# Patient Record
Sex: FEMALE | Race: WHITE | NOT HISPANIC OR LATINO | Employment: OTHER | URBAN - METROPOLITAN AREA
[De-identification: names, ages, dates, MRNs, and addresses within clinical notes are randomized per-mention and may not be internally consistent; named-entity substitution may affect disease eponyms.]

---

## 2017-06-19 ENCOUNTER — HOSPITAL ENCOUNTER (OUTPATIENT)
Dept: FAMILY MEDICINE CLINIC | Facility: CLINIC | Age: 72
Setting detail: SPECIMEN
Discharge: HOME | End: 2017-06-19
Attending: HOSPITALIST | Admitting: HOSPITALIST

## 2017-06-19 LAB
INR PPP: 2.8 (ref 2–3)
PROTHROMBIN TIME: 31.1 SEC (ref 19.4–28.5)

## 2017-08-18 ENCOUNTER — HOSPITAL ENCOUNTER (OUTPATIENT)
Dept: FAMILY MEDICINE CLINIC | Facility: CLINIC | Age: 72
Setting detail: SPECIMEN
Discharge: HOME | End: 2017-08-18
Attending: HOSPITALIST | Admitting: HOSPITALIST

## 2017-08-18 LAB
INR PPP: 4.2 (ref 2–3)
PROTHROMBIN TIME: 44.6 SEC (ref 19.4–28.5)

## 2017-09-19 ENCOUNTER — HOSPITAL ENCOUNTER (OUTPATIENT)
Dept: FAMILY MEDICINE CLINIC | Facility: CLINIC | Age: 72
Setting detail: SPECIMEN
Discharge: HOME | End: 2017-09-19
Attending: HOSPITALIST | Admitting: HOSPITALIST

## 2017-09-19 LAB
ALBUMIN SERPL-MCNC: 3.9 G/DL (ref 3.5–4.8)
ALBUMIN/GLOB SERPL: 1.1 {RATIO} (ref 1–1.7)
ALP SERPL-CCNC: 69 IU/L (ref 32–91)
ALT SERPL-CCNC: 15 IU/L (ref 14–54)
ANION GAP SERPL CALC-SCNC: 12 MMOL/L (ref 10–20)
AST SERPL-CCNC: 25 IU/L (ref 15–41)
BASOPHILS # BLD AUTO: 0.1 10*3/UL (ref 0–0.2)
BASOPHILS NFR BLD AUTO: 1 % (ref 0–2)
BILIRUB SERPL-MCNC: 0.6 MG/DL (ref 0.3–1.2)
BUN SERPL-MCNC: 16 MG/DL (ref 8–20)
BUN/CREAT SERPL: 14.5 (ref 5.4–26.2)
CALCIUM SERPL-MCNC: 9.5 MG/DL (ref 8.9–10.3)
CHLORIDE SERPL-SCNC: 107 MMOL/L (ref 101–111)
CHOLEST SERPL-MCNC: 220 MG/DL
CHOLEST/HDLC SERPL: 3.7 {RATIO}
CONV CO2: 23 MMOL/L (ref 22–32)
CONV LDL CHOLESTEROL DIRECT: 147 MG/DL (ref 0–100)
CONV TOTAL PROTEIN: 7.6 G/DL (ref 6.1–7.9)
CREAT UR-MCNC: 1.1 MG/DL (ref 0.4–1)
DIFFERENTIAL METHOD BLD: (no result)
EOSINOPHIL # BLD AUTO: 0.2 10*3/UL (ref 0–0.3)
EOSINOPHIL # BLD AUTO: 3 % (ref 0–3)
ERYTHROCYTE [DISTWIDTH] IN BLOOD BY AUTOMATED COUNT: 13.5 % (ref 11.5–14.5)
FOLATE SERPL-MCNC: 12 NG/ML (ref 5.9–24.8)
GLOBULIN UR ELPH-MCNC: 3.7 G/DL (ref 2.5–3.8)
GLUCOSE SERPL-MCNC: 94 MG/DL (ref 65–99)
HCT VFR BLD AUTO: 45.5 % (ref 35–49)
HDLC SERPL-MCNC: 60 MG/DL
HGB BLD-MCNC: 15.3 G/DL (ref 12–15)
INR PPP: 2.1 (ref 2–3)
LDLC/HDLC SERPL: 2.5 {RATIO}
LIPID INTERPRETATION: ABNORMAL
LYMPHOCYTES # BLD AUTO: 1.8 10*3/UL (ref 0.8–4.8)
LYMPHOCYTES NFR BLD AUTO: 29 % (ref 18–42)
MCH RBC QN AUTO: 31.4 PG (ref 26–32)
MCHC RBC AUTO-ENTMCNC: 33.6 G/DL (ref 32–36)
MCV RBC AUTO: 93.4 FL (ref 80–94)
MONOCYTES # BLD AUTO: 0.7 10*3/UL (ref 0.1–1.3)
MONOCYTES NFR BLD AUTO: 10 % (ref 2–11)
NEUTROPHILS # BLD AUTO: 3.7 10*3/UL (ref 2.3–8.6)
NEUTROPHILS NFR BLD AUTO: 57 % (ref 50–75)
NRBC BLD AUTO-RTO: 0 /100{WBCS}
NRBC/RBC NFR BLD MANUAL: 0 10*3/UL
PLATELET # BLD AUTO: 175 10*3/UL (ref 150–450)
PMV BLD AUTO: 11 FL (ref 7.4–10.4)
POTASSIUM SERPL-SCNC: 4 MMOL/L (ref 3.6–5.1)
PROTHROMBIN TIME: 21.9 SEC (ref 19.4–28.5)
RBC # BLD AUTO: 4.87 10*6/UL (ref 4–5.4)
SODIUM SERPL-SCNC: 138 MMOL/L (ref 136–144)
TRIGL SERPL-MCNC: 92 MG/DL
TSH SERPL-ACNC: 1.66 UIU/ML (ref 0.34–5.6)
VIT B12 SERPL-MCNC: 172 PG/ML (ref 180–914)
VLDLC SERPL CALC-MCNC: 13 MG/DL
WBC # BLD AUTO: 6.4 10*3/UL (ref 4.5–11.5)

## 2017-10-19 ENCOUNTER — HOSPITAL ENCOUNTER (OUTPATIENT)
Dept: FAMILY MEDICINE CLINIC | Facility: CLINIC | Age: 72
Setting detail: SPECIMEN
Discharge: HOME | End: 2017-10-19
Attending: HOSPITALIST | Admitting: HOSPITALIST

## 2017-10-19 LAB
INR PPP: 2.7 (ref 2–3)
PROTHROMBIN TIME: 27 SEC (ref 19.4–28.5)

## 2017-11-02 ENCOUNTER — CONVERSION ENCOUNTER (OUTPATIENT)
Dept: FAMILY MEDICINE CLINIC | Facility: CLINIC | Age: 72
End: 2017-11-02

## 2017-11-20 ENCOUNTER — HOSPITAL ENCOUNTER (OUTPATIENT)
Dept: FAMILY MEDICINE CLINIC | Facility: CLINIC | Age: 72
Setting detail: SPECIMEN
Discharge: HOME | End: 2017-11-20
Attending: HOSPITALIST | Admitting: HOSPITALIST

## 2017-11-20 LAB
INR PPP: 3.5 (ref 2–3)
PROTHROMBIN TIME: 35.7 SEC (ref 19.4–28.5)

## 2018-01-17 ENCOUNTER — HOSPITAL ENCOUNTER (OUTPATIENT)
Dept: FAMILY MEDICINE CLINIC | Facility: CLINIC | Age: 73
Setting detail: SPECIMEN
Discharge: HOME | End: 2018-01-17
Attending: HOSPITALIST | Admitting: HOSPITALIST

## 2018-01-17 LAB
INR PPP: 4.3 (ref 2–3)
PROTHROMBIN TIME: 44.2 SEC (ref 19.4–28.5)

## 2018-02-15 ENCOUNTER — HOSPITAL ENCOUNTER (OUTPATIENT)
Dept: FAMILY MEDICINE CLINIC | Facility: CLINIC | Age: 73
Setting detail: SPECIMEN
Discharge: HOME | End: 2018-02-15
Attending: HOSPITALIST | Admitting: HOSPITALIST

## 2018-02-15 LAB
INR PPP: 2.3 (ref 2–3)
PROTHROMBIN TIME: 23.7 SEC (ref 19.4–28.5)

## 2018-03-16 ENCOUNTER — HOSPITAL ENCOUNTER (OUTPATIENT)
Dept: FAMILY MEDICINE CLINIC | Facility: CLINIC | Age: 73
Setting detail: SPECIMEN
Discharge: HOME | End: 2018-03-16
Attending: HOSPITALIST | Admitting: HOSPITALIST

## 2018-03-16 LAB
INR PPP: 2.2 (ref 2–3)
PROTHROMBIN TIME: 22.6 SEC (ref 19.4–28.5)

## 2018-04-16 ENCOUNTER — HOSPITAL ENCOUNTER (OUTPATIENT)
Dept: FAMILY MEDICINE CLINIC | Facility: CLINIC | Age: 73
Setting detail: SPECIMEN
Discharge: HOME | End: 2018-04-16
Attending: HOSPITALIST | Admitting: HOSPITALIST

## 2018-04-16 LAB
INR PPP: 2.9 (ref 2–3)
PROTHROMBIN TIME: 29.2 SEC (ref 19.4–28.5)

## 2018-05-17 ENCOUNTER — HOSPITAL ENCOUNTER (OUTPATIENT)
Dept: FAMILY MEDICINE CLINIC | Facility: CLINIC | Age: 73
Setting detail: SPECIMEN
Discharge: HOME | End: 2018-05-17
Attending: HOSPITALIST | Admitting: HOSPITALIST

## 2018-05-17 LAB
INR PPP: 1.5 (ref 2–3)
PROTHROMBIN TIME: 15.5 SEC (ref 19.4–28.5)

## 2018-06-15 ENCOUNTER — HOSPITAL ENCOUNTER (OUTPATIENT)
Dept: FAMILY MEDICINE CLINIC | Facility: CLINIC | Age: 73
Setting detail: SPECIMEN
Discharge: HOME | End: 2018-06-15
Attending: HOSPITALIST | Admitting: HOSPITALIST

## 2018-06-15 LAB
INR PPP: 3.1 (ref 2–3)
PROTHROMBIN TIME: 31.2 SEC (ref 19.4–28.5)

## 2018-07-16 ENCOUNTER — HOSPITAL ENCOUNTER (OUTPATIENT)
Dept: FAMILY MEDICINE CLINIC | Facility: CLINIC | Age: 73
Setting detail: SPECIMEN
Discharge: HOME | End: 2018-07-16
Attending: HOSPITALIST | Admitting: HOSPITALIST

## 2018-07-16 LAB
INR PPP: 2.7 (ref 2–3)
PROTHROMBIN TIME: 27.8 SEC (ref 19.4–28.5)

## 2018-08-20 ENCOUNTER — HOSPITAL ENCOUNTER (OUTPATIENT)
Dept: FAMILY MEDICINE CLINIC | Facility: CLINIC | Age: 73
Setting detail: SPECIMEN
Discharge: HOME | End: 2018-08-20
Attending: HOSPITALIST | Admitting: HOSPITALIST

## 2018-08-20 LAB
INR PPP: 2.5 (ref 2–3)
PROTHROMBIN TIME: 25.6 SEC (ref 19.4–28.5)

## 2018-09-25 ENCOUNTER — HOSPITAL ENCOUNTER (OUTPATIENT)
Dept: FAMILY MEDICINE CLINIC | Facility: CLINIC | Age: 73
Setting detail: SPECIMEN
Discharge: HOME | End: 2018-09-25
Attending: HOSPITALIST | Admitting: HOSPITALIST

## 2018-09-25 LAB
ALBUMIN SERPL-MCNC: 3.8 G/DL (ref 3.5–4.8)
ALBUMIN/GLOB SERPL: 1.1 {RATIO} (ref 1–1.7)
ALP SERPL-CCNC: 73 IU/L (ref 32–91)
ALT SERPL-CCNC: 18 IU/L (ref 14–54)
ANION GAP SERPL CALC-SCNC: 13.2 MMOL/L (ref 10–20)
AST SERPL-CCNC: 31 IU/L (ref 15–41)
BASOPHILS # BLD AUTO: 0.1 10*3/UL (ref 0–0.2)
BASOPHILS NFR BLD AUTO: 1 % (ref 0–2)
BILIRUB SERPL-MCNC: 0.4 MG/DL (ref 0.3–1.2)
BUN SERPL-MCNC: 18 MG/DL (ref 8–20)
BUN/CREAT SERPL: 15 (ref 5.4–26.2)
CALCIUM SERPL-MCNC: 9.7 MG/DL (ref 8.9–10.3)
CHLORIDE SERPL-SCNC: 107 MMOL/L (ref 101–111)
CONV CO2: 24 MMOL/L (ref 22–32)
CONV TOTAL PROTEIN: 7.3 G/DL (ref 6.1–7.9)
CREAT UR-MCNC: 1.2 MG/DL (ref 0.4–1)
DIFFERENTIAL METHOD BLD: (no result)
EOSINOPHIL # BLD AUTO: 0.2 10*3/UL (ref 0–0.3)
EOSINOPHIL # BLD AUTO: 3 % (ref 0–3)
ERYTHROCYTE [DISTWIDTH] IN BLOOD BY AUTOMATED COUNT: 13.3 % (ref 11.5–14.5)
GLOBULIN UR ELPH-MCNC: 3.5 G/DL (ref 2.5–3.8)
GLUCOSE SERPL-MCNC: 102 MG/DL (ref 65–99)
HCT VFR BLD AUTO: 43.4 % (ref 35–49)
HGB BLD-MCNC: 15 G/DL (ref 12–15)
INR PPP: 2.7 (ref 2–3)
LYMPHOCYTES # BLD AUTO: 2.1 10*3/UL (ref 0.8–4.8)
LYMPHOCYTES NFR BLD AUTO: 34 % (ref 18–42)
MCH RBC QN AUTO: 32.3 PG (ref 26–32)
MCHC RBC AUTO-ENTMCNC: 34.6 G/DL (ref 32–36)
MCV RBC AUTO: 93.4 FL (ref 80–94)
MONOCYTES # BLD AUTO: 0.6 10*3/UL (ref 0.1–1.3)
MONOCYTES NFR BLD AUTO: 10 % (ref 2–11)
NEUTROPHILS # BLD AUTO: 3.2 10*3/UL (ref 2.3–8.6)
NEUTROPHILS NFR BLD AUTO: 52 % (ref 50–75)
NRBC BLD AUTO-RTO: 0 /100{WBCS}
NRBC/RBC NFR BLD MANUAL: 0 10*3/UL
PLATELET # BLD AUTO: 203 10*3/UL (ref 150–450)
PMV BLD AUTO: 10.4 FL (ref 7.4–10.4)
POTASSIUM SERPL-SCNC: 4.2 MMOL/L (ref 3.6–5.1)
PROTHROMBIN TIME: 28 SEC (ref 19.4–28.5)
RBC # BLD AUTO: 4.65 10*6/UL (ref 4–5.4)
SODIUM SERPL-SCNC: 140 MMOL/L (ref 136–144)
WBC # BLD AUTO: 6.2 10*3/UL (ref 4.5–11.5)

## 2018-10-25 ENCOUNTER — HOSPITAL ENCOUNTER (OUTPATIENT)
Dept: FAMILY MEDICINE CLINIC | Facility: CLINIC | Age: 73
Setting detail: SPECIMEN
Discharge: HOME | End: 2018-10-25
Attending: HOSPITALIST | Admitting: HOSPITALIST

## 2018-10-25 LAB
INR PPP: 2.2 (ref 2–3)
PROTHROMBIN TIME: 21.6 SEC (ref 19.4–28.5)

## 2018-11-26 ENCOUNTER — HOSPITAL ENCOUNTER (OUTPATIENT)
Dept: FAMILY MEDICINE CLINIC | Facility: CLINIC | Age: 73
Setting detail: SPECIMEN
Discharge: HOME | End: 2018-11-26
Attending: HOSPITALIST | Admitting: HOSPITALIST

## 2018-11-26 LAB
INR PPP: 2.4 (ref 2–3)
PROTHROMBIN TIME: 23.1 SEC (ref 19.4–28.5)

## 2018-12-27 ENCOUNTER — HOSPITAL ENCOUNTER (OUTPATIENT)
Dept: FAMILY MEDICINE CLINIC | Facility: CLINIC | Age: 73
Setting detail: SPECIMEN
Discharge: HOME | End: 2018-12-27
Attending: HOSPITALIST | Admitting: HOSPITALIST

## 2018-12-27 LAB
INR PPP: 2.7 (ref 2–3)
PROTHROMBIN TIME: 26 SEC (ref 19.4–28.5)

## 2019-01-28 ENCOUNTER — HOSPITAL ENCOUNTER (OUTPATIENT)
Dept: FAMILY MEDICINE CLINIC | Facility: CLINIC | Age: 74
Setting detail: SPECIMEN
Discharge: HOME | End: 2019-01-28
Attending: HOSPITALIST | Admitting: HOSPITALIST

## 2019-01-28 LAB
INR PPP: 3.4 (ref 2–3)
PROTHROMBIN TIME: 32.8 SEC (ref 19.4–28.5)

## 2019-02-27 ENCOUNTER — HOSPITAL ENCOUNTER (OUTPATIENT)
Dept: FAMILY MEDICINE CLINIC | Facility: CLINIC | Age: 74
Setting detail: SPECIMEN
Discharge: HOME | End: 2019-02-27
Attending: HOSPITALIST | Admitting: HOSPITALIST

## 2019-02-27 LAB
INR PPP: 3.1 (ref 2–3)
PROTHROMBIN TIME: 30.2 SEC (ref 19.4–28.5)

## 2019-04-03 ENCOUNTER — HOSPITAL ENCOUNTER (OUTPATIENT)
Dept: FAMILY MEDICINE CLINIC | Facility: CLINIC | Age: 74
Setting detail: SPECIMEN
Discharge: HOME | End: 2019-04-03
Attending: HOSPITALIST | Admitting: HOSPITALIST

## 2019-04-03 LAB
INR PPP: 2.8 (ref 2–3)
PROTHROMBIN TIME: 26.8 SEC (ref 19.4–28.5)

## 2019-05-03 ENCOUNTER — HOSPITAL ENCOUNTER (OUTPATIENT)
Dept: FAMILY MEDICINE CLINIC | Facility: CLINIC | Age: 74
Setting detail: SPECIMEN
Discharge: HOME | End: 2019-05-03
Attending: HOSPITALIST | Admitting: HOSPITALIST

## 2019-06-04 VITALS — BODY MASS INDEX: 47.68 KG/M2 | HEIGHT: 67 IN

## 2019-06-10 ENCOUNTER — HOSPITAL ENCOUNTER (OUTPATIENT)
Dept: FAMILY MEDICINE CLINIC | Facility: CLINIC | Age: 74
Setting detail: SPECIMEN
Discharge: HOME | End: 2019-06-10
Attending: HOSPITALIST | Admitting: HOSPITALIST

## 2019-06-10 LAB
INR PPP: 4 (ref 2–3)
PROTHROMBIN TIME: 39.1 SEC (ref 19.4–28.5)

## 2019-06-27 DIAGNOSIS — Z79.01 ENCOUNTER FOR CURRENT LONG-TERM USE OF ANTICOAGULANTS: Primary | ICD-10-CM

## 2019-06-28 ENCOUNTER — LAB (OUTPATIENT)
Dept: LAB | Facility: HOSPITAL | Age: 74
End: 2019-06-28

## 2019-06-28 DIAGNOSIS — Z79.01 ENCOUNTER FOR CURRENT LONG-TERM USE OF ANTICOAGULANTS: ICD-10-CM

## 2019-06-28 LAB
INR PPP: 0.92 (ref 0.9–1.1)
PROTHROMBIN TIME: 9.7 SECONDS (ref 9.6–11.7)

## 2019-06-28 PROCEDURE — 85610 PROTHROMBIN TIME: CPT

## 2019-06-28 RX ORDER — WARFARIN SODIUM 6 MG/1
TABLET ORAL
Qty: 90 TABLET | Refills: 0 | Status: SHIPPED | OUTPATIENT
Start: 2019-06-28 | End: 2019-09-10 | Stop reason: SDUPTHER

## 2019-07-11 DIAGNOSIS — Z51.81 ENCOUNTER FOR MONITORING COUMADIN THERAPY: Primary | ICD-10-CM

## 2019-07-11 DIAGNOSIS — Z79.01 ENCOUNTER FOR MONITORING COUMADIN THERAPY: Primary | ICD-10-CM

## 2019-07-15 ENCOUNTER — LAB (OUTPATIENT)
Dept: FAMILY MEDICINE CLINIC | Facility: CLINIC | Age: 74
End: 2019-07-15

## 2019-07-15 DIAGNOSIS — Z79.01 ENCOUNTER FOR MONITORING COUMADIN THERAPY: ICD-10-CM

## 2019-07-15 DIAGNOSIS — Z51.81 ENCOUNTER FOR MONITORING COUMADIN THERAPY: ICD-10-CM

## 2019-07-15 LAB
INR PPP: 1.72 (ref 2–3)
PROTHROMBIN TIME: 16.7 SECONDS (ref 19.4–28.5)

## 2019-07-15 PROCEDURE — 85610 PROTHROMBIN TIME: CPT | Performed by: HOSPITALIST

## 2019-07-18 ENCOUNTER — TELEPHONE (OUTPATIENT)
Dept: FAMILY MEDICINE CLINIC | Facility: CLINIC | Age: 74
End: 2019-07-18

## 2019-08-12 ENCOUNTER — LAB (OUTPATIENT)
Dept: FAMILY MEDICINE CLINIC | Facility: CLINIC | Age: 74
End: 2019-08-12

## 2019-08-12 ENCOUNTER — TELEPHONE (OUTPATIENT)
Dept: FAMILY MEDICINE CLINIC | Facility: CLINIC | Age: 74
End: 2019-08-12

## 2019-08-12 DIAGNOSIS — Z79.01 ENCOUNTER FOR MONITORING COUMADIN THERAPY: ICD-10-CM

## 2019-08-12 DIAGNOSIS — Z51.81 ENCOUNTER FOR MONITORING COUMADIN THERAPY: ICD-10-CM

## 2019-08-12 LAB
INR PPP: 2.58 (ref 2–3)
PROTHROMBIN TIME: 24.9 SECONDS (ref 19.4–28.5)

## 2019-08-12 PROCEDURE — 85610 PROTHROMBIN TIME: CPT | Performed by: HOSPITALIST

## 2019-09-03 ENCOUNTER — CLINICAL SUPPORT NO REQUIREMENTS (OUTPATIENT)
Dept: CARDIOLOGY | Facility: CLINIC | Age: 74
End: 2019-09-03

## 2019-09-03 DIAGNOSIS — Z95.0 PRESENCE OF CARDIAC PACEMAKER: ICD-10-CM

## 2019-09-03 DIAGNOSIS — I49.5 SICK SINUS SYNDROME (HCC): Primary | ICD-10-CM

## 2019-09-03 PROCEDURE — 93296 REM INTERROG EVL PM/IDS: CPT | Performed by: NURSE PRACTITIONER

## 2019-09-03 PROCEDURE — 93294 REM INTERROG EVL PM/LDLS PM: CPT | Performed by: NURSE PRACTITIONER

## 2019-09-08 RX ORDER — VERAPAMIL HYDROCHLORIDE 240 MG/1
TABLET, FILM COATED, EXTENDED RELEASE ORAL
Qty: 90 TABLET | Refills: 0 | Status: SHIPPED | OUTPATIENT
Start: 2019-09-08 | End: 2019-12-08 | Stop reason: SDUPTHER

## 2019-09-09 PROBLEM — I49.5 SICK SINUS SYNDROME: Status: ACTIVE | Noted: 2019-09-09

## 2019-09-09 PROBLEM — Z95.0 PRESENCE OF CARDIAC PACEMAKER: Status: ACTIVE | Noted: 2019-09-09

## 2019-09-09 NOTE — PROGRESS NOTES
REMOTE DEVICE INTERROGATION    Remote device interrogation is reviewed.     Device Company: [employer]    Battery Stable.  Time to CORKY for years    Presenting EGM: Atrial fibrillation    Arrhythmia Logbook Reviewed: PAF noted patient is on warfarin    Device function appears Stable    Continue remote device interrogations every 3 months.

## 2019-09-10 RX ORDER — WARFARIN SODIUM 7.5 MG/1
TABLET ORAL
Qty: 30 TABLET | Refills: 0 | Status: SHIPPED | OUTPATIENT
Start: 2019-09-10 | End: 2021-04-09 | Stop reason: ALTCHOICE

## 2019-09-10 RX ORDER — WARFARIN SODIUM 6 MG/1
TABLET ORAL
Qty: 90 TABLET | Refills: 0 | Status: SHIPPED | OUTPATIENT
Start: 2019-09-10 | End: 2019-11-30 | Stop reason: SDUPTHER

## 2019-09-12 ENCOUNTER — TELEPHONE (OUTPATIENT)
Dept: FAMILY MEDICINE CLINIC | Facility: CLINIC | Age: 74
End: 2019-09-12

## 2019-09-12 ENCOUNTER — LAB (OUTPATIENT)
Dept: FAMILY MEDICINE CLINIC | Facility: CLINIC | Age: 74
End: 2019-09-12

## 2019-09-12 ENCOUNTER — OFFICE VISIT (OUTPATIENT)
Dept: FAMILY MEDICINE CLINIC | Facility: CLINIC | Age: 74
End: 2019-09-12

## 2019-09-12 VITALS
TEMPERATURE: 97.8 F | SYSTOLIC BLOOD PRESSURE: 120 MMHG | RESPIRATION RATE: 8 BRPM | HEART RATE: 88 BPM | DIASTOLIC BLOOD PRESSURE: 80 MMHG | BODY MASS INDEX: 43.54 KG/M2 | WEIGHT: 278 LBS

## 2019-09-12 DIAGNOSIS — E78.2 MIXED HYPERLIPIDEMIA: ICD-10-CM

## 2019-09-12 DIAGNOSIS — I10 ESSENTIAL HYPERTENSION: ICD-10-CM

## 2019-09-12 DIAGNOSIS — E55.9 VITAMIN D DEFICIENCY: ICD-10-CM

## 2019-09-12 DIAGNOSIS — E11.8 TYPE 2 DIABETES MELLITUS WITH COMPLICATION, WITHOUT LONG-TERM CURRENT USE OF INSULIN (HCC): ICD-10-CM

## 2019-09-12 DIAGNOSIS — Z12.31 SCREENING MAMMOGRAM, ENCOUNTER FOR: ICD-10-CM

## 2019-09-12 DIAGNOSIS — I48.0 PAROXYSMAL ATRIAL FIBRILLATION (HCC): ICD-10-CM

## 2019-09-12 DIAGNOSIS — Z79.01 WARFARIN THERAPY STARTED: ICD-10-CM

## 2019-09-12 DIAGNOSIS — Z95.0 PRESENCE OF CARDIAC PACEMAKER: ICD-10-CM

## 2019-09-12 DIAGNOSIS — E53.8 VITAMIN B12 DEFICIENCY: ICD-10-CM

## 2019-09-12 DIAGNOSIS — Z00.00 MEDICARE ANNUAL WELLNESS VISIT, SUBSEQUENT: ICD-10-CM

## 2019-09-12 DIAGNOSIS — Z00.00 MEDICARE ANNUAL WELLNESS VISIT, SUBSEQUENT: Primary | ICD-10-CM

## 2019-09-12 PROBLEM — E78.5 HYPERLIPIDEMIA: Status: ACTIVE | Noted: 2019-09-12

## 2019-09-12 LAB
25(OH)D3 SERPL-MCNC: 39.5 NG/ML (ref 30–100)
ALBUMIN SERPL-MCNC: 3.7 G/DL (ref 3.5–4.8)
ALBUMIN/GLOB SERPL: 1.1 G/DL (ref 1–1.7)
ALP SERPL-CCNC: 74 U/L (ref 32–91)
ALT SERPL W P-5'-P-CCNC: 11 U/L (ref 14–54)
ANION GAP SERPL CALCULATED.3IONS-SCNC: 15.2 MMOL/L (ref 5–15)
ARTICHOKE IGE QN: 159 MG/DL (ref 0–100)
AST SERPL-CCNC: 23 U/L (ref 15–41)
BASOPHILS # BLD AUTO: 0.1 10*3/MM3 (ref 0–0.2)
BASOPHILS NFR BLD AUTO: 1.2 % (ref 0–1.5)
BILIRUB SERPL-MCNC: 0.7 MG/DL (ref 0.3–1.2)
BUN BLD-MCNC: 24 MG/DL (ref 8–20)
BUN/CREAT SERPL: 16 (ref 5.4–26.2)
CALCIUM SPEC-SCNC: 9.3 MG/DL (ref 8.9–10.3)
CHLORIDE SERPL-SCNC: 107 MMOL/L (ref 101–111)
CHOLEST SERPL-MCNC: 211 MG/DL
CO2 SERPL-SCNC: 23 MMOL/L (ref 22–32)
CREAT BLD-MCNC: 1.5 MG/DL (ref 0.4–1)
DEPRECATED RDW RBC AUTO: 44.6 FL (ref 37–54)
EOSINOPHIL # BLD AUTO: 0.2 10*3/MM3 (ref 0–0.4)
EOSINOPHIL NFR BLD AUTO: 3.4 % (ref 0.3–6.2)
ERYTHROCYTE [DISTWIDTH] IN BLOOD BY AUTOMATED COUNT: 13.2 % (ref 12.3–15.4)
FOLATE SERPL-MCNC: >24.1 NG/ML (ref 5.9–24.8)
GFR SERPL CREATININE-BSD FRML MDRD: 34 ML/MIN/1.73
GLOBULIN UR ELPH-MCNC: 3.3 GM/DL (ref 2.5–3.8)
GLUCOSE BLD-MCNC: 102 MG/DL (ref 65–99)
HBA1C MFR BLD: 5.1 % (ref 3.5–5.6)
HCT VFR BLD AUTO: 43.3 % (ref 34–46.6)
HDLC SERPL QL: 4.14
HDLC SERPL-MCNC: 51 MG/DL
HGB BLD-MCNC: 14.5 G/DL (ref 12–15.9)
INR PPP: 2.63 (ref 0.9–1.1)
LDLC/HDLC SERPL: 2.85 {RATIO}
LYMPHOCYTES # BLD AUTO: 1.6 10*3/MM3 (ref 0.7–3.1)
LYMPHOCYTES NFR BLD AUTO: 32.9 % (ref 19.6–45.3)
MCH RBC QN AUTO: 32.4 PG (ref 26.6–33)
MCHC RBC AUTO-ENTMCNC: 33.5 G/DL (ref 31.5–35.7)
MCV RBC AUTO: 96.9 FL (ref 79–97)
MONOCYTES # BLD AUTO: 0.5 10*3/MM3 (ref 0.1–0.9)
MONOCYTES NFR BLD AUTO: 9.5 % (ref 5–12)
NEUTROPHILS # BLD AUTO: 2.6 10*3/MM3 (ref 1.7–7)
NEUTROPHILS NFR BLD AUTO: 53 % (ref 42.7–76)
NRBC BLD AUTO-RTO: 0.1 /100 WBC (ref 0–0.2)
PLATELET # BLD AUTO: 192 10*3/MM3 (ref 140–450)
PMV BLD AUTO: 10.8 FL (ref 6–12)
POTASSIUM BLD-SCNC: 4.2 MMOL/L (ref 3.6–5.1)
PROT SERPL-MCNC: 7 G/DL (ref 6.1–7.9)
PROTHROMBIN TIME: 25.3 SECONDS (ref 9.6–11.7)
RBC # BLD AUTO: 4.47 10*6/MM3 (ref 3.77–5.28)
SODIUM BLD-SCNC: 141 MMOL/L (ref 136–144)
T4 FREE SERPL-MCNC: 1.04 NG/DL (ref 0.58–1.64)
TRIGL SERPL-MCNC: 72 MG/DL
TSH SERPL DL<=0.05 MIU/L-ACNC: 1.25 UIU/ML (ref 0.34–5.6)
VIT B12 BLD-MCNC: 427 PG/ML (ref 180–914)
VLDLC SERPL-MCNC: 14.4 MG/DL
WBC NRBC COR # BLD: 4.9 10*3/MM3 (ref 3.4–10.8)

## 2019-09-12 PROCEDURE — 83036 HEMOGLOBIN GLYCOSYLATED A1C: CPT | Performed by: HOSPITALIST

## 2019-09-12 PROCEDURE — 80053 COMPREHEN METABOLIC PANEL: CPT | Performed by: HOSPITALIST

## 2019-09-12 PROCEDURE — 80061 LIPID PANEL: CPT | Performed by: HOSPITALIST

## 2019-09-12 PROCEDURE — 85025 COMPLETE CBC W/AUTO DIFF WBC: CPT | Performed by: HOSPITALIST

## 2019-09-12 PROCEDURE — 82607 VITAMIN B-12: CPT | Performed by: HOSPITALIST

## 2019-09-12 PROCEDURE — 36415 COLL VENOUS BLD VENIPUNCTURE: CPT | Performed by: HOSPITALIST

## 2019-09-12 PROCEDURE — G0439 PPPS, SUBSEQ VISIT: HCPCS | Performed by: HOSPITALIST

## 2019-09-12 PROCEDURE — 84439 ASSAY OF FREE THYROXINE: CPT | Performed by: HOSPITALIST

## 2019-09-12 PROCEDURE — 84443 ASSAY THYROID STIM HORMONE: CPT | Performed by: HOSPITALIST

## 2019-09-12 PROCEDURE — 82306 VITAMIN D 25 HYDROXY: CPT | Performed by: HOSPITALIST

## 2019-09-12 PROCEDURE — 85610 PROTHROMBIN TIME: CPT | Performed by: HOSPITALIST

## 2019-09-12 PROCEDURE — 82746 ASSAY OF FOLIC ACID SERUM: CPT | Performed by: HOSPITALIST

## 2019-09-12 RX ORDER — LISINOPRIL AND HYDROCHLOROTHIAZIDE 20; 12.5 MG/1; MG/1
1 TABLET ORAL DAILY
Refills: 3 | COMMUNITY
Start: 2019-07-30 | End: 2020-03-09

## 2019-09-12 RX ORDER — METOPROLOL SUCCINATE 25 MG/1
25 TABLET, EXTENDED RELEASE ORAL 2 TIMES DAILY
Refills: 2 | COMMUNITY
Start: 2019-07-25 | End: 2019-10-10 | Stop reason: SDUPTHER

## 2019-09-12 NOTE — PROGRESS NOTES
Subsequent Medicare Wellness Visit   The ABC's of the Annual Wellness Visit    Chief Complaint   Patient presents with   • Medicare Wellness-subsequent       HPI:  Arin Singh, -1945, is a 74 y.o. female who presents for a Subsequent Medicare Wellness Visit.    Here for wellness and physical.  Overall doing well    Patient states other than the above problems, they are doing ok overall and has no other significant complaints, They report their colonoscopy is UTD, They report their mammogram is UTD and We discussed the recommended health maintenence items they should be keeping up on    Health Habits and Functional and Cognitive Screening and Depression Screening:  Functional & Cognitive Status 2019   Do you have difficulty preparing food and eating? No   Do you have difficulty bathing yourself, getting dressed or grooming yourself? No   Do you have difficulty using the toilet? No   Do you have difficulty moving around from place to place? No   Do you have trouble with steps or getting out of a bed or a chair? No   Current Diet Limited Junk Food   Dental Exam Up to date   Eye Exam Up to date   Exercise (times per week) 3 times per week   Current Exercise Activities Include Cardiovasular Workout on Exercise Equipment   Do you need help using the phone?  No   Are you deaf or do you have serious difficulty hearing?  No   Do you need help with transportation? No   Do you need help shopping? No   Do you need help preparing meals?  No   Do you need help with housework?  No   Do you need help with laundry? No   Do you need help taking your medications? No   Do you need help managing money? No   Do you ever drive or ride in a car without wearing a seat belt? No       Compared to one year ago, the patient feels her physical health and mental health  is the same     Depression Screen:  Patient denies the depression symptoms of depressed mood, feelings of hopelessness, insomnia, hypersomnia, difficulty  concentrating, suicidal thoughts and significant weight change      Past Medical/Family/Social History:  The following portions of the patient's history were reviewed and updated as appropriate and these included the meds, PMHx, family and social histories    No Known Allergies      Current Outpatient Medications:   •  lisinopril-hydrochlorothiazide (PRINZIDE,ZESTORETIC) 20-12.5 MG per tablet, Take 1 tablet by mouth Daily., Disp: , Rfl: 3  •  metoprolol succinate XL (TOPROL-XL) 25 MG 24 hr tablet, Take 25 mg by mouth 2 (Two) Times a Day., Disp: , Rfl: 2  •  verapamil SR (CALAN-SR) 240 MG CR tablet, TAKE 1 TABLET BY MOUTH DAILY, Disp: 90 tablet, Rfl: 0  •  warfarin (COUMADIN) 6 MG tablet, TAKE 1 TABLET BY MOUTH DAILY AS DIRECTED, Disp: 90 tablet, Rfl: 0  •  warfarin (COUMADIN) 7.5 MG tablet, TAKE 1 TABLET BY MOUTH DAILY OR AS DIRECTED, Disp: 30 tablet, Rfl: 0    History reviewed. No pertinent family history.    Social History     Tobacco Use   • Smoking status: Never Smoker   • Smokeless tobacco: Never Used   Substance Use Topics   • Alcohol use: No     Frequency: Never       History reviewed. No pertinent surgical history.    Patient Active Problem List   Diagnosis   • Sick sinus syndrome (CMS/HCC)   • Presence of cardiac pacemaker   • Absolute anemia   • Coagulation factor deficiency syndrome (CMS/HCC)   • Diabetes mellitus (CMS/HCC)   • Hyperlipidemia   • Hypertension   • Osteoarthritis of knee   • Other pulmonary embolism without acute cor pulmonale (CMS/HCC)   • Paroxysmal atrial fibrillation (CMS/HCC)   • Vitamin B12 deficiency   • Vitamin D deficiency   • Warfarin therapy started       Review of Systems   Constitutional: Negative for chills and fever.   Respiratory: Negative for chest tightness and shortness of breath.    Cardiovascular: Negative for chest pain.   Gastrointestinal: Negative for abdominal pain.   Musculoskeletal: Negative for arthralgias and myalgias.   Neurological: Negative for headache.        Objective     Vitals:    09/12/19 0832   BP: 120/80   BP Location: Left arm   Patient Position: Sitting   Cuff Size: Large Adult   Pulse: 88   Resp: 8   Temp: 97.8 °F (36.6 °C)   TempSrc: Oral   Weight: 126 kg (278 lb)       Wt Readings from Last 4 Encounters:   09/12/19 126 kg (278 lb)   04/09/19 127 kg (280 lb 16 oz)   01/31/19 (!) 137 kg (301 lb 16 oz)   09/25/18 134 kg (295 lb 16 oz)       Body mass index is 43.54 kg/m².    Patient's Body mass index is 43.54 kg/m². BMI is above normal parameters. Recommendations include: nutrition counseling.      No exam data present    The patient has no evidence of cognitve impairment.     Physical Exam   Constitutional: She is oriented to person, place, and time. She appears well-developed and well-nourished.   HENT:   Head: Normocephalic and atraumatic.   Right Ear: External ear normal.   Left Ear: External ear normal.   Nose: Nose normal.   Mouth/Throat: Oropharynx is clear and moist.   Eyes: EOM are normal. Pupils are equal, round, and reactive to light.   Neck: Neck supple. No thyromegaly present.   Cardiovascular: Normal rate, regular rhythm and normal heart sounds.   No murmur heard.  Pulmonary/Chest: Effort normal and breath sounds normal. No respiratory distress. She has no wheezes. She has no rales.   Abdominal: Soft. Bowel sounds are normal. There is no tenderness. There is no rebound and no guarding.   Obese   Musculoskeletal: Normal range of motion.   degen changes   Neurological: She is alert and oriented to person, place, and time.   Skin: Skin is warm and dry. No rash noted. No erythema.   Psychiatric: She has a normal mood and affect. Her behavior is normal.   Nursing note and vitals reviewed.      Recent Lab Results:     Lab Results   Component Value Date    CHOL 220 (H) 09/19/2017    TRIG 92 09/19/2017    HDL 60 09/19/2017    LDLHDL 2.5 09/19/2017       Assessment/Plan   Age-appropriate Screening Schedule:  Refer to the list below for future screening  recommendations based on patient's age, sex and/or medical conditions.      Health Maintenance   Topic Date Due   • URINE MICROALBUMIN  1945   • TDAP/TD VACCINES (1 - Tdap) 07/28/1962   • ZOSTER VACCINE (1 of 2) 07/28/1993   • PNEUMOCOCCAL VACCINES (65+ LOW/MEDIUM RISK) (2 of 2 - PPSV23) 10/12/2016   • HEMOGLOBIN A1C  06/27/2019   • INFLUENZA VACCINE  08/01/2019   • LIPID PANEL  09/12/2019       Medicare Risks and Personalized Health Plan:   We went over their medications, current problems  and all the recommended health maintenence items for their age group      CMS-Preventive Services Quick Reference  Medicare Preventive Services were  Addressed that were applicable:      Advance Care Planning:  End of life care and wishes were discussed    Diagnoses and all orders for this visit:    1. Medicare annual wellness visit, subsequent (Primary)  -     CBC & Differential  -     Comprehensive Metabolic Panel  -     Hemoglobin A1c  -     Lipid Panel  -     T4, Free  -     TSH  -     Vitamin B12 & Folate  -     Vitamin D 25 Hydroxy  -     Protime-INR; Future    2. Presence of cardiac pacemaker  -     CBC & Differential  -     Comprehensive Metabolic Panel  -     Hemoglobin A1c  -     Lipid Panel  -     T4, Free  -     TSH  -     Vitamin B12 & Folate  -     Vitamin D 25 Hydroxy  -     Protime-INR; Future    3. Mixed hyperlipidemia  -     CBC & Differential  -     Comprehensive Metabolic Panel  -     Hemoglobin A1c  -     Lipid Panel  -     T4, Free  -     TSH  -     Vitamin B12 & Folate  -     Vitamin D 25 Hydroxy  -     Protime-INR; Future    4. Essential hypertension  -     CBC & Differential  -     Comprehensive Metabolic Panel  -     Hemoglobin A1c  -     Lipid Panel  -     T4, Free  -     TSH  -     Vitamin B12 & Folate  -     Vitamin D 25 Hydroxy  -     Protime-INR; Future    5. Paroxysmal atrial fibrillation (CMS/HCC)  -     CBC & Differential  -     Comprehensive Metabolic Panel  -     Hemoglobin A1c  -      Lipid Panel  -     T4, Free  -     TSH  -     Vitamin B12 & Folate  -     Vitamin D 25 Hydroxy  -     Protime-INR; Future    6. Vitamin B12 deficiency  -     CBC & Differential  -     Comprehensive Metabolic Panel  -     Hemoglobin A1c  -     Lipid Panel  -     T4, Free  -     TSH  -     Vitamin B12 & Folate  -     Vitamin D 25 Hydroxy  -     Protime-INR; Future    7. Vitamin D deficiency  -     CBC & Differential  -     Comprehensive Metabolic Panel  -     Hemoglobin A1c  -     Lipid Panel  -     T4, Free  -     TSH  -     Vitamin B12 & Folate  -     Vitamin D 25 Hydroxy  -     Protime-INR; Future    8. Type 2 diabetes mellitus with complication, without long-term current use of insulin (CMS/[facility])  -     CBC & Differential  -     Comprehensive Metabolic Panel  -     Hemoglobin A1c  -     Lipid Panel  -     T4, Free  -     TSH  -     Vitamin B12 & Folate  -     Vitamin D 25 Hydroxy  -     Protime-INR; Future    9. Warfarin therapy started  -     CBC & Differential  -     Comprehensive Metabolic Panel  -     Hemoglobin A1c  -     Lipid Panel  -     T4, Free  -     TSH  -     Vitamin B12 & Folate  -     Vitamin D 25 Hydroxy  -     Protime-INR; Future    10. Screening mammogram, encounter for  -     Mammo Screening Digital Tomosynthesis Bilateral With CAD; Future        This patient overall looks good related to their annual checkup.  Problem areas were addressed and they were encouraged to continue good lifestyle habits and behaviors. During this visit for their annual exam, we reviewed their personal history, social history and family history.  We went over their medications and all the recommended health maintenence items for their age group. They were given the opportunity to ask questions and discuss other concerns.      An After Visit Summary and PPPS with all of these plans were given to the patient.      Follow Up:  Return in about 3 months (around 12/12/2019), or if symptoms worsen or fail to improve.

## 2019-10-07 ENCOUNTER — CLINICAL SUPPORT NO REQUIREMENTS (OUTPATIENT)
Dept: CARDIOLOGY | Facility: CLINIC | Age: 74
End: 2019-10-07

## 2019-10-07 ENCOUNTER — OFFICE VISIT (OUTPATIENT)
Dept: CARDIOLOGY | Facility: CLINIC | Age: 74
End: 2019-10-07

## 2019-10-07 VITALS
HEIGHT: 67 IN | SYSTOLIC BLOOD PRESSURE: 126 MMHG | HEART RATE: 76 BPM | OXYGEN SATURATION: 97 % | WEIGHT: 276.6 LBS | DIASTOLIC BLOOD PRESSURE: 72 MMHG | BODY MASS INDEX: 43.41 KG/M2

## 2019-10-07 DIAGNOSIS — Z95.0 PRESENCE OF CARDIAC PACEMAKER: ICD-10-CM

## 2019-10-07 DIAGNOSIS — E78.2 HYPERLIPIDEMIA, MIXED: ICD-10-CM

## 2019-10-07 DIAGNOSIS — I10 ESSENTIAL HYPERTENSION: ICD-10-CM

## 2019-10-07 DIAGNOSIS — I48.0 PAROXYSMAL ATRIAL FIBRILLATION (HCC): Primary | ICD-10-CM

## 2019-10-07 PROBLEM — I49.5 SICK SINUS SYNDROME: Status: RESOLVED | Noted: 2019-09-09 | Resolved: 2019-10-07

## 2019-10-07 PROCEDURE — 93000 ELECTROCARDIOGRAM COMPLETE: CPT | Performed by: INTERNAL MEDICINE

## 2019-10-07 PROCEDURE — 99213 OFFICE O/P EST LOW 20 MIN: CPT | Performed by: INTERNAL MEDICINE

## 2019-10-07 PROCEDURE — 93279 PRGRMG DEV EVAL PM/LDLS PM: CPT | Performed by: INTERNAL MEDICINE

## 2019-10-07 NOTE — PROGRESS NOTES
Cardiology Office Visit Note      Referring physician:  Jadyn    Reason For Followup: 6 month with device check    HPI:  Arin Singh is a 74 y.o. female FU  sick sinus syndrome requiring permanent AV sequential pacemaker implant in June 2016.   Also  known to have hypertensive heart disease, dyslipidemia, DJD requiring   right TKR, and premorbid exogenous obesity.  In addition, she has MTHFR, which may be related to her previous pulmonary embolism andsubsequent pulmonary hypertension.  Arin is not known to have other significant valvular heart disease or cardiomyopathy.     Presently living in assisted living facility, but remains socially active and enjoys going on trips with family and friends.    Device interrogation today: Satisfactory device functioning with approximately 16% SVT burden.  She did raise concern for possible sleep apnea and its relatedness to cardiac dysrhythmia.    A/C therapy managed per PCP.      She returns today with no specific complaints.  She denies chest pain, dyspnea, PND, orthopnea, palpitations, near syncope,   lower extremity edema or feelings of her heart racing.  She has been compliant with her medications.         Past Medical History:   Diagnosis Date   • Coagulation factor deficiency syndrome (CMS/HCC) 6/4/2013   • Diabetes mellitus (CMS/HCC) 7/2/2013   • Hyperlipidemia 9/12/2019   • Hypertension 9/12/2019   • Osteoarthritis of knee 10/7/2015   • Other pulmonary embolism without acute cor pulmonale (CMS/HCC) 3/8/2013   • Paroxysmal atrial fibrillation (CMS/HCC) 10/4/2017   • Presence of cardiac pacemaker 9/9/2019   • Sick sinus syndrome (CMS/HCC) 9/9/2019   • Vitamin B12 deficiency 11/2/2017   • Vitamin D deficiency 9/25/2018       Past Surgical History:   Procedure Laterality Date   • INSERT / REPLACE / REMOVE PACEMAKER  2016    BS AV Sequential         Current Outpatient Medications:   •  lisinopril-hydrochlorothiazide (PRINZIDE,ZESTORETIC) 20-12.5 MG per tablet,  "Take 1 tablet by mouth Daily., Disp: , Rfl: 3  •  metoprolol succinate XL (TOPROL-XL) 25 MG 24 hr tablet, Take 25 mg by mouth 2 (Two) Times a Day., Disp: , Rfl: 2  •  verapamil SR (CALAN-SR) 240 MG CR tablet, TAKE 1 TABLET BY MOUTH DAILY, Disp: 90 tablet, Rfl: 0  •  warfarin (COUMADIN) 6 MG tablet, TAKE 1 TABLET BY MOUTH DAILY AS DIRECTED, Disp: 90 tablet, Rfl: 0  •  warfarin (COUMADIN) 7.5 MG tablet, TAKE 1 TABLET BY MOUTH DAILY OR AS DIRECTED, Disp: 30 tablet, Rfl: 0    Social History     Socioeconomic History   • Marital status:      Spouse name: Not on file   • Number of children: Not on file   • Years of education: Not on file   • Highest education level: Not on file   Tobacco Use   • Smoking status: Never Smoker   • Smokeless tobacco: Never Used   Substance and Sexual Activity   • Alcohol use: No     Frequency: Never   • Drug use: No       No family history on file.      Review of Systems   General: denies fever, chills, anorexia, weight loss  Eyes: denies blurring, diplopia  Ear/Nose/Throat: denies ear pain, nosebleeds, hoarseness  Cardiovascular: See HPI  Respiratory: denies excessive sputum, hemoptysis, wheezing.  She does admit to having been told that she has very loud snoring patterns and possible apnea during sleep.  Gastrointestinal: denies nausea, vomiting, change in bowel habits, abdominal pain  Genitourinary: denies dysuria and hematuria  Musculoskeletal: Mild degenerative joint disease complaints with arthralgia limiting functional capacity involving weightbearing joints  Skin: denies rashes, itching, suspicious lesions  Neurologic: denies focal neuro deficits  Psychiatric: denies depression, anxiety  Endocrine: denies cold intolerance, heat intolerance  Hematologic/Lymphatic: denies abnormal bruising, bleeding  Allergic/Immunologic: denies urticaria or persistent infections      Objective     Visit Vitals  /72   Pulse 76   Ht 170.2 cm (67\")   Wt 125 kg (276 lb 9.6 oz)   SpO2 97% " Comment: room air   BMI 43.32 kg/m²           Physical Exam  General:     Obese, well developed,, in no acute distress.    Head:     normocephalic and atraumatic.    Eyes:    PERRL/EOM intact, conjunctiva and sclera clear with out nystagmus.    Neck:    no jvd or bruits  Chest Wall:    no deformities   Lungs:    clear bilaterally to auscultation with adequate global airflow   Heart:    non-displaced PMI; regular rate and rhythm, normal S1, S2 without murmurs, rubs, or gallops  Abdomen:  Soft, nontender without HSM  Msk:    no deformity; adequate R OM despite DJD changes mostly involving hips and knees  Pulses:    pulses normal in all 4 extremities.    Extremities:    no clubbing, cyanosis, edema   Neurologic:    no focal sensory or motor deficits  Skin:    intact without lesions or rashes.    Psych:    alert and cooperative; normal mood and affect; normal attention span and concentration.            ECG 12 Lead  Date/Time: 10/7/2019 1:26 PM  Performed by: MARIEL Guillen MD  Authorized by: MARIEL Guillen MD   Comparison: compared with previous ECG from 4/9/2019  Similar to previous ECG  Comments: Predominantly atrial paced rhythm with 100% V sensing; one isolated PVC.  No significant change from previous tracing              Assessment:   1. Paroxysmal atrial fibrillation (CMS/HCC)  - Currently well controlled on metoprolol succinate and long-acting verapamil with AV sequential pacemaker  --Remains fully anticoagulated on warfarin; followed by PCP    2. Presence of cardiac pacemaker  -Satisfactory with AV sequential pacemaker implanted June 2016; interrogated today demonstrating satisfactory device functioning with 16% AT/AF burden and satisfactory battery status    3. Hyperlipidemia, mixed  -Not on statin therapy; managed per PCP    4. Essential hypertension  -Remains well regulated on combination of verapamil and metoprolol        Plan:  -Continue current medications as listed in review detail today.  -  Maintained on full anticoagulant therapy; followed by PCP  - Return to pacemaker clinic 6 months for device interrogation to reassess AF burden; return to see me for follow-up in 1 year or sooner if needed    MARIEL Guillen MD  10/7/2019 6:47 PM

## 2019-10-10 RX ORDER — METOPROLOL SUCCINATE 25 MG/1
TABLET, EXTENDED RELEASE ORAL
Qty: 180 TABLET | Refills: 0 | Status: SHIPPED | OUTPATIENT
Start: 2019-10-10 | End: 2019-12-23

## 2019-10-17 DIAGNOSIS — Z79.01 ENCOUNTER FOR MONITORING COUMADIN THERAPY: Primary | ICD-10-CM

## 2019-10-17 DIAGNOSIS — Z51.81 ENCOUNTER FOR MONITORING COUMADIN THERAPY: Primary | ICD-10-CM

## 2019-10-18 ENCOUNTER — CLINICAL SUPPORT (OUTPATIENT)
Dept: FAMILY MEDICINE CLINIC | Facility: CLINIC | Age: 74
End: 2019-10-18

## 2019-10-18 ENCOUNTER — LAB (OUTPATIENT)
Dept: FAMILY MEDICINE CLINIC | Facility: CLINIC | Age: 74
End: 2019-10-18

## 2019-10-18 DIAGNOSIS — Z23 IMMUNIZATION DUE: Primary | ICD-10-CM

## 2019-10-18 DIAGNOSIS — Z79.01 ENCOUNTER FOR MONITORING COUMADIN THERAPY: ICD-10-CM

## 2019-10-18 DIAGNOSIS — Z51.81 ENCOUNTER FOR MONITORING COUMADIN THERAPY: ICD-10-CM

## 2019-10-18 LAB
INR PPP: 3.3 (ref 2–3)
PROTHROMBIN TIME: 31 SECONDS (ref 19.4–28.5)

## 2019-10-18 PROCEDURE — 36415 COLL VENOUS BLD VENIPUNCTURE: CPT

## 2019-10-18 PROCEDURE — 90674 CCIIV4 VAC NO PRSV 0.5 ML IM: CPT | Performed by: HOSPITALIST

## 2019-10-18 PROCEDURE — G0008 ADMIN INFLUENZA VIRUS VAC: HCPCS | Performed by: HOSPITALIST

## 2019-10-18 PROCEDURE — 85610 PROTHROMBIN TIME: CPT | Performed by: HOSPITALIST

## 2019-10-21 ENCOUNTER — TELEPHONE (OUTPATIENT)
Dept: FAMILY MEDICINE CLINIC | Facility: CLINIC | Age: 74
End: 2019-10-21

## 2019-10-22 DIAGNOSIS — I48.0 PAROXYSMAL ATRIAL FIBRILLATION (HCC): Primary | ICD-10-CM

## 2019-11-21 DIAGNOSIS — Z51.81 ENCOUNTER FOR MONITORING COUMADIN THERAPY: Primary | ICD-10-CM

## 2019-11-21 DIAGNOSIS — Z79.01 ENCOUNTER FOR MONITORING COUMADIN THERAPY: Primary | ICD-10-CM

## 2019-11-22 ENCOUNTER — TELEPHONE (OUTPATIENT)
Dept: FAMILY MEDICINE CLINIC | Facility: CLINIC | Age: 74
End: 2019-11-22

## 2019-11-22 ENCOUNTER — LAB (OUTPATIENT)
Dept: FAMILY MEDICINE CLINIC | Facility: CLINIC | Age: 74
End: 2019-11-22

## 2019-11-22 DIAGNOSIS — Z51.81 ENCOUNTER FOR MONITORING COUMADIN THERAPY: ICD-10-CM

## 2019-11-22 DIAGNOSIS — Z79.01 ENCOUNTER FOR MONITORING COUMADIN THERAPY: ICD-10-CM

## 2019-11-22 LAB
INR PPP: 3.41 (ref 2–3)
PROTHROMBIN TIME: 32 SECONDS (ref 19.4–28.5)

## 2019-11-22 PROCEDURE — 36415 COLL VENOUS BLD VENIPUNCTURE: CPT

## 2019-11-22 PROCEDURE — 85610 PROTHROMBIN TIME: CPT | Performed by: HOSPITALIST

## 2019-11-22 NOTE — TELEPHONE ENCOUNTER
----- Message from Delta Salamanca MD sent at 11/22/2019 12:31 PM EST -----  INR result looks fine.  Continue same dose

## 2019-11-27 DIAGNOSIS — Z12.31 SCREENING MAMMOGRAM, ENCOUNTER FOR: ICD-10-CM

## 2019-12-01 RX ORDER — WARFARIN SODIUM 6 MG/1
TABLET ORAL
Qty: 90 TABLET | Refills: 0 | Status: SHIPPED | OUTPATIENT
Start: 2019-12-01 | End: 2020-02-18

## 2019-12-04 ENCOUNTER — CLINICAL SUPPORT NO REQUIREMENTS (OUTPATIENT)
Dept: CARDIOLOGY | Facility: CLINIC | Age: 74
End: 2019-12-04

## 2019-12-04 DIAGNOSIS — I48.0 PAROXYSMAL ATRIAL FIBRILLATION (HCC): Primary | ICD-10-CM

## 2019-12-04 DIAGNOSIS — Z95.0 PRESENCE OF CARDIAC PACEMAKER: ICD-10-CM

## 2019-12-08 RX ORDER — VERAPAMIL HYDROCHLORIDE 240 MG/1
TABLET, FILM COATED, EXTENDED RELEASE ORAL
Qty: 90 TABLET | Refills: 0 | Status: SHIPPED | OUTPATIENT
Start: 2019-12-08 | End: 2019-12-20

## 2019-12-13 PROCEDURE — 93294 REM INTERROG EVL PM/LDLS PM: CPT | Performed by: NURSE PRACTITIONER

## 2019-12-13 PROCEDURE — 93296 REM INTERROG EVL PM/IDS: CPT | Performed by: NURSE PRACTITIONER

## 2019-12-13 NOTE — PROGRESS NOTES
REMOTE DEVICE INTERROGATION    Remote device interrogation is reviewed.     Device Company: [employer]    Battery Stable.  Time to CORKY 1.5 years    Presenting EGM: A paced V sensed    Arrhythmia Logbook Reviewed: PAF noted remains on anticoagulation    Device function appears Stable    Continue remote device interrogations every 3 months.

## 2019-12-19 ENCOUNTER — LAB (OUTPATIENT)
Dept: FAMILY MEDICINE CLINIC | Facility: CLINIC | Age: 74
End: 2019-12-19

## 2019-12-19 ENCOUNTER — OFFICE VISIT (OUTPATIENT)
Dept: FAMILY MEDICINE CLINIC | Facility: CLINIC | Age: 74
End: 2019-12-19

## 2019-12-19 VITALS
SYSTOLIC BLOOD PRESSURE: 130 MMHG | DIASTOLIC BLOOD PRESSURE: 82 MMHG | TEMPERATURE: 98.2 F | OXYGEN SATURATION: 98 % | HEART RATE: 56 BPM | RESPIRATION RATE: 8 BRPM | WEIGHT: 285 LBS | BODY MASS INDEX: 44.64 KG/M2

## 2019-12-19 DIAGNOSIS — N28.9 RENAL INSUFFICIENCY: ICD-10-CM

## 2019-12-19 DIAGNOSIS — Z79.01 WARFARIN ANTICOAGULATION: ICD-10-CM

## 2019-12-19 DIAGNOSIS — Z79.01 WARFARIN ANTICOAGULATION: Primary | ICD-10-CM

## 2019-12-19 LAB
ALBUMIN SERPL-MCNC: 4 G/DL (ref 3.5–5.2)
ALBUMIN/GLOB SERPL: 1.1 G/DL
ALP SERPL-CCNC: 78 U/L (ref 39–117)
ALT SERPL W P-5'-P-CCNC: 11 U/L (ref 1–33)
ANION GAP SERPL CALCULATED.3IONS-SCNC: 13 MMOL/L (ref 5–15)
AST SERPL-CCNC: 21 U/L (ref 1–32)
BILIRUB SERPL-MCNC: 0.5 MG/DL (ref 0.2–1.2)
BUN BLD-MCNC: 21 MG/DL (ref 8–23)
BUN/CREAT SERPL: 20.6 (ref 7–25)
CALCIUM SPEC-SCNC: 9.6 MG/DL (ref 8.6–10.5)
CHLORIDE SERPL-SCNC: 105 MMOL/L (ref 98–107)
CO2 SERPL-SCNC: 24 MMOL/L (ref 22–29)
CREAT BLD-MCNC: 1.02 MG/DL (ref 0.57–1)
GFR SERPL CREATININE-BSD FRML MDRD: 53 ML/MIN/1.73
GLOBULIN UR ELPH-MCNC: 3.8 GM/DL
GLUCOSE BLD-MCNC: 96 MG/DL (ref 65–99)
INR PPP: 1.44 (ref 2–3)
POTASSIUM BLD-SCNC: 4.1 MMOL/L (ref 3.5–5.2)
PROT SERPL-MCNC: 7.8 G/DL (ref 6–8.5)
PROTHROMBIN TIME: 14.3 SECONDS (ref 19.4–28.5)
SODIUM BLD-SCNC: 142 MMOL/L (ref 136–145)

## 2019-12-19 PROCEDURE — 99213 OFFICE O/P EST LOW 20 MIN: CPT | Performed by: HOSPITALIST

## 2019-12-19 PROCEDURE — 85610 PROTHROMBIN TIME: CPT | Performed by: HOSPITALIST

## 2019-12-19 PROCEDURE — 36415 COLL VENOUS BLD VENIPUNCTURE: CPT | Performed by: HOSPITALIST

## 2019-12-19 PROCEDURE — 80053 COMPREHEN METABOLIC PANEL: CPT | Performed by: HOSPITALIST

## 2019-12-19 NOTE — PROGRESS NOTES
Rooming Tab(CC,VS,Pt Hx,Fall Screen)    Patient Care Team:  Albin Guillen MD as PCP - General (Internal Medicine)  MARIEL Guillen MD as PCP - Claims Attributed    Chief Complaint   Patient presents with   • Follow-up     meds       Subjective     History was provided by the patient.  Here for f/u and check on meds.      Patient states other than the above problems, they are doing ok overall and has no other significant complaints    I have reviewed and updated her medications, medical/family/social history and problem list during today's office visit.       Problem List Tab  Medications Tab  Synopsis Tab  Chart Review Tab  Care Everywhere Tab  Immunizations Tab  Patient History Tab    Social History     Tobacco Use   • Smoking status: Never Smoker   • Smokeless tobacco: Never Used   Substance Use Topics   • Alcohol use: No     Frequency: Never       Review of Systems   Constitutional: Negative for chills and fever.   Respiratory: Negative for chest tightness and shortness of breath.    Cardiovascular: Negative for chest pain.   Gastrointestinal: Negative for abdominal pain.   Musculoskeletal: Negative for arthralgias and myalgias.   Neurological: Negative for headache.       Objective     Rooming Tab(CC,VS,Pt Hx,Fall Screen)  /82 (BP Location: Left arm, Patient Position: Sitting, Cuff Size: Large Adult)   Pulse 56   Temp 98.2 °F (36.8 °C) (Oral)   Resp 8   Wt 129 kg (285 lb)   SpO2 98%   BMI 44.64 kg/m²     Wt Readings from Last 4 Encounters:   12/19/19 129 kg (285 lb)   10/07/19 125 kg (276 lb 9.6 oz)   09/12/19 126 kg (278 lb)   04/09/19 127 kg (281 lb)       Body mass index is 44.64 kg/m².    Physical Exam   Constitutional: She appears well-developed and well-nourished.   HENT:   Head: Normocephalic.   Cardiovascular: Normal rate and regular rhythm.   No murmur heard.  Pulmonary/Chest: Breath sounds normal. She has no wheezes. She has no rales.   Abdominal: Soft. She exhibits no distension.  There is no tenderness.   Skin: Skin is warm and dry.        Statin Choice Calculator  Data Reviewed:               Lab Results   Component Value Date    INR 3.41 (H) 11/22/2019        Assessment/Plan   Order Review Tab  Health Maintenance Tab  Patient Plan/Order Tab  Diagnoses and all orders for this visit:    1. Warfarin anticoagulation (Primary)  -     Protime-INR; Future  -     Comprehensive Metabolic Panel    2. Renal insufficiency  -     Protime-INR; Future  -     Comprehensive Metabolic Panel    If kidney function any worse, we will stop the lisinopril    We went over all of their concerns they brought up during this office visit and they were informed of the diagnosis and treatment plan.  They were directed to f/u for any further problems or concerns.  They were given the opportunity to ask questions related to the visit.  I reinforced the need to keep up on their routine health maintenance items and healthy lifestyle.        Orders Placed This Encounter   Procedures   • Protime-INR     Standing Status:   Future     Standing Expiration Date:   12/19/2020     Order Specific Question:   Is the Patient on Coumadin (Warfarin) therapy?     Answer:   Yes   • Comprehensive Metabolic Panel     Wrapup Tab  Return if symptoms worsen or fail to improve.

## 2019-12-20 RX ORDER — VERAPAMIL HYDROCHLORIDE 240 MG/1
TABLET, FILM COATED, EXTENDED RELEASE ORAL
Qty: 90 TABLET | Refills: 0 | Status: SHIPPED | OUTPATIENT
Start: 2019-12-20 | End: 2020-03-09 | Stop reason: SDUPTHER

## 2019-12-23 RX ORDER — METOPROLOL SUCCINATE 25 MG/1
TABLET, EXTENDED RELEASE ORAL
Qty: 180 TABLET | Refills: 0 | Status: SHIPPED | OUTPATIENT
Start: 2019-12-23 | End: 2020-03-09

## 2020-01-24 DIAGNOSIS — Z79.01 WARFARIN ANTICOAGULATION: Primary | ICD-10-CM

## 2020-01-24 LAB
INR PPP: 3.57 (ref 2–3)
PROTHROMBIN TIME: 33.5 SECONDS (ref 19.4–28.5)

## 2020-01-24 PROCEDURE — 85610 PROTHROMBIN TIME: CPT | Performed by: FAMILY MEDICINE

## 2020-01-24 PROCEDURE — 36415 COLL VENOUS BLD VENIPUNCTURE: CPT

## 2020-02-18 RX ORDER — WARFARIN SODIUM 6 MG/1
TABLET ORAL
Qty: 90 TABLET | Refills: 0 | Status: SHIPPED | OUTPATIENT
Start: 2020-02-18 | End: 2020-03-17

## 2020-03-06 DIAGNOSIS — Z79.01 ANTICOAGULANT LONG-TERM USE: Primary | ICD-10-CM

## 2020-03-09 ENCOUNTER — LAB (OUTPATIENT)
Dept: FAMILY MEDICINE CLINIC | Facility: CLINIC | Age: 75
End: 2020-03-09

## 2020-03-09 DIAGNOSIS — Z79.01 ANTICOAGULANT LONG-TERM USE: ICD-10-CM

## 2020-03-09 LAB
INR PPP: 2.89 (ref 2–3)
PROTHROMBIN TIME: 27.2 SECONDS (ref 19.4–28.5)

## 2020-03-09 PROCEDURE — 85610 PROTHROMBIN TIME: CPT | Performed by: FAMILY MEDICINE

## 2020-03-09 PROCEDURE — 36415 COLL VENOUS BLD VENIPUNCTURE: CPT

## 2020-03-09 RX ORDER — LISINOPRIL AND HYDROCHLOROTHIAZIDE 20; 12.5 MG/1; MG/1
1 TABLET ORAL DAILY
Qty: 90 TABLET | Refills: 3 | Status: SHIPPED | OUTPATIENT
Start: 2020-03-09 | End: 2021-04-14 | Stop reason: SDUPTHER

## 2020-03-09 RX ORDER — METOPROLOL SUCCINATE 25 MG/1
TABLET, EXTENDED RELEASE ORAL
Qty: 180 TABLET | Refills: 0 | Status: SHIPPED | OUTPATIENT
Start: 2020-03-09 | End: 2020-06-03

## 2020-03-09 RX ORDER — VERAPAMIL HYDROCHLORIDE 240 MG/1
240 TABLET, FILM COATED, EXTENDED RELEASE ORAL DAILY
Qty: 90 TABLET | Refills: 0 | Status: SHIPPED | OUTPATIENT
Start: 2020-03-09 | End: 2020-06-05

## 2020-03-17 ENCOUNTER — CLINICAL SUPPORT NO REQUIREMENTS (OUTPATIENT)
Dept: CARDIOLOGY | Facility: CLINIC | Age: 75
End: 2020-03-17

## 2020-03-17 ENCOUNTER — OFFICE VISIT (OUTPATIENT)
Dept: FAMILY MEDICINE CLINIC | Facility: CLINIC | Age: 75
End: 2020-03-17

## 2020-03-17 VITALS
HEIGHT: 67 IN | HEART RATE: 74 BPM | TEMPERATURE: 97.6 F | DIASTOLIC BLOOD PRESSURE: 82 MMHG | OXYGEN SATURATION: 96 % | RESPIRATION RATE: 8 BRPM | SYSTOLIC BLOOD PRESSURE: 130 MMHG | WEIGHT: 293 LBS | BODY MASS INDEX: 45.99 KG/M2

## 2020-03-17 DIAGNOSIS — Z95.0 PRESENCE OF CARDIAC PACEMAKER: ICD-10-CM

## 2020-03-17 DIAGNOSIS — I49.5 SICK SINUS SYNDROME (HCC): Primary | ICD-10-CM

## 2020-03-17 DIAGNOSIS — H61.23 BILATERAL IMPACTED CERUMEN: Primary | ICD-10-CM

## 2020-03-17 PROCEDURE — 93296 REM INTERROG EVL PM/IDS: CPT | Performed by: NURSE PRACTITIONER

## 2020-03-17 PROCEDURE — 99213 OFFICE O/P EST LOW 20 MIN: CPT | Performed by: NURSE PRACTITIONER

## 2020-03-17 PROCEDURE — 69210 REMOVE IMPACTED EAR WAX UNI: CPT | Performed by: NURSE PRACTITIONER

## 2020-03-17 PROCEDURE — 93294 REM INTERROG EVL PM/LDLS PM: CPT | Performed by: NURSE PRACTITIONER

## 2020-03-17 RX ORDER — WARFARIN SODIUM 6 MG/1
TABLET ORAL
Qty: 90 TABLET | Refills: 0 | Status: SHIPPED | OUTPATIENT
Start: 2020-03-17 | End: 2020-10-12

## 2020-03-17 NOTE — PROGRESS NOTES
"Subjective   Arin Singh is a 74 y.o. female.     Chief Complaint   Patient presents with   • Ear Problem       /82 (BP Location: Left arm, Patient Position: Sitting, Cuff Size: Large Adult)   Pulse 74   Temp 97.6 °F (36.4 °C) (Oral)   Resp 8   Ht 170.2 cm (67\")   Wt 133 kg (293 lb)   SpO2 96%   BMI 45.89 kg/m²     BP Readings from Last 3 Encounters:   03/17/20 130/82   12/19/19 130/82   10/07/19 126/72       Wt Readings from Last 3 Encounters:   03/17/20 133 kg (293 lb)   12/19/19 129 kg (285 lb)   10/07/19 125 kg (276 lb 9.6 oz)       Pt comes in today with c/o ear fullness and hearing issues out of right ear. No pain. Has been going on for about 2 weeks. Returned from [place] about 3 weeks ago. Thought maybe it was 2/2 all the swimming. She has tried H2O2, and flushing ear w/o relief.        The following portions of the patient's history were reviewed and updated as appropriate: allergies, current medications, past family history, past medical history, past social history, past surgical history and problem list.    Review of Systems   HENT: Positive for hearing loss. Negative for ear discharge and ear pain.        Objective   Physical Exam   Constitutional: She is oriented to person, place, and time. She appears well-developed and well-nourished.   HENT:   Right Ear: Decreased hearing is noted.   Left Ear: No decreased hearing is noted.   Aimee cerumen impaction.    Eyes: Pupils are equal, round, and reactive to light.   Cardiovascular: Normal rate and regular rhythm.   Pulmonary/Chest: Effort normal and breath sounds normal.   Neurological: She is alert and oriented to person, place, and time.     Ear Cerumen Removal  Date/Time: 3/17/2020 9:06 AM  Performed by: Mayda Mathews APRN  Authorized by: Mayda Mathews APRN     Anesthesia:  Local Anesthetic: none  Location details: right ear  Comments: Right ear was bleeding and lavage was unsuccessful. Pt was instructed to go home use debrox and return " next week and we can try again. She was also given the option to see ENT if no improvement.   Procedure type: instrumentation and irrigation ([place]rette)      Sedation:  Patient sedated: no              Diagnoses and all orders for this visit:    1. Bilateral impacted cerumen (Primary)  Comments:  see notes above     Other orders  -     Ear Cerumen Removal    During this office visit, we discussed the pertinent aspects of the visit and treatment recommendations. Pt verbalizes understanding. Follow up was discussed. Patient was given the opportunity to ask questions and discuss other concerns.       Return if symptoms worsen or fail to improve.

## 2020-03-19 NOTE — PROGRESS NOTES
REMOTE DEVICE INTERROGATION    Received and reviewed on:   March 17, 2020    Remote device interrogation is reviewed.     Device Company: [employer]    Battery Stable.  Time to CORKY 4.5 years    Presenting EGM: A paced V sense    Arrhythmia Logbook Reviewed: Episodes of paroxysmal atrial flutter last episode February 25 was prolonged    Device function appears Stable    Continue remote device interrogations every 3 months.     Will review with Dr. Guillen

## 2020-03-20 ENCOUNTER — TELEPHONE (OUTPATIENT)
Dept: CARDIOLOGY | Facility: CLINIC | Age: 75
End: 2020-03-20

## 2020-04-02 ENCOUNTER — OFFICE VISIT (OUTPATIENT)
Dept: CARDIOLOGY | Facility: CLINIC | Age: 75
End: 2020-04-02

## 2020-04-02 VITALS — HEIGHT: 67 IN | WEIGHT: 293 LBS | BODY MASS INDEX: 45.99 KG/M2

## 2020-04-02 DIAGNOSIS — I49.5 SICK SINUS SYNDROME (HCC): Primary | ICD-10-CM

## 2020-04-02 DIAGNOSIS — I48.0 PAROXYSMAL ATRIAL FIBRILLATION (HCC): ICD-10-CM

## 2020-04-02 DIAGNOSIS — Z95.0 PRESENCE OF CARDIAC PACEMAKER: ICD-10-CM

## 2020-04-02 PROCEDURE — 99214 OFFICE O/P EST MOD 30 MIN: CPT | Performed by: NURSE PRACTITIONER

## 2020-04-02 NOTE — PROGRESS NOTES
CARDIOLOGY   TELE-VISIT        You have chosen to receive care through a telephone visit today. Do you consent to use a telephone visit for your medical care today? Yes        Primary Care Provider:   Albin Guillen MD        Reason for follow up:    Paroxysmal atrial fibrillation  Sick sinus syndrome  Dual-chamber pacemaker  Long-term anticoagulation with warfarin      Subjective       CC:    Denies chest pain or dyspnea    History of Present Illness    Arin Singh is a 74 y.o. female she has a history of sick sinus syndrome requiring previous stool chamber pacemaker implanted back in June 2016.  Additional past medical history is significant for paroxysmal atrial fibrillation, hypertension, dyslipidemia, degenerative joint disease, previous right TKR, previous pulmonary embolism, MTHFR, pulmonary hypertension.    The patient lives in assisted living facility.  She remains socially active but has been self isolating at home for safety.    She denies any recent fever chills cough congestion or ill contacts.    She denies current chest pain, dyspnea, PND, orthopnea, palpitations, near syncope, lower extremity edema or feelings of her heart racing.    Her last INR check was on March 9 and was within range for therapeutic anticoagulation.    Last remote device interrogation from March 17 was reviewed.  The device function is stable.  The patient had had approximately 48 hours of atrial fibrillation back at the end of February.  She denies associated symptoms with that event.      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) 9/9/2019   • Vitamin B12 deficiency 11/2/2017   • Vitamin D deficiency 9/25/2018       Past Surgical History:   Procedure Laterality Date   • INSERT / REPLACE / REMOVE PACEMAKER  2016    BS AV Sequential         Current Outpatient Medications:   •  lisinopril-hydrochlorothiazide (PRINZIDE,ZESTORETIC) 20-12.5 MG per tablet, TAKE 1 TABLET BY MOUTH DAILY, Disp: 90 tablet, Rfl: 3  •  metoprolol succinate XL (TOPROL-XL) 25 MG 24 hr tablet, TAKE 1 TABLET BY MOUTH TWICE DAILY, Disp: 180 tablet, Rfl: 0  •  verapamil SR (CALAN-SR) 240 MG CR tablet, Take 1 tablet by mouth Daily., Disp: 90 tablet, Rfl: 0  •  warfarin (COUMADIN) 6 MG tablet, TAKE 1 TABLET BY MOUTH DAILY AS DIRECTED, Disp: 90 tablet, Rfl: 0  •  warfarin (COUMADIN) 7.5 MG tablet, TAKE 1 TABLET BY MOUTH DAILY OR AS DIRECTED, Disp: 30 tablet, Rfl: 0    Social History     Socioeconomic History   • Marital status:      Spouse name: Not on file   • Number of children: Not on file   • Years of education: Not on file   • Highest education level: Not on file   Tobacco Use   • Smoking status: Never Smoker   • Smokeless tobacco: Never Used   Substance and Sexual Activity   • Alcohol use: No     Frequency: Never   • Drug use: No   • Sexual activity: Defer       History reviewed. No pertinent family history.    The following portions of the patient's history were reviewed and updated as appropriate:  allergies, current medications, family history, social history, surgical history and problem list.     Review of Systems   Constitution: Negative for decreased appetite and diaphoresis.   HENT: Negative for congestion, hearing loss and nosebleeds.    Cardiovascular: Negative for chest pain, claudication, dyspnea on exertion, irregular heartbeat, leg swelling, near-syncope, orthopnea, palpitations, paroxysmal nocturnal dyspnea and syncope.   Respiratory: Negative for cough, shortness of breath and sleep disturbances due to breathing.    Endocrine: Negative for  "polyuria.   Hematologic/Lymphatic: Does not bruise/bleed easily.   Skin: Negative for itching and rash.   Musculoskeletal: Negative for back pain, muscle weakness and myalgias.   Gastrointestinal: Negative for abdominal pain, change in bowel habit and nausea.   Genitourinary: Negative for dysuria, flank pain, frequency and hesitancy.   Neurological: Negative for dizziness, tremors and weakness.   Psychiatric/Behavioral: Negative for altered mental status. The patient does not have insomnia.        Ht 170.2 cm (67.01\")   Wt 133 kg (293 lb)   BMI 45.88 kg/m²     Objective     Patient is alert and oriented.  Respirations seem easy and unlabored  No conversational dyspnea noted  She denies swelling  Answering questions appropriately.        Diagnoses and all orders for this visit:    1. Sick sinus syndrome (CMS/HCC) (Primary)  Comments:  Stable since pacemaker implant    2. Paroxysmal atrial fibrillation (CMS/HCC)  Comments:  Reviewed last remote interrogation.  Last episode of atrial fibrillation was in February.  No recent recurrence    3. Presence of cardiac pacemaker  Comments:  Device function appears stable after reviewing remote device interrogation      Plan:  Continue current medications as prescribed.    She is due to have her INR checked on April 9.  Her dose is been stable.  At this time we will continue her current dose and advised her to reassess toward the end of April and we will consider getting an INR check at that time.  If she develops any bleeding or bruising I have asked her to contact the office sooner.    We will continue remote device interrogations of her pacemaker.  She has been advised to continue current medical therapy.    She will keep her scheduled follow-up in our office in 6 months    This visit has been rescheduled as a phone visit to comply with patient safety concerns in accordance with CDC recommendations. Total time of discussion was 12 minutes.  "

## 2020-05-22 ENCOUNTER — TELEPHONE (OUTPATIENT)
Dept: FAMILY MEDICINE CLINIC | Facility: CLINIC | Age: 75
End: 2020-05-22

## 2020-05-22 NOTE — TELEPHONE ENCOUNTER
PATIENT CALLED FOR PROTIME SHOT. SHE WOULD LIKE IT AS EARLY AS POSSIBLE. TRIED TO TRANSFER.   PLEASE CALL [phone]

## 2020-05-26 DIAGNOSIS — Z79.01 WARFARIN THERAPY STARTED: Primary | ICD-10-CM

## 2020-05-27 ENCOUNTER — LAB (OUTPATIENT)
Dept: FAMILY MEDICINE CLINIC | Facility: CLINIC | Age: 75
End: 2020-05-27

## 2020-05-27 DIAGNOSIS — Z79.01 WARFARIN THERAPY STARTED: ICD-10-CM

## 2020-05-27 LAB
INR PPP: 2.15 (ref 2–3)
PROTHROMBIN TIME: 20.5 SECONDS (ref 19.4–28.5)

## 2020-05-27 PROCEDURE — 36415 COLL VENOUS BLD VENIPUNCTURE: CPT

## 2020-05-27 PROCEDURE — 85610 PROTHROMBIN TIME: CPT | Performed by: FAMILY MEDICINE

## 2020-06-03 RX ORDER — METOPROLOL SUCCINATE 25 MG/1
TABLET, EXTENDED RELEASE ORAL
Qty: 180 TABLET | Refills: 0 | Status: SHIPPED | OUTPATIENT
Start: 2020-06-03 | End: 2020-10-09

## 2020-06-05 RX ORDER — VERAPAMIL HYDROCHLORIDE 240 MG/1
240 TABLET, FILM COATED, EXTENDED RELEASE ORAL DAILY
Qty: 90 TABLET | Refills: 0 | Status: SHIPPED | OUTPATIENT
Start: 2020-06-05 | End: 2020-09-02

## 2020-06-23 ENCOUNTER — CLINICAL SUPPORT NO REQUIREMENTS (OUTPATIENT)
Dept: CARDIOLOGY | Facility: CLINIC | Age: 75
End: 2020-06-23

## 2020-06-23 DIAGNOSIS — I49.5 SICK SINUS SYNDROME (HCC): Primary | ICD-10-CM

## 2020-06-23 DIAGNOSIS — Z95.0 PRESENCE OF CARDIAC PACEMAKER: ICD-10-CM

## 2020-06-23 PROCEDURE — 93296 REM INTERROG EVL PM/IDS: CPT | Performed by: NURSE PRACTITIONER

## 2020-06-23 PROCEDURE — 93294 REM INTERROG EVL PM/LDLS PM: CPT | Performed by: NURSE PRACTITIONER

## 2020-06-23 NOTE — PROGRESS NOTES
REMOTE DEVICE INTERROGATION    Received and reviewed on:   6/23/2020    Remote device interrogation is reviewed.     Device Company: [employer]    Battery Stable.  Time to CORKY 4 years    Presenting EGM: A paced V paced    Arrhythmia Logbook Reviewed: PAF noted anticoagulated with warfarin ventricular rates overall stable    Device function appears Stable    Continue remote device interrogations every 3 months.

## 2020-06-26 ENCOUNTER — LAB (OUTPATIENT)
Dept: FAMILY MEDICINE CLINIC | Facility: CLINIC | Age: 75
End: 2020-06-26

## 2020-06-26 DIAGNOSIS — Z79.01 ANTICOAGULANT LONG-TERM USE: ICD-10-CM

## 2020-06-26 DIAGNOSIS — Z79.01 WARFARIN ANTICOAGULATION: Primary | ICD-10-CM

## 2020-06-26 LAB
INR PPP: 2.5 (ref 2–3)
PROTHROMBIN TIME: 23.7 SECONDS (ref 19.4–28.5)

## 2020-06-26 PROCEDURE — 85610 PROTHROMBIN TIME: CPT | Performed by: FAMILY MEDICINE

## 2020-06-26 PROCEDURE — 36415 COLL VENOUS BLD VENIPUNCTURE: CPT

## 2020-07-28 ENCOUNTER — LAB (OUTPATIENT)
Dept: FAMILY MEDICINE CLINIC | Facility: CLINIC | Age: 75
End: 2020-07-28

## 2020-07-28 DIAGNOSIS — Z79.01 ANTICOAGULANT LONG-TERM USE: Primary | ICD-10-CM

## 2020-07-28 LAB
INR PPP: 2.74 (ref 2–3)
PROTHROMBIN TIME: 25.8 SECONDS (ref 19.4–28.5)

## 2020-07-28 PROCEDURE — 36415 COLL VENOUS BLD VENIPUNCTURE: CPT

## 2020-07-28 PROCEDURE — 85610 PROTHROMBIN TIME: CPT | Performed by: FAMILY MEDICINE

## 2020-08-25 ENCOUNTER — LAB (OUTPATIENT)
Dept: FAMILY MEDICINE CLINIC | Facility: CLINIC | Age: 75
End: 2020-08-25

## 2020-08-25 DIAGNOSIS — Z79.01 LONG TERM (CURRENT) USE OF ANTICOAGULANTS: Primary | ICD-10-CM

## 2020-08-25 LAB
INR PPP: 1.75 (ref 2–3)
PROTHROMBIN TIME: 18.3 SECONDS (ref 19.4–28.5)

## 2020-08-25 PROCEDURE — 36415 COLL VENOUS BLD VENIPUNCTURE: CPT

## 2020-08-25 PROCEDURE — 85610 PROTHROMBIN TIME: CPT | Performed by: FAMILY MEDICINE

## 2020-08-31 ENCOUNTER — TELEPHONE (OUTPATIENT)
Dept: FAMILY MEDICINE CLINIC | Facility: CLINIC | Age: 75
End: 2020-08-31

## 2020-08-31 DIAGNOSIS — Z79.01 LONG TERM (CURRENT) USE OF ANTICOAGULANTS: Primary | ICD-10-CM

## 2020-08-31 NOTE — PROGRESS NOTES
Spoke with pt. She is taking 6mg of coumadin daily. She also has 7.5mg of coumadin at home. She said she has probably been eating a little more salad and greens since it is summer time.  She also has an medicare wellness scheduled with you on 12/9.

## 2020-08-31 NOTE — TELEPHONE ENCOUNTER
Per sharon she is to take 7.5mg m/f and 6mg the rest of the week. Repeat INR in 2 weeks scheduled 9/10

## 2020-09-02 RX ORDER — VERAPAMIL HYDROCHLORIDE 240 MG/1
240 TABLET, FILM COATED, EXTENDED RELEASE ORAL DAILY
Qty: 90 TABLET | Refills: 0 | Status: SHIPPED | OUTPATIENT
Start: 2020-09-02 | End: 2020-11-30

## 2020-09-10 ENCOUNTER — LAB (OUTPATIENT)
Dept: FAMILY MEDICINE CLINIC | Facility: CLINIC | Age: 75
End: 2020-09-10

## 2020-09-10 DIAGNOSIS — Z79.01 LONG TERM (CURRENT) USE OF ANTICOAGULANTS: ICD-10-CM

## 2020-09-10 LAB
INR PPP: 3.28 (ref 2–3)
PROTHROMBIN TIME: 33.1 SECONDS (ref 19.4–28.5)

## 2020-09-10 PROCEDURE — 85610 PROTHROMBIN TIME: CPT | Performed by: FAMILY MEDICINE

## 2020-09-10 PROCEDURE — 36415 COLL VENOUS BLD VENIPUNCTURE: CPT

## 2020-09-25 ENCOUNTER — LAB (OUTPATIENT)
Dept: FAMILY MEDICINE CLINIC | Facility: CLINIC | Age: 75
End: 2020-09-25

## 2020-09-25 ENCOUNTER — FLU SHOT (OUTPATIENT)
Dept: FAMILY MEDICINE CLINIC | Facility: CLINIC | Age: 75
End: 2020-09-25

## 2020-09-25 DIAGNOSIS — Z79.01 LONG TERM (CURRENT) USE OF ANTICOAGULANTS: Primary | ICD-10-CM

## 2020-09-25 DIAGNOSIS — Z23 NEED FOR INFLUENZA VACCINATION: ICD-10-CM

## 2020-09-25 LAB
INR PPP: 2.64 (ref 2–3)
PROTHROMBIN TIME: 27.7 SECONDS (ref 19.4–28.5)

## 2020-09-25 PROCEDURE — G0008 ADMIN INFLUENZA VIRUS VAC: HCPCS | Performed by: INTERNAL MEDICINE

## 2020-09-25 PROCEDURE — 85610 PROTHROMBIN TIME: CPT | Performed by: INTERNAL MEDICINE

## 2020-09-25 PROCEDURE — 90694 VACC AIIV4 NO PRSRV 0.5ML IM: CPT | Performed by: INTERNAL MEDICINE

## 2020-09-25 PROCEDURE — 36415 COLL VENOUS BLD VENIPUNCTURE: CPT

## 2020-10-09 RX ORDER — METOPROLOL SUCCINATE 25 MG/1
TABLET, EXTENDED RELEASE ORAL
Qty: 180 TABLET | Refills: 0 | Status: SHIPPED | OUTPATIENT
Start: 2020-10-09 | End: 2020-12-01

## 2020-10-12 RX ORDER — WARFARIN SODIUM 6 MG/1
TABLET ORAL
Qty: 90 TABLET | Refills: 0 | Status: SHIPPED | OUTPATIENT
Start: 2020-10-12 | End: 2021-01-08

## 2020-10-26 ENCOUNTER — LAB (OUTPATIENT)
Dept: FAMILY MEDICINE CLINIC | Facility: CLINIC | Age: 75
End: 2020-10-26

## 2020-10-26 DIAGNOSIS — Z79.01 ANTICOAGULANT LONG-TERM USE: Primary | ICD-10-CM

## 2020-10-26 LAB
INR PPP: 2.88 (ref 2–3)
PROTHROMBIN TIME: 30.1 SECONDS (ref 19.4–28.5)

## 2020-10-26 PROCEDURE — 36415 COLL VENOUS BLD VENIPUNCTURE: CPT

## 2020-10-26 PROCEDURE — 85610 PROTHROMBIN TIME: CPT | Performed by: FAMILY MEDICINE

## 2020-11-16 ENCOUNTER — OFFICE VISIT (OUTPATIENT)
Dept: CARDIOLOGY | Facility: CLINIC | Age: 75
End: 2020-11-16

## 2020-11-16 ENCOUNTER — CLINICAL SUPPORT NO REQUIREMENTS (OUTPATIENT)
Dept: CARDIOLOGY | Facility: CLINIC | Age: 75
End: 2020-11-16

## 2020-11-16 VITALS
WEIGHT: 293 LBS | SYSTOLIC BLOOD PRESSURE: 134 MMHG | HEART RATE: 70 BPM | DIASTOLIC BLOOD PRESSURE: 72 MMHG | HEIGHT: 67 IN | BODY MASS INDEX: 45.99 KG/M2 | OXYGEN SATURATION: 98 %

## 2020-11-16 DIAGNOSIS — I49.5 SICK SINUS SYNDROME (HCC): Primary | ICD-10-CM

## 2020-11-16 DIAGNOSIS — I10 ESSENTIAL HYPERTENSION: ICD-10-CM

## 2020-11-16 DIAGNOSIS — I48.0 PAROXYSMAL ATRIAL FIBRILLATION (HCC): ICD-10-CM

## 2020-11-16 DIAGNOSIS — Z95.0 PRESENCE OF CARDIAC PACEMAKER: ICD-10-CM

## 2020-11-16 PROCEDURE — 93280 PM DEVICE PROGR EVAL DUAL: CPT | Performed by: NURSE PRACTITIONER

## 2020-11-16 PROCEDURE — 99213 OFFICE O/P EST LOW 20 MIN: CPT | Performed by: INTERNAL MEDICINE

## 2020-11-16 PROCEDURE — 93000 ELECTROCARDIOGRAM COMPLETE: CPT | Performed by: INTERNAL MEDICINE

## 2020-11-18 NOTE — PROGRESS NOTES
DEVICE INTERROGATION:  IN OFFICE    DEVICE TYPE:   Dual-chamber pacemaker    :   Ze[employer]yr    BATTERY:  Stable    TIME TO ELECTIVE REPLACEMENT INDICATORS:   3.5 years    CHARGE TIME:   Not applicable        LEAD DATA:       DEVICE REPROGRAMMED FOR TESTING      Atrial:   Paced mV, 588 ohms, 0.7 V@0.4 ms    Ventricular:     14.1 mV, 648 ohms, 0.7 V@0.4 ms    LV:      Atrial pacing percentage: 60%    Ventricular pacing percentage: 75%      Arrhythmia Logbook Reviewed: 12% A. fib burden since last interrogation        Summary:    Stable Device Function    No significant arhythmia burden.     Battery status is stable.    Reviewed with: Dr. Guillen      NEXT IN OFFICE DEVICE CHECK DUE: 1 year    REMOTE DEVICE INTERROGATIONS: 3 months

## 2020-11-30 RX ORDER — VERAPAMIL HYDROCHLORIDE 240 MG/1
240 TABLET, FILM COATED, EXTENDED RELEASE ORAL DAILY
Qty: 90 TABLET | Refills: 1 | Status: SHIPPED | OUTPATIENT
Start: 2020-11-30 | End: 2021-04-09 | Stop reason: SDUPTHER

## 2020-12-01 RX ORDER — METOPROLOL SUCCINATE 25 MG/1
TABLET, EXTENDED RELEASE ORAL
Qty: 180 TABLET | Refills: 0 | Status: SHIPPED | OUTPATIENT
Start: 2020-12-01 | End: 2021-04-14 | Stop reason: SDUPTHER

## 2020-12-09 ENCOUNTER — LAB (OUTPATIENT)
Dept: FAMILY MEDICINE CLINIC | Facility: CLINIC | Age: 75
End: 2020-12-09

## 2020-12-09 ENCOUNTER — OFFICE VISIT (OUTPATIENT)
Dept: FAMILY MEDICINE CLINIC | Facility: CLINIC | Age: 75
End: 2020-12-09

## 2020-12-09 VITALS
TEMPERATURE: 96.6 F | BODY MASS INDEX: 45.99 KG/M2 | OXYGEN SATURATION: 98 % | HEART RATE: 71 BPM | WEIGHT: 293 LBS | DIASTOLIC BLOOD PRESSURE: 80 MMHG | HEIGHT: 67 IN | SYSTOLIC BLOOD PRESSURE: 128 MMHG

## 2020-12-09 DIAGNOSIS — Z00.00 MEDICARE ANNUAL WELLNESS VISIT, SUBSEQUENT: Primary | ICD-10-CM

## 2020-12-09 DIAGNOSIS — E55.9 VITAMIN D DEFICIENCY: ICD-10-CM

## 2020-12-09 DIAGNOSIS — I48.0 PAROXYSMAL ATRIAL FIBRILLATION (HCC): ICD-10-CM

## 2020-12-09 DIAGNOSIS — E53.8 VITAMIN B12 DEFICIENCY: ICD-10-CM

## 2020-12-09 DIAGNOSIS — Z23 ENCOUNTER FOR IMMUNIZATION: ICD-10-CM

## 2020-12-09 DIAGNOSIS — I10 ESSENTIAL HYPERTENSION: ICD-10-CM

## 2020-12-09 DIAGNOSIS — M17.0 OSTEOARTHRITIS OF BOTH KNEES, UNSPECIFIED OSTEOARTHRITIS TYPE: ICD-10-CM

## 2020-12-09 DIAGNOSIS — I49.5 SICK SINUS SYNDROME (HCC): ICD-10-CM

## 2020-12-09 LAB
25(OH)D3 SERPL-MCNC: 37.6 NG/ML (ref 30–100)
ALBUMIN SERPL-MCNC: 4.1 G/DL (ref 3.5–5.2)
ALBUMIN/GLOB SERPL: 1 G/DL
ALP SERPL-CCNC: 85 U/L (ref 39–117)
ALT SERPL W P-5'-P-CCNC: 14 U/L (ref 1–33)
ANION GAP SERPL CALCULATED.3IONS-SCNC: 9.1 MMOL/L (ref 5–15)
AST SERPL-CCNC: 27 U/L (ref 1–32)
BASOPHILS # BLD AUTO: 0.07 10*3/MM3 (ref 0–0.2)
BASOPHILS NFR BLD AUTO: 1.2 % (ref 0–1.5)
BILIRUB SERPL-MCNC: 0.6 MG/DL (ref 0–1.2)
BUN SERPL-MCNC: 24 MG/DL (ref 8–23)
BUN/CREAT SERPL: 18.3 (ref 7–25)
CALCIUM SPEC-SCNC: 10.1 MG/DL (ref 8.6–10.5)
CHLORIDE SERPL-SCNC: 101 MMOL/L (ref 98–107)
CHOLEST SERPL-MCNC: 212 MG/DL (ref 0–200)
CO2 SERPL-SCNC: 27.9 MMOL/L (ref 22–29)
CREAT SERPL-MCNC: 1.31 MG/DL (ref 0.57–1)
DEPRECATED RDW RBC AUTO: 42.2 FL (ref 37–54)
EOSINOPHIL # BLD AUTO: 0.24 10*3/MM3 (ref 0–0.4)
EOSINOPHIL NFR BLD AUTO: 4.1 % (ref 0.3–6.2)
ERYTHROCYTE [DISTWIDTH] IN BLOOD BY AUTOMATED COUNT: 12.3 % (ref 12.3–15.4)
GFR SERPL CREATININE-BSD FRML MDRD: 39 ML/MIN/1.73
GLOBULIN UR ELPH-MCNC: 4 GM/DL
GLUCOSE SERPL-MCNC: 105 MG/DL (ref 65–99)
HBA1C MFR BLD: 5.4 % (ref 4.8–5.6)
HCT VFR BLD AUTO: 47.5 % (ref 34–46.6)
HDLC SERPL-MCNC: 61 MG/DL (ref 40–60)
HGB BLD-MCNC: 16 G/DL (ref 12–15.9)
IMM GRANULOCYTES # BLD AUTO: 0.02 10*3/MM3 (ref 0–0.05)
IMM GRANULOCYTES NFR BLD AUTO: 0.3 % (ref 0–0.5)
INR PPP: 2.11 (ref 2–3)
LDLC SERPL CALC-MCNC: 135 MG/DL (ref 0–100)
LDLC/HDLC SERPL: 2.18 {RATIO}
LYMPHOCYTES # BLD AUTO: 2.07 10*3/MM3 (ref 0.7–3.1)
LYMPHOCYTES NFR BLD AUTO: 35 % (ref 19.6–45.3)
MCH RBC QN AUTO: 31.1 PG (ref 26.6–33)
MCHC RBC AUTO-ENTMCNC: 33.7 G/DL (ref 31.5–35.7)
MCV RBC AUTO: 92.2 FL (ref 79–97)
MONOCYTES # BLD AUTO: 0.66 10*3/MM3 (ref 0.1–0.9)
MONOCYTES NFR BLD AUTO: 11.1 % (ref 5–12)
NEUTROPHILS NFR BLD AUTO: 2.86 10*3/MM3 (ref 1.7–7)
NEUTROPHILS NFR BLD AUTO: 48.3 % (ref 42.7–76)
NRBC BLD AUTO-RTO: 0 /100 WBC (ref 0–0.2)
PLATELET # BLD AUTO: 213 10*3/MM3 (ref 140–450)
PMV BLD AUTO: 11.9 FL (ref 6–12)
POTASSIUM SERPL-SCNC: 4.2 MMOL/L (ref 3.5–5.2)
PROT SERPL-MCNC: 8.1 G/DL (ref 6–8.5)
PROTHROMBIN TIME: 22.4 SECONDS (ref 19.4–28.5)
RBC # BLD AUTO: 5.15 10*6/MM3 (ref 3.77–5.28)
SODIUM SERPL-SCNC: 138 MMOL/L (ref 136–145)
T4 FREE SERPL-MCNC: 1.46 NG/DL (ref 0.93–1.7)
TRIGL SERPL-MCNC: 89 MG/DL (ref 0–150)
TSH SERPL DL<=0.05 MIU/L-ACNC: 1.71 UIU/ML (ref 0.27–4.2)
VIT B12 BLD-MCNC: 774 PG/ML (ref 211–946)
VLDLC SERPL-MCNC: 16 MG/DL (ref 5–40)
WBC # BLD AUTO: 5.92 10*3/MM3 (ref 3.4–10.8)

## 2020-12-09 PROCEDURE — 85610 PROTHROMBIN TIME: CPT | Performed by: FAMILY MEDICINE

## 2020-12-09 PROCEDURE — 82306 VITAMIN D 25 HYDROXY: CPT | Performed by: FAMILY MEDICINE

## 2020-12-09 PROCEDURE — 84443 ASSAY THYROID STIM HORMONE: CPT | Performed by: FAMILY MEDICINE

## 2020-12-09 PROCEDURE — 80061 LIPID PANEL: CPT | Performed by: FAMILY MEDICINE

## 2020-12-09 PROCEDURE — G0009 ADMIN PNEUMOCOCCAL VACCINE: HCPCS | Performed by: FAMILY MEDICINE

## 2020-12-09 PROCEDURE — 84439 ASSAY OF FREE THYROXINE: CPT | Performed by: FAMILY MEDICINE

## 2020-12-09 PROCEDURE — 90732 PPSV23 VACC 2 YRS+ SUBQ/IM: CPT | Performed by: FAMILY MEDICINE

## 2020-12-09 PROCEDURE — 36415 COLL VENOUS BLD VENIPUNCTURE: CPT | Performed by: FAMILY MEDICINE

## 2020-12-09 PROCEDURE — 80053 COMPREHEN METABOLIC PANEL: CPT | Performed by: FAMILY MEDICINE

## 2020-12-09 PROCEDURE — 82607 VITAMIN B-12: CPT | Performed by: FAMILY MEDICINE

## 2020-12-09 PROCEDURE — 99213 OFFICE O/P EST LOW 20 MIN: CPT | Performed by: FAMILY MEDICINE

## 2020-12-09 PROCEDURE — 85025 COMPLETE CBC W/AUTO DIFF WBC: CPT | Performed by: FAMILY MEDICINE

## 2020-12-09 PROCEDURE — 83036 HEMOGLOBIN GLYCOSYLATED A1C: CPT | Performed by: FAMILY MEDICINE

## 2020-12-09 PROCEDURE — G0439 PPPS, SUBSEQ VISIT: HCPCS | Performed by: FAMILY MEDICINE

## 2020-12-09 NOTE — PROGRESS NOTES
The ABCs of the Annual Wellness Visit  Subsequent Medicare Wellness Visit    Chief Complaint   Patient presents with   • Medicare Wellness-subsequent       Subjective   History of Present Illness:  Arin Singh is a 75 y.o. female who presents for a Subsequent Medicare Wellness Visit.    HTN: 128/80 today. Well controlled. Maintained on Prinzide 20/12.5, metoprolol 25, verapamil 240 daily. No HA, LH/dizziness, CP or SOB    SSS: s/p pacemaker placement. Follows w/ [facility]- [facility]. No     Paroxysmal afib: well controlled. Asymptomatic w/ afib. Maintained on metoprolol 25 and verapamil 240 daily as well as warfarin 6mg 6 days/week and 7.5mg 1 day/week.     Patient complains of chronic knee pain that is moderately controlled and snoring that intermittently awakens her from sleep.    HEALTH RISK ASSESSMENT    Recent Hospitalizations:  No hospitalization(s) within the last year.    Current Medical Providers:  Patient Care Team:  Albin Guillen MD as PCP - General (Internal Medicine)    Smoking Status:  Social History     Tobacco Use   Smoking Status Never Smoker   Smokeless Tobacco Never Used     Alcohol Consumption:  Social History     Substance and Sexual Activity   Alcohol Use No   • Frequency: Never     Depression Screen:   PHQ-2/PHQ-9 Depression Screening 12/9/2020   Little interest or pleasure in doing things 0   Feeling down, depressed, or hopeless 0   Total Score 0     Fall Risk Screen:  STEADI Fall Risk Assessment was completed, and patient is at LOW risk for falls.Assessment completed on:12/9/2020    Health Habits and Functional and Cognitive Screening:  Functional & Cognitive Status 12/9/2020   Do you have difficulty preparing food and eating? No   Do you have difficulty bathing yourself, getting dressed or grooming yourself? No   Do you have difficulty using the toilet? No   Do you have difficulty moving around from place to place? No   Do you have trouble with steps or getting out of a bed or a  chair? No   Current Diet Well Balanced Diet   Dental Exam Not up to date   Eye Exam Up to date   Exercise (times per week) 3 times per week   Current Exercise Activities Include Walking   Do you need help using the phone?  No   Are you deaf or do you have serious difficulty hearing?  No   Do you need help with transportation? No   Do you need help shopping? No   Do you need help preparing meals?  No   Do you need help with housework?  No   Do you need help with laundry? No   Do you need help taking your medications? No   Do you need help managing money? No   Do you ever drive or ride in a car without wearing a seat belt? No   Have you felt unusual stress, anger or loneliness in the last month? No   Who do you live with? Alone   If you need help, do you have trouble finding someone available to you? No   Have you been bothered in the last four weeks by sexual problems? No   Do you have difficulty concentrating, remembering or making decisions? No     Does the patient have evidence of cognitive impairment? No    Asprin use counseling:Does not need ASA (and currently is not on it)    Age-appropriate Screening Schedule:  Refer to the list below for future screening recommendations based on patient's age, sex and/or medical conditions. Orders for these recommended tests are listed in the plan section. The patient has been provided with a written plan.    Health Maintenance   Topic Date Due   • URINE MICROALBUMIN  1945   • TDAP/TD VACCINES (1 - Tdap) 07/28/1962   • ZOSTER VACCINE (2 of 3) 01/30/2015   • DIABETIC EYE EXAM  06/27/2019   • HEMOGLOBIN A1C  03/12/2020   • LIPID PANEL  09/12/2020   • INFLUENZA VACCINE  Completed          The following portions of the patient's history were reviewed and updated as appropriate: allergies, current medications, past family history, past medical history, past social history, past surgical history and problem list.    Outpatient Medications Prior to Visit   Medication Sig  Dispense Refill   • lisinopril-hydrochlorothiazide (PRINZIDE,ZESTORETIC) 20-12.5 MG per tablet TAKE 1 TABLET BY MOUTH DAILY 90 tablet 3   • metoprolol succinate XL (TOPROL-XL) 25 MG 24 hr tablet TAKE 1 TABLET BY MOUTH TWICE DAILY 180 tablet 0   • verapamil SR (CALAN-SR) 240 MG CR tablet TAKE 1 TABLET BY MOUTH DAILY 90 tablet 1   • warfarin (COUMADIN) 6 MG tablet TAKE 1 TABLET BY MOUTH DAILY AS DIRECTED 90 tablet 0   • warfarin (COUMADIN) 7.5 MG tablet TAKE 1 TABLET BY MOUTH DAILY OR AS DIRECTED 30 tablet 0     No facility-administered medications prior to visit.        Patient Active Problem List   Diagnosis   • Sick sinus syndrome (CMS/HCC)   • Presence of cardiac pacemaker   • Absolute anemia   • Coagulation factor deficiency syndrome (CMS/HCC)   • Hyperlipidemia, mixed   • Essential hypertension   • Osteoarthritis of knee   • Paroxysmal atrial fibrillation (CMS/HCC)   • Vitamin B12 deficiency   • Vitamin D deficiency   • Warfarin therapy started   • Screening for breast cancer       Advanced Care Planning:  ACP discussion was held with the patient during this visit. Patient has an advance directive in EMR which is still valid.     Review of Systems   Constitutional: Negative for chills, fatigue and unexpected weight change.   HENT: Negative for congestion and rhinorrhea.    Eyes: Negative for visual disturbance.   Respiratory: Negative for cough and shortness of breath.    Cardiovascular: Negative for chest pain and palpitations.   Gastrointestinal: Negative for abdominal pain, constipation, diarrhea, nausea and vomiting.   Genitourinary: Negative for difficulty urinating.   Musculoskeletal: Positive for arthralgias. Negative for joint swelling.   Skin: Negative for rash.   Neurological: Negative for dizziness, weakness, light-headedness and headaches.   Psychiatric/Behavioral: Negative for dysphoric mood. The patient is not nervous/anxious.      Compared to one year ago, the patient feels her physical health is  "the same.  Compared to one year ago, the patient feels her mental health is the same.    Reviewed chart for potential of high risk medication in the elderly: yes  Reviewed chart for potential of harmful drug interactions in the elderly:yes    Objective         Vitals:    12/09/20 0829   BP: 128/80   BP Location: Left arm   Patient Position: Sitting   Cuff Size: Adult   Pulse: 71   Temp: 96.6 °F (35.9 °C)   TempSrc: Temporal   SpO2: 98%   Weight: (!) 137 kg (303 lb)   Height: 170.2 cm (67\")       Body mass index is 47.46 kg/m².  Discussed the patient's BMI with her. The BMI is above average; BMI management plan is completed.    Physical Exam  Constitutional:       General: She is not in acute distress.     Appearance: She is well-developed. She is obese.   HENT:      Head: Normocephalic and atraumatic.      Right Ear: External ear normal.      Left Ear: External ear normal.      Nose: Nose normal.      Mouth/Throat:      Mouth: Mucous membranes are moist.      Pharynx: Oropharynx is clear. No oropharyngeal exudate or posterior oropharyngeal erythema.      Comments: Mallampati 3  Eyes:      General: No scleral icterus.        Right eye: No discharge.         Left eye: No discharge.      Conjunctiva/sclera: Conjunctivae normal.      Pupils: Pupils are equal, round, and reactive to light.   Neck:      Musculoskeletal: Normal range of motion and neck supple.      Thyroid: No thyromegaly.   Cardiovascular:      Rate and Rhythm: Normal rate and regular rhythm.      Heart sounds: Normal heart sounds. No murmur.   Pulmonary:      Effort: Pulmonary effort is normal. No respiratory distress.      Breath sounds: Normal breath sounds. No wheezing or rales.   Abdominal:      General: Bowel sounds are normal. There is no distension.      Palpations: Abdomen is soft.      Tenderness: There is no abdominal tenderness.   Musculoskeletal: Normal range of motion.         General: No deformity.   Lymphadenopathy:      Cervical: No " cervical adenopathy.   Skin:     General: Skin is warm and dry.      Capillary Refill: Capillary refill takes less than 2 seconds.      Findings: No rash.   Neurological:      Mental Status: She is alert and oriented to person, place, and time.      Cranial Nerves: No cranial nerve deficit.      Sensory: No sensory deficit.   Psychiatric:         Behavior: Behavior normal.         Thought Content: Thought content normal.       Assessment/Plan   Medicare Risks and Personalized Health Plan  CMS Preventative Services Quick Reference  Advance Directive Discussion  Breast Cancer/Mammogram Screening  Cardiovascular risk  Chronic Pain   Colon Cancer Screening  Dementia/Memory   Depression/Dysphoria  Diabetic Lab Screening   Fall Risk  Immunizations Discussed/Encouraged (specific immunizations; adacel Tdap and Pneumococcal 23 )  Obesity/Overweight   Osteoprorosis Risk  Polypharmacy    The above risks/problems have been discussed with the patient.  Pertinent information has been shared with the patient in the After Visit Summary.  Follow up plans and orders are seen below in the Assessment/Plan Section.    Diagnoses and all orders for this visit:    1. Medicare annual wellness visit, subsequent (Primary)  -     CBC & Differential  -     Comprehensive Metabolic Panel  -     Hemoglobin A1c  -     Lipid Panel  -     T4, Free  -     TSH  -     Vitamin D 25 Hydroxy  -     Vitamin B12  -     Pneumococcal Polysaccharide Vaccine 23-Valent Greater Than or Equal To 1yo Subcutaneous / IM  -     CBC Auto Differential  -  Counseled regarding diet, exercise, weight loss, and preventative health maintenance items/immunizations below    2. Essential hypertension: 128/80 today  -     Comprehensive Metabolic Panel  - Continue home Prinzide 20/12.5 daily, metoprolol 25, verapamil 240 daily    3. Paroxysmal atrial fibrillation (CMS/[facility]): [id number] score of 3  -     Protime-INR  - Continue home metoprolol 25 and verapamil 240  daily  - Continue warfarin 6 mg 6 days/week and 7.5 mg 1 day/week  - Continue cardiology follow-up    4. Sick sinus syndrome (CMS/HCC): Status post pacemaker placement   -Continue cardiology [id number]    5. Vitamin B12 deficiency  -     Vitamin B12    6. Vitamin D deficiency  -     Vitamin D 25 Hydroxy    7. Osteoarthritis of both knees, unspecified osteoarthritis type: Status post right knee replacement.  Not interested in further surgery at this time.  NSAIDs limited secondary to renal function  -     Comprehensive Metabolic Panel  -     Start Diclofenac Sodium (VOLTAREN) 1 % gel gel; Apply 4 g topically to the appropriate area as directed 4 (Four) Times a Day As Needed (joint pain).  Dispense: 100 g; Refill: 5  - Recommend Tylenol as needed    8. Encounter for immunization   -     Pneumococcal Polysaccharide Vaccine 23-Valent Greater Than or Equal To 3yo Subcutaneous / IM      Follow Up:  Return in about 1 year (around 12/9/2021) for Medicare Wellness.     An After Visit Summary and PPPS were given to the patient.     Preventative  Colonoscopy: 2014, good for 10 years  Mammogram: 9/2019, prefers to wait today 2/2 COVID  Pap smear: aged out  DEXA: Recommended, deferred 2/2 COVID  Shingles: 12/2014  Pneumonia: Prevnar 10/2015, Pneumovax ordered today  Tdap: Recommended, declined  Influenza: 9/2020    Patient has been erroneously marked as diabetic. Based on the available clinical information, she does not have diabetes and should therefore be excluded from diabetic health maintenance and quality measures for the remainder of the reporting period.

## 2020-12-11 DIAGNOSIS — D58.2 ABNORMAL HEMOGLOBIN (HGB) (HCC): ICD-10-CM

## 2020-12-11 DIAGNOSIS — E78.5 HYPERLIPIDEMIA, UNSPECIFIED HYPERLIPIDEMIA TYPE: Primary | ICD-10-CM

## 2020-12-11 RX ORDER — ATORVASTATIN CALCIUM 20 MG/1
20 TABLET, FILM COATED ORAL DAILY
Qty: 90 TABLET | Refills: 3 | Status: SHIPPED | OUTPATIENT
Start: 2020-12-11 | End: 2021-10-06

## 2020-12-11 NOTE — PROGRESS NOTES
ZANDER pt does not want to try medication at this time concerned about all the side effects. Wants to try diet and exercise first. She said she has not been doing what she is supposed to.

## 2021-01-06 ENCOUNTER — LAB (OUTPATIENT)
Dept: FAMILY MEDICINE CLINIC | Facility: CLINIC | Age: 76
End: 2021-01-06

## 2021-01-06 DIAGNOSIS — E78.5 HYPERLIPIDEMIA, UNSPECIFIED HYPERLIPIDEMIA TYPE: ICD-10-CM

## 2021-01-06 DIAGNOSIS — D58.2 ABNORMAL HEMOGLOBIN (HGB) (HCC): ICD-10-CM

## 2021-01-06 LAB
BASOPHILS # BLD AUTO: 0.06 10*3/MM3 (ref 0–0.2)
BASOPHILS NFR BLD AUTO: 1 % (ref 0–1.5)
CHOLEST SERPL-MCNC: 196 MG/DL (ref 0–200)
DEPRECATED RDW RBC AUTO: 43.3 FL (ref 37–54)
EOSINOPHIL # BLD AUTO: 0.27 10*3/MM3 (ref 0–0.4)
EOSINOPHIL NFR BLD AUTO: 4.5 % (ref 0.3–6.2)
ERYTHROCYTE [DISTWIDTH] IN BLOOD BY AUTOMATED COUNT: 12.7 % (ref 12.3–15.4)
HCT VFR BLD AUTO: 44.3 % (ref 34–46.6)
HDLC SERPL-MCNC: 53 MG/DL (ref 40–60)
HGB BLD-MCNC: 14.9 G/DL (ref 12–15.9)
IMM GRANULOCYTES # BLD AUTO: 0.01 10*3/MM3 (ref 0–0.05)
IMM GRANULOCYTES NFR BLD AUTO: 0.2 % (ref 0–0.5)
LDLC SERPL CALC-MCNC: 127 MG/DL (ref 0–100)
LDLC/HDLC SERPL: 2.36 {RATIO}
LYMPHOCYTES # BLD AUTO: 2.15 10*3/MM3 (ref 0.7–3.1)
LYMPHOCYTES NFR BLD AUTO: 35.7 % (ref 19.6–45.3)
MCH RBC QN AUTO: 31.8 PG (ref 26.6–33)
MCHC RBC AUTO-ENTMCNC: 33.6 G/DL (ref 31.5–35.7)
MCV RBC AUTO: 94.7 FL (ref 79–97)
MONOCYTES # BLD AUTO: 0.71 10*3/MM3 (ref 0.1–0.9)
MONOCYTES NFR BLD AUTO: 11.8 % (ref 5–12)
NEUTROPHILS NFR BLD AUTO: 2.83 10*3/MM3 (ref 1.7–7)
NEUTROPHILS NFR BLD AUTO: 46.8 % (ref 42.7–76)
NRBC BLD AUTO-RTO: 0 /100 WBC (ref 0–0.2)
PLATELET # BLD AUTO: 197 10*3/MM3 (ref 140–450)
PMV BLD AUTO: 12.2 FL (ref 6–12)
RBC # BLD AUTO: 4.68 10*6/MM3 (ref 3.77–5.28)
TRIGL SERPL-MCNC: 89 MG/DL (ref 0–150)
VLDLC SERPL-MCNC: 16 MG/DL (ref 5–40)
WBC # BLD AUTO: 6.03 10*3/MM3 (ref 3.4–10.8)

## 2021-01-06 PROCEDURE — 80061 LIPID PANEL: CPT | Performed by: FAMILY MEDICINE

## 2021-01-06 PROCEDURE — 85025 COMPLETE CBC W/AUTO DIFF WBC: CPT | Performed by: FAMILY MEDICINE

## 2021-01-06 PROCEDURE — 36415 COLL VENOUS BLD VENIPUNCTURE: CPT

## 2021-01-08 DIAGNOSIS — Z79.01 LONG TERM (CURRENT) USE OF ANTICOAGULANTS: Primary | ICD-10-CM

## 2021-01-08 RX ORDER — WARFARIN SODIUM 6 MG/1
TABLET ORAL
Qty: 90 TABLET | Refills: 2 | Status: SHIPPED | OUTPATIENT
Start: 2021-01-08 | End: 2021-04-09 | Stop reason: ALTCHOICE

## 2021-01-11 ENCOUNTER — LAB (OUTPATIENT)
Dept: FAMILY MEDICINE CLINIC | Facility: CLINIC | Age: 76
End: 2021-01-11

## 2021-01-11 DIAGNOSIS — Z79.01 LONG TERM (CURRENT) USE OF ANTICOAGULANTS: ICD-10-CM

## 2021-01-11 LAB
INR PPP: 2.32 (ref 2–3)
PROTHROMBIN TIME: 24.5 SECONDS (ref 19.4–28.5)

## 2021-01-11 PROCEDURE — 36415 COLL VENOUS BLD VENIPUNCTURE: CPT

## 2021-01-11 PROCEDURE — 85610 PROTHROMBIN TIME: CPT | Performed by: FAMILY MEDICINE

## 2021-02-22 ENCOUNTER — LAB (OUTPATIENT)
Dept: FAMILY MEDICINE CLINIC | Facility: CLINIC | Age: 76
End: 2021-02-22

## 2021-02-22 DIAGNOSIS — Z79.01 LONG TERM (CURRENT) USE OF ANTICOAGULANTS: ICD-10-CM

## 2021-02-22 LAB
INR PPP: 2.64 (ref 2–3)
PROTHROMBIN TIME: 27.7 SECONDS (ref 19.4–28.5)

## 2021-02-22 PROCEDURE — 85610 PROTHROMBIN TIME: CPT | Performed by: FAMILY MEDICINE

## 2021-02-22 PROCEDURE — 36415 COLL VENOUS BLD VENIPUNCTURE: CPT | Performed by: FAMILY MEDICINE

## 2021-03-01 RX ORDER — LISINOPRIL AND HYDROCHLOROTHIAZIDE 20; 12.5 MG/1; MG/1
1 TABLET ORAL DAILY
Qty: 90 TABLET | Refills: 3 | OUTPATIENT
Start: 2021-03-01

## 2021-03-22 ENCOUNTER — LAB (OUTPATIENT)
Dept: FAMILY MEDICINE CLINIC | Facility: CLINIC | Age: 76
End: 2021-03-22

## 2021-03-22 DIAGNOSIS — D58.2 ABNORMAL HEMOGLOBIN (HGB) (HCC): ICD-10-CM

## 2021-03-22 DIAGNOSIS — E78.2 HYPERLIPIDEMIA, MIXED: Primary | ICD-10-CM

## 2021-03-22 DIAGNOSIS — Z79.01 LONG TERM (CURRENT) USE OF ANTICOAGULANTS: ICD-10-CM

## 2021-03-22 LAB
BASOPHILS # BLD AUTO: 0.04 10*3/MM3 (ref 0–0.2)
BASOPHILS NFR BLD AUTO: 0.8 % (ref 0–1.5)
CHOLEST SERPL-MCNC: 168 MG/DL (ref 0–200)
DEPRECATED RDW RBC AUTO: 43.3 FL (ref 37–54)
EOSINOPHIL # BLD AUTO: 0.19 10*3/MM3 (ref 0–0.4)
EOSINOPHIL NFR BLD AUTO: 3.6 % (ref 0.3–6.2)
ERYTHROCYTE [DISTWIDTH] IN BLOOD BY AUTOMATED COUNT: 12.4 % (ref 12.3–15.4)
HCT VFR BLD AUTO: 45.8 % (ref 34–46.6)
HDLC SERPL-MCNC: 45 MG/DL (ref 40–60)
HGB BLD-MCNC: 15.2 G/DL (ref 12–15.9)
IMM GRANULOCYTES # BLD AUTO: 0.01 10*3/MM3 (ref 0–0.05)
IMM GRANULOCYTES NFR BLD AUTO: 0.2 % (ref 0–0.5)
INR PPP: 2.56 (ref 2–3)
LDLC SERPL CALC-MCNC: 102 MG/DL (ref 0–100)
LDLC/HDLC SERPL: 2.21 {RATIO}
LYMPHOCYTES # BLD AUTO: 1.83 10*3/MM3 (ref 0.7–3.1)
LYMPHOCYTES NFR BLD AUTO: 34.9 % (ref 19.6–45.3)
MCH RBC QN AUTO: 31.6 PG (ref 26.6–33)
MCHC RBC AUTO-ENTMCNC: 33.2 G/DL (ref 31.5–35.7)
MCV RBC AUTO: 95.2 FL (ref 79–97)
MONOCYTES # BLD AUTO: 0.58 10*3/MM3 (ref 0.1–0.9)
MONOCYTES NFR BLD AUTO: 11.1 % (ref 5–12)
NEUTROPHILS NFR BLD AUTO: 2.59 10*3/MM3 (ref 1.7–7)
NEUTROPHILS NFR BLD AUTO: 49.4 % (ref 42.7–76)
NRBC BLD AUTO-RTO: 0 /100 WBC (ref 0–0.2)
PLATELET # BLD AUTO: 194 10*3/MM3 (ref 140–450)
PMV BLD AUTO: 11.8 FL (ref 6–12)
PROTHROMBIN TIME: 26.9 SECONDS (ref 19.4–28.5)
RBC # BLD AUTO: 4.81 10*6/MM3 (ref 3.77–5.28)
TRIGL SERPL-MCNC: 117 MG/DL (ref 0–150)
VLDLC SERPL-MCNC: 21 MG/DL (ref 5–40)
WBC # BLD AUTO: 5.24 10*3/MM3 (ref 3.4–10.8)

## 2021-03-22 PROCEDURE — 36415 COLL VENOUS BLD VENIPUNCTURE: CPT

## 2021-03-22 PROCEDURE — 85610 PROTHROMBIN TIME: CPT | Performed by: FAMILY MEDICINE

## 2021-03-22 PROCEDURE — 80061 LIPID PANEL: CPT | Performed by: FAMILY MEDICINE

## 2021-03-22 PROCEDURE — 85025 COMPLETE CBC W/AUTO DIFF WBC: CPT | Performed by: FAMILY MEDICINE

## 2021-04-02 PROCEDURE — 93294 REM INTERROG EVL PM/LDLS PM: CPT | Performed by: NURSE PRACTITIONER

## 2021-04-02 PROCEDURE — 93296 REM INTERROG EVL PM/IDS: CPT | Performed by: NURSE PRACTITIONER

## 2021-04-10 RX ORDER — VERAPAMIL HYDROCHLORIDE 240 MG/1
240 TABLET, FILM COATED, EXTENDED RELEASE ORAL DAILY
Qty: 90 TABLET | Refills: 1 | Status: SHIPPED | OUTPATIENT
Start: 2021-04-10 | End: 2021-04-10 | Stop reason: SDUPTHER

## 2021-04-10 RX ORDER — WARFARIN SODIUM 6 MG/1
6 TABLET ORAL DAILY
Qty: 90 TABLET | Refills: 1 | Status: SHIPPED | OUTPATIENT
Start: 2021-04-10 | End: 2021-04-13 | Stop reason: SDUPTHER

## 2021-04-10 RX ORDER — VERAPAMIL HYDROCHLORIDE 240 MG/1
240 TABLET, FILM COATED, EXTENDED RELEASE ORAL DAILY
Qty: 90 TABLET | Refills: 1 | Status: SHIPPED | OUTPATIENT
Start: 2021-04-10 | End: 2021-04-13 | Stop reason: SDUPTHER

## 2021-04-13 NOTE — TELEPHONE ENCOUNTER
Caller: Arin Singh    Relationship: Self    Best call back number: [phone]    Medication needed:   Requested Prescriptions     Pending Prescriptions Disp Refills   • warfarin (COUMADIN) 6 MG tablet 90 tablet 1     Sig: Take 1 tablet by mouth Daily. as directed   WARFARIN 7.5 MG 1 TABLET ONCE A WEEK (NEW PRESCRIPTION NEEDED)    When do you need the refill by: TODAY    What additional details did the patient provide when requesting the   medication: [facility] IS NEW [facility]    Does the patient have less than a 3 day supply:  [x] Yes  [] No    What is the patient's preferred pharmacy: [facility] - [address] [phone] [facility] [phone]

## 2021-04-14 RX ORDER — WARFARIN SODIUM 7.5 MG/1
7.5 TABLET ORAL DAILY
Qty: 90 TABLET | Refills: 1 | Status: SHIPPED | OUTPATIENT
Start: 2021-04-14 | End: 2022-06-15

## 2021-04-14 RX ORDER — VERAPAMIL HYDROCHLORIDE 240 MG/1
240 TABLET, FILM COATED, EXTENDED RELEASE ORAL DAILY
Qty: 90 TABLET | Refills: 1 | Status: SHIPPED | OUTPATIENT
Start: 2021-04-14 | End: 2021-04-28 | Stop reason: SDUPTHER

## 2021-04-14 RX ORDER — LISINOPRIL AND HYDROCHLOROTHIAZIDE 20; 12.5 MG/1; MG/1
1 TABLET ORAL DAILY
Qty: 90 TABLET | Refills: 1 | Status: SHIPPED | OUTPATIENT
Start: 2021-04-14 | End: 2021-08-11

## 2021-04-14 RX ORDER — VERAPAMIL HYDROCHLORIDE 240 MG/1
240 TABLET, FILM COATED, EXTENDED RELEASE ORAL DAILY
Qty: 90 TABLET | Refills: 1 | Status: SHIPPED | OUTPATIENT
Start: 2021-04-14 | End: 2021-04-14 | Stop reason: SDUPTHER

## 2021-04-14 RX ORDER — METOPROLOL SUCCINATE 25 MG/1
25 TABLET, EXTENDED RELEASE ORAL 2 TIMES DAILY
Qty: 180 TABLET | Refills: 1 | Status: SHIPPED | OUTPATIENT
Start: 2021-04-14 | End: 2021-08-11

## 2021-04-22 ENCOUNTER — LAB (OUTPATIENT)
Dept: FAMILY MEDICINE CLINIC | Facility: CLINIC | Age: 76
End: 2021-04-22

## 2021-04-22 DIAGNOSIS — Z79.01 LONG TERM (CURRENT) USE OF ANTICOAGULANTS: ICD-10-CM

## 2021-04-22 LAB
INR PPP: 2.3 (ref 2–3)
PROTHROMBIN TIME: 24.3 SECONDS (ref 19.4–28.5)

## 2021-04-22 PROCEDURE — 85610 PROTHROMBIN TIME: CPT | Performed by: FAMILY MEDICINE

## 2021-04-22 PROCEDURE — 36415 COLL VENOUS BLD VENIPUNCTURE: CPT

## 2021-04-28 NOTE — TELEPHONE ENCOUNTER
Caller: Arin Singh    Relationship: Self    Best call back number:[phone]    Medication needed:   Requested Prescriptions     Pending Prescriptions Disp Refills   • verapamil SR (CALAN-SR) 240 MG CR tablet 90 tablet 1     Sig: Take 1 tablet by mouth Daily.   • warfarin (COUMADIN) 6 MG tablet 90 tablet 1     Sig: Take 1 tablet by mouth Daily. as directed       When do you need the refill by: TODAY    What additional details did the patient provide when requesting the medication: OUT    Does the patient have less than a 3 day supply:  [x] Yes  [] No    What is the patient's preferred pharmacy: [facility] - [address] [phone] [facility] [phone]

## 2021-04-29 RX ORDER — VERAPAMIL HYDROCHLORIDE 240 MG/1
240 TABLET, FILM COATED, EXTENDED RELEASE ORAL DAILY
Qty: 90 TABLET | Refills: 1 | Status: SHIPPED | OUTPATIENT
Start: 2021-04-29 | End: 2021-05-29

## 2021-04-29 RX ORDER — WARFARIN SODIUM 6 MG/1
6 TABLET ORAL DAILY
Qty: 90 TABLET | Refills: 1 | Status: SHIPPED | OUTPATIENT
Start: 2021-04-29 | End: 2021-08-11

## 2021-04-29 RX ORDER — VERAPAMIL HYDROCHLORIDE 240 MG/1
240 TABLET, FILM COATED, EXTENDED RELEASE ORAL DAILY
Qty: 90 TABLET | Refills: 1 | Status: SHIPPED | OUTPATIENT
Start: 2021-04-29 | End: 2021-04-29 | Stop reason: SDUPTHER

## 2021-05-24 ENCOUNTER — LAB (OUTPATIENT)
Dept: FAMILY MEDICINE CLINIC | Facility: CLINIC | Age: 76
End: 2021-05-24

## 2021-05-24 DIAGNOSIS — Z79.01 LONG TERM (CURRENT) USE OF ANTICOAGULANTS: ICD-10-CM

## 2021-05-24 LAB
INR PPP: 1.35 (ref 2–3)
PROTHROMBIN TIME: 14.6 SECONDS (ref 19.4–28.5)

## 2021-05-24 PROCEDURE — 85610 PROTHROMBIN TIME: CPT | Performed by: FAMILY MEDICINE

## 2021-05-24 PROCEDURE — 36415 COLL VENOUS BLD VENIPUNCTURE: CPT

## 2021-05-29 RX ORDER — VERAPAMIL HYDROCHLORIDE 240 MG/1
240 TABLET, FILM COATED, EXTENDED RELEASE ORAL DAILY
Qty: 90 TABLET | Refills: 1 | Status: SHIPPED | OUTPATIENT
Start: 2021-05-29 | End: 2021-06-04 | Stop reason: SDUPTHER

## 2021-06-04 RX ORDER — VERAPAMIL HYDROCHLORIDE 240 MG/1
240 TABLET, FILM COATED, EXTENDED RELEASE ORAL DAILY
Qty: 90 TABLET | Refills: 1 | Status: SHIPPED | OUTPATIENT
Start: 2021-06-04 | End: 2021-06-04 | Stop reason: SDUPTHER

## 2021-06-04 RX ORDER — VERAPAMIL HYDROCHLORIDE 240 MG/1
240 TABLET, FILM COATED, EXTENDED RELEASE ORAL DAILY
Qty: 90 TABLET | Refills: 1 | Status: SHIPPED | OUTPATIENT
Start: 2021-06-04 | End: 2021-06-16 | Stop reason: SDUPTHER

## 2021-06-17 RX ORDER — VERAPAMIL HYDROCHLORIDE 240 MG/1
240 TABLET, FILM COATED, EXTENDED RELEASE ORAL DAILY
Qty: 90 TABLET | Refills: 1 | Status: SHIPPED | OUTPATIENT
Start: 2021-06-17 | End: 2021-06-17 | Stop reason: SDUPTHER

## 2021-06-17 RX ORDER — VERAPAMIL HYDROCHLORIDE 240 MG/1
240 TABLET, FILM COATED, EXTENDED RELEASE ORAL DAILY
Qty: 90 TABLET | Refills: 3 | Status: SHIPPED | OUTPATIENT
Start: 2021-06-17 | End: 2021-06-18 | Stop reason: SDUPTHER

## 2021-06-18 RX ORDER — VERAPAMIL HYDROCHLORIDE 240 MG/1
240 TABLET, FILM COATED, EXTENDED RELEASE ORAL DAILY
Qty: 90 TABLET | Refills: 3 | Status: SHIPPED | OUTPATIENT
Start: 2021-06-18 | End: 2021-06-22 | Stop reason: SDUPTHER

## 2021-06-18 RX ORDER — VERAPAMIL HYDROCHLORIDE 240 MG/1
240 TABLET, FILM COATED, EXTENDED RELEASE ORAL DAILY
Qty: 90 TABLET | Refills: 3 | Status: SHIPPED | OUTPATIENT
Start: 2021-06-18 | End: 2021-06-18 | Stop reason: SDUPTHER

## 2021-06-18 NOTE — TELEPHONE ENCOUNTER
Caller: Arin Singh    Relationship: Self    Best call back number: [phone]    Medication needed:   Requested Prescriptions     Pending Prescriptions Disp Refills   • verapamil SR (CALAN-SR) 240 MG CR tablet 90 tablet 3     Sig: Take 1 tablet by mouth Daily.       When do you need the refill by: 6/19/21  What additional details did the patient provide when requesting the medication: WILL NEED TO GO TO MAIL ORDER, TRANSMISSION FAILED     Does the patient have less than a 3 day supply:  [] Yes  [x] No    What is the patient's preferred pharmacy: [facility] - [address], [address] - [phone]  - [phone] FX

## 2021-06-21 ENCOUNTER — LAB (OUTPATIENT)
Dept: FAMILY MEDICINE CLINIC | Facility: CLINIC | Age: 76
End: 2021-06-21

## 2021-06-21 DIAGNOSIS — Z79.01 LONG TERM (CURRENT) USE OF ANTICOAGULANTS: ICD-10-CM

## 2021-06-21 LAB
INR PPP: 3.17 (ref 2–3)
PROTHROMBIN TIME: 32.3 SECONDS (ref 19.4–28.5)

## 2021-06-21 PROCEDURE — 85610 PROTHROMBIN TIME: CPT | Performed by: FAMILY MEDICINE

## 2021-06-21 PROCEDURE — 36415 COLL VENOUS BLD VENIPUNCTURE: CPT

## 2021-06-22 ENCOUNTER — TELEPHONE (OUTPATIENT)
Dept: FAMILY MEDICINE CLINIC | Facility: CLINIC | Age: 76
End: 2021-06-22

## 2021-06-22 RX ORDER — VERAPAMIL HYDROCHLORIDE 240 MG/1
240 TABLET, FILM COATED, EXTENDED RELEASE ORAL DAILY
Qty: 90 TABLET | Refills: 3 | Status: SHIPPED | OUTPATIENT
Start: 2021-06-22 | End: 2021-06-24 | Stop reason: SDUPTHER

## 2021-06-22 NOTE — TELEPHONE ENCOUNTER
Pharmacy Name: [facility] - [address], [address] - [phone]  - [phone] FX     What medication are you calling in regards to: verapamil SR (CALAN-SR) 240 MG CR tablet    What question does the pharmacy have: DID NOT RECEIVE SCRIPT THAT WAS SENT ON 6/18    PATIENT ONLY HAS 7 DAYS LEFT AND NEEDS TO BE MAILED.

## 2021-06-24 RX ORDER — VERAPAMIL HYDROCHLORIDE 240 MG/1
240 TABLET, FILM COATED, EXTENDED RELEASE ORAL DAILY
Qty: 90 TABLET | Refills: 3 | Status: SHIPPED | OUTPATIENT
Start: 2021-06-24 | End: 2021-06-29 | Stop reason: SDUPTHER

## 2021-06-24 NOTE — TELEPHONE ENCOUNTER
Caller: Arin Singh    Relationship: Self    Best call back number: [phone]     Medication needed:   Requested Prescriptions     Pending Prescriptions Disp Refills   • verapamil SR (CALAN-SR) 240 MG CR tablet 90 tablet 3     Sig: Take 1 tablet by mouth Daily.       When do you need the refill by: 6/26/21    What additional details did the patient provide when requesting the medication: WILL NEED 30 DAY PRESCRIPTION CALLED IN TILL SHE CAN GET HER MAIL ORDER TO BE ORDERED     Does the patient have less than a 3 day supply:  [] Yes  [x] No    What is the patient's preferred pharmacy: [id number] - [place] - [address] - [phone] [facility] [phone] FX            Yes

## 2021-06-24 NOTE — TELEPHONE ENCOUNTER
Caller: SamanthaArin boland    Relationship: Self    Best call back number: [phone]     Medication needed:   Requested Prescriptions     Pending Prescriptions Disp Refills   • verapamil SR (CALAN-SR) 240 MG CR tablet 90 tablet 3     Sig: Take 1 tablet by mouth Daily.       When do you need the refill by: 6/28/21    What additional details did the patient provide when requesting the medication: SHE WILL NEED THIS CALLED IN AGAIN. [facility] STATED THEY HAVE NOT RECEIVED ANY PRESCRIPTIONS FOR THIS FROM DR COUGHLIN    Does the patient have less than a 3 day supply:  [] Yes  [x] No    What is the patient's preferred pharmacy: [facility] - [address], [facility] [address] - [phone]  - [phone] FX

## 2021-06-29 NOTE — TELEPHONE ENCOUNTER
Caller: Arin Fitzpatrick    Relationship: Self    Best call back number: [phone]    What is the best time to reach you: ANYTIME    Who are you requesting to speak with (clinical staff, provider,  specific staff member): CLINICAL STAFF    Do you know the name of the person who called: ARIN FITZPATRICK    What was the call regarding: PATIENT SAID THAT [facility] HAS STILL NOT RECEIVED HER REFILL OF VERAPAMIL, SHE SAID THAT THE [facility]'S PHARMACY DID NOT RECEIVE ONE EITHER, BUT THEY HAD HER PRESCRIPTION ON FILE, SO THEY WENT AHEAD AND FILLED IT    SHE IS WANTING TO SEE IF SHE NEEDS TO GET A PAPER PRESCRIPTION AND SEND IT INTO [facility]     Do you require a callback: YES

## 2021-06-30 ENCOUNTER — TELEPHONE (OUTPATIENT)
Dept: FAMILY MEDICINE CLINIC | Facility: CLINIC | Age: 76
End: 2021-06-30

## 2021-06-30 RX ORDER — VERAPAMIL HYDROCHLORIDE 240 MG/1
240 TABLET, FILM COATED, EXTENDED RELEASE ORAL DAILY
Qty: 90 TABLET | Refills: 3 | Status: SHIPPED | OUTPATIENT
Start: 2021-06-30 | End: 2021-07-23 | Stop reason: SDUPTHER

## 2021-06-30 NOTE — TELEPHONE ENCOUNTER
Lets do the paper script to send in- I have sent this electronically over 10 times and always fails

## 2021-06-30 NOTE — TELEPHONE ENCOUNTER
----- Message from Annelise Garcia CMA sent at 6/30/2021  1:20 PM EDT -----  Regarding: FW: Prescription Question  Contact: [phone]    ----- Message -----  From: Arin Singh  Sent: 6/30/2021  12:42 PM EDT  To: Albin Guillen MD  Subject: Prescription Question                            Can you verify Rx for Verapamil was successfully faxed to [facility]  Previous transmissions failed according to your office. I have been trying to make this happen for the last month. Need a solution to this problem. Thanks Arin Singh 1945 phone [phone]

## 2021-07-22 PROCEDURE — 93294 REM INTERROG EVL PM/LDLS PM: CPT | Performed by: NURSE PRACTITIONER

## 2021-07-22 PROCEDURE — 93296 REM INTERROG EVL PM/IDS: CPT | Performed by: NURSE PRACTITIONER

## 2021-07-22 NOTE — TELEPHONE ENCOUNTER
"Pharmacy Name: [facility] - [place], [place] - [address], [address] - [phone]  - [phone]        Pharmacy representative phone number: [phone]  REF #[id number]    What medication are you calling in regards to: verapamil SR (CALAN-SR) 240 MG CR tablet    What question does the pharmacy have: REQUESTED A VERBAL FOR AN OKAY TO FILL. HAS IN NOTES THAT IT IS A \"FAILED MEDICATION\"    "

## 2021-07-23 ENCOUNTER — TELEPHONE (OUTPATIENT)
Dept: FAMILY MEDICINE CLINIC | Facility: CLINIC | Age: 76
End: 2021-07-23

## 2021-07-23 RX ORDER — VERAPAMIL HYDROCHLORIDE 240 MG/1
240 TABLET, FILM COATED, EXTENDED RELEASE ORAL DAILY
Qty: 90 TABLET | Refills: 3 | Status: SHIPPED | OUTPATIENT
Start: 2021-07-23 | End: 2022-07-01

## 2021-07-23 NOTE — TELEPHONE ENCOUNTER
Pharmacy Name:  [facility]     Pharmacy representative name: TITUS     Pharmacy representative phone number: [phone] REF# [id number]    What medication are you calling in regards to:verapamil SR (CALAN-SR) 240 MG CR tablet                        What question does the pharmacy have: PHARMACY RECEIVED A FAX FAILED  FOR   REFILL REQUEST, THEY ARE NEEDING PRESCRIPTION  RESENT, WITH INSTRUCTION, QUANTITY.     Who is the provider that prescribed the medication: DR. COUGHLIN     Additional notes:   [facility]   (f) [phone]

## 2021-07-26 ENCOUNTER — LAB (OUTPATIENT)
Dept: FAMILY MEDICINE CLINIC | Facility: CLINIC | Age: 76
End: 2021-07-26

## 2021-07-26 DIAGNOSIS — Z79.01 LONG TERM (CURRENT) USE OF ANTICOAGULANTS: ICD-10-CM

## 2021-07-26 LAB
INR PPP: 2.6 (ref 2–3)
PROTHROMBIN TIME: 26.9 SECONDS (ref 19.4–28.5)

## 2021-07-26 PROCEDURE — 85610 PROTHROMBIN TIME: CPT | Performed by: FAMILY MEDICINE

## 2021-07-26 PROCEDURE — 36415 COLL VENOUS BLD VENIPUNCTURE: CPT

## 2021-08-11 RX ORDER — LISINOPRIL AND HYDROCHLOROTHIAZIDE 20; 12.5 MG/1; MG/1
1 TABLET ORAL DAILY
Qty: 90 TABLET | Refills: 1 | Status: SHIPPED | OUTPATIENT
Start: 2021-08-11 | End: 2021-12-13 | Stop reason: SDUPTHER

## 2021-08-11 RX ORDER — WARFARIN SODIUM 6 MG/1
6 TABLET ORAL DAILY
Qty: 90 TABLET | Refills: 3 | Status: SHIPPED | OUTPATIENT
Start: 2021-08-11 | End: 2022-07-28

## 2021-08-11 RX ORDER — METOPROLOL SUCCINATE 25 MG/1
TABLET, EXTENDED RELEASE ORAL
Qty: 180 TABLET | Refills: 1 | Status: SHIPPED | OUTPATIENT
Start: 2021-08-11 | End: 2021-12-13 | Stop reason: SDUPTHER

## 2021-08-23 ENCOUNTER — LAB (OUTPATIENT)
Dept: FAMILY MEDICINE CLINIC | Facility: CLINIC | Age: 76
End: 2021-08-23

## 2021-08-23 DIAGNOSIS — Z79.01 LONG TERM (CURRENT) USE OF ANTICOAGULANTS: ICD-10-CM

## 2021-08-23 LAB
INR PPP: 2.76 (ref 2–3)
PROTHROMBIN TIME: 28.4 SECONDS (ref 19.4–28.5)

## 2021-08-23 PROCEDURE — 36415 COLL VENOUS BLD VENIPUNCTURE: CPT

## 2021-08-23 PROCEDURE — 85610 PROTHROMBIN TIME: CPT | Performed by: FAMILY MEDICINE

## 2021-09-08 ENCOUNTER — TELEPHONE (OUTPATIENT)
Dept: CARDIOLOGY | Facility: CLINIC | Age: 76
End: 2021-09-08

## 2021-09-08 NOTE — TELEPHONE ENCOUNTER
Remote device transmission reviewed.  A. fib burden has increased with total time in A. fib estimated to be 18%.    We will move up follow-up to office.

## 2021-09-20 ENCOUNTER — OFFICE VISIT (OUTPATIENT)
Dept: FAMILY MEDICINE CLINIC | Facility: CLINIC | Age: 76
End: 2021-09-20

## 2021-09-20 ENCOUNTER — LAB (OUTPATIENT)
Dept: FAMILY MEDICINE CLINIC | Facility: CLINIC | Age: 76
End: 2021-09-20

## 2021-09-20 VITALS
OXYGEN SATURATION: 94 % | SYSTOLIC BLOOD PRESSURE: 137 MMHG | HEART RATE: 71 BPM | BODY MASS INDEX: 45.99 KG/M2 | WEIGHT: 293 LBS | DIASTOLIC BLOOD PRESSURE: 78 MMHG | HEIGHT: 67 IN | RESPIRATION RATE: 16 BRPM

## 2021-09-20 DIAGNOSIS — M54.50 ACUTE RIGHT-SIDED LOW BACK PAIN WITHOUT SCIATICA: Primary | ICD-10-CM

## 2021-09-20 DIAGNOSIS — Z79.01 LONG TERM (CURRENT) USE OF ANTICOAGULANTS: ICD-10-CM

## 2021-09-20 LAB
INR PPP: 2.41 (ref 2–3)
PROTHROMBIN TIME: 25.1 SECONDS (ref 19.4–28.5)

## 2021-09-20 PROCEDURE — 99213 OFFICE O/P EST LOW 20 MIN: CPT | Performed by: NURSE PRACTITIONER

## 2021-09-20 PROCEDURE — 85610 PROTHROMBIN TIME: CPT | Performed by: FAMILY MEDICINE

## 2021-09-20 PROCEDURE — 36415 COLL VENOUS BLD VENIPUNCTURE: CPT

## 2021-09-20 NOTE — PATIENT INSTRUCTIONS
Acute Back Pain, Adult  Acute back pain is sudden and usually short-lived. It is often caused by an injury to the muscles and tissues in the back. The injury may result from:  · A muscle or ligament getting overstretched or torn (strained). Ligaments are tissues that connect bones to each other. Lifting something improperly can cause a back strain.  · Wear and tear (degeneration) of the spinal disks. Spinal disks are circular tissue that provide cushioning between the bones of the spine (vertebrae).  · Twisting motions, such as while playing sports or doing yard work.  · A hit to the back.  · Arthritis.  You may have a physical exam, lab tests, and imaging tests to find the cause of your pain. Acute back pain usually goes away with rest and home care.  Follow these instructions at home:  Managing pain, stiffness, and swelling  · Treatment may include medicines for pain and inflammation that are taken by mouth or applied to the skin, prescription pain medicine, or muscle relaxants. Take over-the-counter and prescription medicines only as told by your health care provider.  · Your health care provider may recommend applying ice during the first 24-48 hours after your pain starts. To do this:  ? Put ice in a plastic bag.  ? Place a towel between your skin and the bag.  ? Leave the ice on for 20 minutes, 2-3 times a day.  · If directed, apply heat to the affected area as often as told by your health care provider. Use the heat source that your health care provider recommends, such as a moist heat pack or a heating pad.  ? Place a towel between your skin and the heat source.  ? Leave the heat on for 20-30 minutes.  ? Remove the heat if your skin turns bright red. This is especially important if you are unable to feel pain, heat, or cold. You have a greater risk of getting burned.  Activity    · Do not stay in bed. Staying in bed for more than 1-2 days can delay your recovery.  · Sit up and stand up straight. Avoid  leaning forward when you sit or hunching over when you stand.  ? If you work at a desk, sit close to it so you do not need to lean over. Keep your chin tucked in. Keep your neck drawn back, and keep your elbows bent at a 90-degree angle (right angle).  ? Sit high and close to the steering wheel when you drive. Add lower back (lumbar) support to your car seat, if needed.  · Take short walks on even surfaces as soon as you are able. Try to increase the length of time you walk each day.  · Do not sit, drive, or  one place for more than 30 minutes at a time. Sitting or standing for long periods of time can put stress on your back.  · Do not drive or use heavy machinery while taking prescription pain medicine.  · Use proper lifting techniques. When you bend and lift, use positions that put less stress on your back:  ? Bend your knees.  ? Keep the load close to your body.  ? Avoid twisting.  · Exercise regularly as told by your health care provider. Exercising helps your back heal faster and helps prevent back injuries by keeping muscles strong and flexible.  · Work with a physical therapist to make a safe exercise program, as recommended by your health care provider. Do any exercises as told by your physical therapist.    Lifestyle  · Maintain a healthy weight. Extra weight puts stress on your back and makes it difficult to have good posture.  · Avoid activities or situations that make you feel anxious or stressed. Stress and anxiety increase muscle tension and can make back pain worse. Learn ways to manage anxiety and stress, such as through exercise.  General instructions  · Sleep on a firm mattress in a comfortable position. Try lying on your side with your knees slightly bent. If you lie on your back, put a pillow under your knees.  · Follow your treatment plan as told by your health care provider. This may include:  ? Cognitive or behavioral therapy.  ? Acupuncture or massage therapy.  ? Meditation or  yoga.  Contact a health care provider if:  · You have pain that is not relieved with rest or medicine.  · You have increasing pain going down into your legs or buttocks.  · Your pain does not improve after 2 weeks.  · You have pain at night.  · You lose weight without trying.  · You have a fever or chills.  Get help right away if:  · You develop new bowel or bladder control problems.  · You have unusual weakness or numbness in your arms or legs.  · You develop nausea or vomiting.  · You develop abdominal pain.  · You feel faint.  Summary  · Acute back pain is sudden and usually short-lived.  · Use proper lifting techniques. When you bend and lift, use positions that put less stress on your back.  · Take over-the-counter and prescription medicines and apply heat or ice as directed by your health care provider.  This information is not intended to replace advice given to you by your health care provider. Make sure you discuss any questions you have with your health care provider.  Document Revised: 10/15/2020 Document Reviewed: 08/01/2018  Elsevier Patient Education © 2021 [employer]

## 2021-09-20 NOTE — PROGRESS NOTES
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    HENT: Negative.    Respiratory: Negative.    Gastrointestinal: Negative.    Genitourinary: Negative.    Musculoskeletal: Positive for back pain.   Neurological: Negative.        Objective   Vital Signs:   /78   Pulse 71   Resp 16   Ht 170.2 cm (67\")   Wt (!) 140 kg (308 lb)   SpO2 94%   BMI 48.24 kg/m²     Physical Exam  Constitutional:       Appearance: Normal appearance.   Cardiovascular:      Rate and Rhythm: Normal rate.      Heart sounds: Normal heart sounds.   Pulmonary:      Effort: Pulmonary effort is normal.      Breath sounds: Normal breath sounds.   Abdominal:      Tenderness: There is no right CVA tenderness or left CVA tenderness.   Musculoskeletal:      Lumbar back: Tenderness present.        Back:    Neurological:      Mental Status: She is alert.        Result Review :                 Assessment and Plan    Diagnoses and all orders for this visit:    1. Acute right-sided low back pain without sciatica (Primary)    can try topical pain relievers, advised to be careful with lifting, use proper body mechanics      Follow Up   Return if symptoms worsen or fail to improve.  Patient was given instructions and counseling regarding her condition or for " health maintenance advice. Please see specific information pulled into the AVS if appropriate.

## 2021-10-06 ENCOUNTER — OFFICE VISIT (OUTPATIENT)
Dept: CARDIOLOGY | Facility: CLINIC | Age: 76
End: 2021-10-06

## 2021-10-06 ENCOUNTER — CLINICAL SUPPORT NO REQUIREMENTS (OUTPATIENT)
Dept: CARDIOLOGY | Facility: CLINIC | Age: 76
End: 2021-10-06

## 2021-10-06 VITALS
DIASTOLIC BLOOD PRESSURE: 86 MMHG | HEART RATE: 71 BPM | HEIGHT: 67 IN | BODY MASS INDEX: 45.99 KG/M2 | RESPIRATION RATE: 18 BRPM | SYSTOLIC BLOOD PRESSURE: 132 MMHG | WEIGHT: 293 LBS

## 2021-10-06 DIAGNOSIS — Z95.0 PRESENCE OF CARDIAC PACEMAKER: ICD-10-CM

## 2021-10-06 DIAGNOSIS — I49.5 SICK SINUS SYNDROME (HCC): Primary | ICD-10-CM

## 2021-10-06 DIAGNOSIS — E78.2 HYPERLIPIDEMIA, MIXED: Primary | ICD-10-CM

## 2021-10-06 DIAGNOSIS — I10 ESSENTIAL HYPERTENSION: ICD-10-CM

## 2021-10-06 DIAGNOSIS — I48.0 PAROXYSMAL ATRIAL FIBRILLATION (HCC): ICD-10-CM

## 2021-10-06 PROCEDURE — 99204 OFFICE O/P NEW MOD 45 MIN: CPT | Performed by: INTERNAL MEDICINE

## 2021-10-06 PROCEDURE — 93280 PM DEVICE PROGR EVAL DUAL: CPT | Performed by: NURSE PRACTITIONER

## 2021-10-06 NOTE — PROGRESS NOTES
Cardiology clinic note  Hernesto Berger MD, PhD  Subjective:     Encounter Date:10/06/2021      Patient ID: Arin Singh is a 76 y.o. female.    Chief Complaint:  Chief Complaint   Patient presents with   • Follow-up   New patient encounter today    HPI:  History of Present Illness  I the pleasure to see this 76-year-old female today as a new patient.  She has a cardiac history of sick sinus syndrome status post pacemaker as well as tachybradycardia syndrome with paroxysmal atrial fibrillation on Coumadin therapy for thromboembolic disease with MTHFR gene mutation and again thromboembolic disease in the past remotely.  She was on anticoagulation when she first was diagnosed with atrial fibrillation.  She has no systolic heart failure with preserved LVEF, no chest pain or anginal symptoms.  She is not on antiarrhythmic therapy and is only on verapamil and metoprolol as rate and rhythm control strategy.  She does not wish to be on anything that requires hospitalization such as dofetilide, no wish for any ablation that would require hospitalization given Covid concerns.  Her device interrogation has 23% time in atrial fibrillation with RVR sometimes greater than 48 hours at a time.  As we discussed today there other antiarrhythmics did not acquire hospitalization and can minimize side effects such as Multaq which would like to try today and she is amenable for this approach.  She has no other complaints today.  She has controlled essential hypertension, stable Coumadin therapy with INR which we did discuss will need to be monitored with starting dronedarone.    Review of systems otherwise negative x14 point review of systems except was mentioned above    Historical data copied forward from previous encounters in EMR is unchanged    Exam  Regular rate rhythm no rubs gallops no heave no lift  Obese soft nontender nondistended bowel sounds are positive  Pacemaker site clean and dry  Clear to auscultation  No clubbing  cyanosis or edema  Pulses 2+ equal bilaterally    Assessment plan per my encounter  Sick sinus syndrome with presence of pacemaker  Paroxysmal A. fib RVR, uncontrolled  Essential hypertension    Interrogation reveals 23% of time A. fib which is uncontrolled with significant burden  Continue metoprolol and verapamil  Start dronedarone 400 twice daily  Recheck INR is good effect Coumadin dosing within the next 5 days  Recheck LFTs now and in 3 months  Multaq does not require thyroid function testing or PFTs  Resee back in 8 weeks for A. fib burden on interrogation  Continue other antihypertensives  Continue Coumadin but adjust dosing per levels for INR 2-3    Back to clinic in 8 weeks    Hernesto Berger MD, PhD      The following portions of the patient's history were reviewed and updated as appropriate: allergies, current medications, past family history, past medical history, past social history, past surgical history and problem list.    Problem List:  Patient Active Problem List   Diagnosis   • Sick sinus syndrome (HCC)   • Presence of cardiac pacemaker   • Absolute anemia   • Coagulation factor deficiency syndrome (HCC)   • Hyperlipidemia, mixed   • Essential hypertension   • Osteoarthritis of knee   • Paroxysmal atrial fibrillation (HCC)   • Vitamin B12 deficiency   • Vitamin D deficiency   • Warfarin therapy started   • Screening for breast cancer       Past Medical History:  Past Medical History:   Diagnosis Date   • Coagulation factor deficiency syndrome (HCC) 6/4/2013   • Hyperlipidemia 9/12/2019   • Hypertension 9/12/2019   • Osteoarthritis of knee 10/7/2015   • Other pulmonary embolism without acute cor pulmonale (HCC) 3/8/2013   • Paroxysmal atrial fibrillation (HCC) 10/4/2017   • Presence of cardiac pacemaker 9/9/2019   • Sick sinus syndrome (HCC) 9/9/2019   • Vitamin B12 deficiency 11/2/2017   • Vitamin D deficiency 9/25/2018       Past Surgical History:  Past Surgical History:   Procedure Laterality  "Date   • INSERT / REPLACE / REMOVE PACEMAKER  2016    BS AV Sequential   • REPLACEMENT TOTAL KNEE Right        Social History:  Social History     Socioeconomic History   • Marital status:      Spouse name: Not on file   • Number of children: Not on file   • Years of education: Not on file   • Highest education level: Not on file   Tobacco Use   • Smoking status: Never Smoker   • Smokeless tobacco: Never Used   Substance and Sexual Activity   • Alcohol use: No   • Drug use: No   • Sexual activity: Defer       Allergies:  No Known Allergies    Immunizations:  Immunization History   Administered Date(s) Administered   • COVID-19 (PFIZER) 01/22/2021, 02/12/2021   • Fluad Quad 65+ 09/25/2020   • Flucelvax Quad Vial =>4yrs 10/18/2019   • Fluzone High Dose =>65 Years ([employer] ONLY) 10/07/2015, 10/18/2016, 09/22/2017, 09/25/2018   • Influenza Whole 11/16/2005, 11/15/2006, 09/23/2009   • Influenza, Unspecified 10/18/2019   • Pneumococcal Conjugate 13-Valent (PCV13) 10/12/2015   • Pneumococcal Polysaccharide (PPSV23) 12/09/2020   • Zostavax 12/05/2014       ROS:  ROS       Objective:         /86 (BP Location: Left arm, Patient Position: Sitting)   Pulse 71   Resp 18   Ht 170.2 cm (67\")   Wt 136 kg (299 lb 9.6 oz)   BMI 46.92 kg/m²     Physical Exam    In-Office Procedure(s):  Procedures    ASCVD RIsk Score::  The 10-year ASCVD risk score (Shyanne JAMILA Mason., et al., 2013) is: 29.2%    Values used to calculate the score:      Age: 76 years      Sex: Female      Is Non- : No      Diabetic: No      Tobacco smoker: No      Systolic Blood Pressure: 132 mmHg      Is BP treated: Yes      HDL Cholesterol: 45 mg/dL      Total Cholesterol: 168 mg/dL    Recent Radiology:  Imaging Results (Most Recent)     None          Lab Review:   Lab on 09/20/2021   Component Date Value   • Protime 09/20/2021 25.1    • INR 09/20/2021 2.41    Lab on 08/23/2021   Component Date Value   • Protime 08/23/2021 28.4    • " INR 08/23/2021 2.76    Lab on 07/26/2021   Component Date Value   • Protime 07/26/2021 26.9    • INR 07/26/2021 2.60    Lab on 06/21/2021   Component Date Value   • Protime 06/21/2021 32.3*   • INR 06/21/2021 3.17*   Lab on 05/24/2021   Component Date Value   • Protime 05/24/2021 14.6*   • INR 05/24/2021 1.35*   Lab on 04/22/2021   Component Date Value   • Protime 04/22/2021 24.3    • INR 04/22/2021 2.30                 Assessment:          Diagnosis Plan   1. Hyperlipidemia, mixed  Lipid Panel   2. Essential hypertension  Comprehensive Metabolic Panel   3. Paroxysmal atrial fibrillation (HCC)  Ambulatory Referral to Sleep Medicine                Hernesto Berger MD  10/06/21  .

## 2021-10-07 ENCOUNTER — TELEPHONE (OUTPATIENT)
Dept: FAMILY MEDICINE CLINIC | Facility: CLINIC | Age: 76
End: 2021-10-07

## 2021-10-07 NOTE — TELEPHONE ENCOUNTER
Caller: Arin Singh    Relationship to patient: Self    Best call back number: [phone]    Chief complaint: SCHEDULE LABS FROM CARDIOLOGIST, DR. OLVERA     Type of visit: LAB    Requested date: AROUND 10/11/21    If rescheduling, when is the original appointment:N/A    Additional notes: PATIENT CALLED AND SAID THAT HER CARDIOLOGIST, DR. OLVERA HAD ORDERED SOME LABS FOR HER, A LIPID PANEL AND OTHERS     THE PATIENT SAID HE UPLOADED THEM TO HER CHART SO SHE COULD GET THEM DRAWN AT THE OFFICE IF POSSIBLE

## 2021-10-11 ENCOUNTER — CLINICAL SUPPORT (OUTPATIENT)
Dept: FAMILY MEDICINE CLINIC | Facility: CLINIC | Age: 76
End: 2021-10-11

## 2021-10-11 ENCOUNTER — LAB (OUTPATIENT)
Dept: FAMILY MEDICINE CLINIC | Facility: CLINIC | Age: 76
End: 2021-10-11

## 2021-10-11 DIAGNOSIS — E78.2 HYPERLIPIDEMIA, MIXED: ICD-10-CM

## 2021-10-11 DIAGNOSIS — Z79.01 LONG TERM (CURRENT) USE OF ANTICOAGULANTS: ICD-10-CM

## 2021-10-11 DIAGNOSIS — I10 ESSENTIAL HYPERTENSION: ICD-10-CM

## 2021-10-11 DIAGNOSIS — Z23 NEED FOR VACCINATION: Primary | ICD-10-CM

## 2021-10-11 LAB
ALBUMIN SERPL-MCNC: 4 G/DL (ref 3.5–5.2)
ALBUMIN/GLOB SERPL: 1.3 G/DL
ALP SERPL-CCNC: 80 U/L (ref 39–117)
ALT SERPL W P-5'-P-CCNC: 9 U/L (ref 1–33)
ANION GAP SERPL CALCULATED.3IONS-SCNC: 7.2 MMOL/L (ref 5–15)
AST SERPL-CCNC: 17 U/L (ref 1–32)
BILIRUB SERPL-MCNC: 0.5 MG/DL (ref 0–1.2)
BUN SERPL-MCNC: 20 MG/DL (ref 8–23)
BUN/CREAT SERPL: 14.5 (ref 7–25)
CALCIUM SPEC-SCNC: 9.3 MG/DL (ref 8.6–10.5)
CHLORIDE SERPL-SCNC: 105 MMOL/L (ref 98–107)
CHOLEST SERPL-MCNC: 179 MG/DL (ref 0–200)
CO2 SERPL-SCNC: 23.8 MMOL/L (ref 22–29)
CREAT SERPL-MCNC: 1.38 MG/DL (ref 0.57–1)
GFR SERPL CREATININE-BSD FRML MDRD: 37 ML/MIN/1.73
GLOBULIN UR ELPH-MCNC: 3.1 GM/DL
GLUCOSE SERPL-MCNC: 94 MG/DL (ref 65–99)
HDLC SERPL-MCNC: 48 MG/DL (ref 40–60)
INR PPP: 2.54 (ref 2–3)
LDLC SERPL CALC-MCNC: 115 MG/DL (ref 0–100)
LDLC/HDLC SERPL: 2.36 {RATIO}
POTASSIUM SERPL-SCNC: 4 MMOL/L (ref 3.5–5.2)
PROT SERPL-MCNC: 7.1 G/DL (ref 6–8.5)
PROTHROMBIN TIME: 26.3 SECONDS (ref 19.4–28.5)
SODIUM SERPL-SCNC: 136 MMOL/L (ref 136–145)
TRIGL SERPL-MCNC: 89 MG/DL (ref 0–150)
VLDLC SERPL-MCNC: 16 MG/DL (ref 5–40)

## 2021-10-11 PROCEDURE — 80061 LIPID PANEL: CPT | Performed by: INTERNAL MEDICINE

## 2021-10-11 PROCEDURE — 36415 COLL VENOUS BLD VENIPUNCTURE: CPT

## 2021-10-11 PROCEDURE — 85610 PROTHROMBIN TIME: CPT | Performed by: FAMILY MEDICINE

## 2021-10-11 PROCEDURE — 80053 COMPREHEN METABOLIC PANEL: CPT | Performed by: INTERNAL MEDICINE

## 2021-10-11 PROCEDURE — G0008 ADMIN INFLUENZA VIRUS VAC: HCPCS | Performed by: FAMILY MEDICINE

## 2021-10-11 PROCEDURE — 90662 IIV NO PRSV INCREASED AG IM: CPT | Performed by: FAMILY MEDICINE

## 2021-10-12 ENCOUNTER — PATIENT MESSAGE (OUTPATIENT)
Dept: CARDIOLOGY | Facility: CLINIC | Age: 76
End: 2021-10-12

## 2021-10-12 NOTE — TELEPHONE ENCOUNTER
From: Arin Singh  To: Hernesto Berger MD  Sent: 10/12/2021 12:37 PM EDT  Subject: Question regarding COMPREHENSIVE METABOLIC PANEL    Could elevated creatinine level be due to new drug, Multaq? Should I continue taking this drug? I have completed samples but not yet picked up prescription.

## 2021-10-18 NOTE — PROGRESS NOTES
DEVICE INTERROGATION:  IN OFFICE    DEVICE TYPE:   Dual-chamber pacemaker    :   [employer]    BATTERY:  Stable    TIME TO ELECTIVE REPLACEMENT INDICATORS:   2.5 years    CHARGE TIME:   Not applicable        LEAD DATA:       DEVICE REPROGRAMMED FOR TESTING      Atrial:   2.9 mV, 594 ohms, 0.9 V@0.4 ms    Ventricular:     12.7 mV, 628 ohms, 0.9 V@0.4 ms    LV:      Atrial pacing percentage: 45%    Ventricular pacing percentage: 76%      Arrhythmia Logbook Reviewed: A. fib burden 23% of the time        Summary:    Stable Device Function    No significant arhythmia burden.     Battery status is stable.    Reviewed with: Dr. Berger      NEXT IN OFFICE DEVICE CHECK DUE: At next visit    REMOTE DEVICE INTERROGATIONS: Ongoing

## 2021-10-21 PROCEDURE — 93294 REM INTERROG EVL PM/LDLS PM: CPT | Performed by: NURSE PRACTITIONER

## 2021-10-21 PROCEDURE — 93296 REM INTERROG EVL PM/IDS: CPT | Performed by: NURSE PRACTITIONER

## 2021-11-15 ENCOUNTER — LAB (OUTPATIENT)
Dept: FAMILY MEDICINE CLINIC | Facility: CLINIC | Age: 76
End: 2021-11-15

## 2021-11-15 DIAGNOSIS — Z79.01 LONG TERM (CURRENT) USE OF ANTICOAGULANTS: ICD-10-CM

## 2021-11-15 LAB
INR PPP: 2.02 (ref 2–3)
PROTHROMBIN TIME: 21.3 SECONDS (ref 19.4–28.5)

## 2021-11-15 PROCEDURE — 36415 COLL VENOUS BLD VENIPUNCTURE: CPT

## 2021-11-15 PROCEDURE — 85610 PROTHROMBIN TIME: CPT | Performed by: FAMILY MEDICINE

## 2021-12-08 ENCOUNTER — OFFICE VISIT (OUTPATIENT)
Dept: CARDIOLOGY | Facility: CLINIC | Age: 76
End: 2021-12-08

## 2021-12-08 ENCOUNTER — CLINICAL SUPPORT NO REQUIREMENTS (OUTPATIENT)
Dept: CARDIOLOGY | Facility: CLINIC | Age: 76
End: 2021-12-08

## 2021-12-08 VITALS
SYSTOLIC BLOOD PRESSURE: 128 MMHG | DIASTOLIC BLOOD PRESSURE: 80 MMHG | HEART RATE: 70 BPM | RESPIRATION RATE: 18 BRPM | BODY MASS INDEX: 45.99 KG/M2 | HEIGHT: 67 IN | WEIGHT: 293 LBS

## 2021-12-08 DIAGNOSIS — Z95.0 PRESENCE OF CARDIAC PACEMAKER: ICD-10-CM

## 2021-12-08 DIAGNOSIS — I49.5 SICK SINUS SYNDROME (HCC): Primary | ICD-10-CM

## 2021-12-08 DIAGNOSIS — I48.0 PAROXYSMAL ATRIAL FIBRILLATION (HCC): ICD-10-CM

## 2021-12-08 DIAGNOSIS — I10 ESSENTIAL HYPERTENSION: ICD-10-CM

## 2021-12-08 PROCEDURE — 93280 PM DEVICE PROGR EVAL DUAL: CPT | Performed by: NURSE PRACTITIONER

## 2021-12-08 PROCEDURE — 99214 OFFICE O/P EST MOD 30 MIN: CPT | Performed by: INTERNAL MEDICINE

## 2021-12-08 NOTE — PROGRESS NOTES
Cardiology Clinic Note  Hernesto Berger MD, PhD    Subjective:     Encounter Date:12/08/2021      Patient ID: Arin Singh is a 76 y.o. female.    Chief Complaint:  Chief Complaint   Patient presents with   • Follow-up       HPI:    I the pleasure to see this 76-year-old female today as a new patient.  She has a cardiac history of sick sinus syndrome status post pacemaker as well as tachybradycardia syndrome with paroxysmal atrial fibrillation on Coumadin therapy for thromboembolic disease with MTHFR gene mutation and again thromboembolic disease in the past remotely.  She was on anticoagulation when she first was diagnosed with atrial fibrillation.  She has no systolic heart failure with preserved LVEF, no chest pain or anginal symptoms.  She is not on antiarrhythmic therapy and is only on verapamil and metoprolol as rate and rhythm control strategy.  She does not wish to be on anything that requires hospitalization such as dofetilide, no wish for any ablation that would require hospitalization given Covid concerns.  Her device interrogation has 23% time in atrial fibrillation with RVR sometimes greater than 48 hours at a time.  As we discussed today there other antiarrhythmics did not acquire hospitalization and can minimize side effects such as Multaq which would like to try today and she is amenable for this approach.  She has no other complaints today.  She has controlled essential hypertension, stable Coumadin therapy with INR which we did discuss will need to be monitored with starting dronedarone.    She presents back for follow-up.  Device interrogation today reveals much improved burden of atrial fibrillation, she still remains on Coumadin with no bleeding.  She is pending LFTs and routine labs.  Otherwise she feels as good as she has in quite some time.     Review of systems otherwise negative x14 point review of systems except was mentioned above     Historical data copied forward from previous  "encounters in EMR is unchanged     Exam  Regular rate rhythm no rubs gallops no heave no lift  Obese soft nontender nondistended bowel sounds are positive  Pacemaker site clean and dry  Clear to auscultation  No clubbing cyanosis or edema  Pulses 2+ equal bilaterally  Unchanged from prior    Assessment plan per my encounter  Sick sinus syndrome with presence of pacemaker  Paroxysmal A. fib RVR, better controlled on Multaq  Essential hypertension     Interrogation reveals 23% of time A. fib which is uncontrolled with significant burden previously, she is started Multaq with much improved burden with only 7 limited paroxysmal events  Continue Multaq 400 twice daily  Continue metoprolol and verapamil  LFTs are pending  Recheck INR is good effect Coumadin dosing within the next 5 days  Multaq does not require thyroid function testing or PFTs  Resee back in 6 months s for A. fib burden on interrogation  Continue other antihypertensives  Continue Coumadin but adjust dosing per levels for INR 2-3     Back to clinic in 6 months     Hernesto Berger MD, PhD     Medicines reviewed      Historical data copied forward from previous encounters in EMR including the history, exam, and assessment/plan has been reviewed and is unchanged unless noted otherwise.    Cardiac medicines reviewed with risk, benefits, and necessity of each discussed.    Risk and benefit of cardiac testing reviewed including death heart attack stroke pain bleeding infection need for vascular /cardiovascular surgery were discussed and the patient     Objective:         /80 (BP Location: Left arm, Patient Position: Sitting)   Pulse 70   Resp 18   Ht 170.2 cm (67\")   Wt 134 kg (295 lb 3.2 oz)   BMI 46.23 kg/m²     Physical Exam    Assessment:         There are no diagnoses linked to this encounter.       Plan:              The pleasure to be involved in this patient's cardiovascular care.  Please call with any questions or concerns  Hernesto Berger MD, " PhD    Most recent EKG as reviewed and interpreted by me:  Procedures     Most recent echo as reviewed and interpreted by me:      Most recent stress test as reviewed and interpreted by me:      Most recent cardiac catheterization as reviewed interpreted by me:  No results found for this or any previous visit.    The following portions of the patient's history were reviewed and updated as appropriate: allergies, current medications, past family history, past medical history, past social history, past surgical history and problem list.      ROS:  14 point review of systems negative except as mentioned above    Current Outpatient Medications:   •  Diclofenac Sodium (VOLTAREN) 1 % gel gel, Apply 4 g topically to the appropriate area as directed 4 (Four) Times a Day As Needed (joint pain)., Disp: 100 g, Rfl: 5  •  dronedarone (Multaq) 400 MG tablet, Take 1 tablet by mouth 2 (Two) Times a Day With Meals., Disp: 60 tablet, Rfl: 3  •  lisinopril-hydrochlorothiazide (PRINZIDE,ZESTORETIC) 20-12.5 MG per tablet, TAKE 1 TABLET BY MOUTH  DAILY, Disp: 90 tablet, Rfl: 1  •  metoprolol succinate XL (TOPROL-XL) 25 MG 24 hr tablet, TAKE 1 TABLET BY MOUTH  TWICE DAILY, Disp: 180 tablet, Rfl: 1  •  verapamil SR (CALAN-SR) 240 MG CR tablet, Take 1 tablet by mouth Daily., Disp: 90 tablet, Rfl: 3  •  warfarin (COUMADIN) 6 MG tablet, TAKE 1 TABLET BY MOUTH  DAILY AS DIRECTED, Disp: 90 tablet, Rfl: 3  •  warfarin (COUMADIN) 7.5 MG tablet, Take 1 tablet by mouth Daily. as directed, Disp: 90 tablet, Rfl: 1    Problem List:  Patient Active Problem List   Diagnosis   • Sick sinus syndrome (HCC)   • Presence of cardiac pacemaker   • Absolute anemia   • Coagulation factor deficiency syndrome (HCC)   • Hyperlipidemia, mixed   • Essential hypertension   • Osteoarthritis of knee   • Paroxysmal atrial fibrillation (HCC)   • Vitamin B12 deficiency   • Vitamin D deficiency   • Warfarin therapy started   • Screening for breast cancer     Past Medical  History:  Past Medical History:   Diagnosis Date   • Coagulation factor deficiency syndrome (HCC) 6/4/2013   • Hyperlipidemia 9/12/2019   • Hypertension 9/12/2019   • Osteoarthritis of knee 10/7/2015   • Other pulmonary embolism without acute cor pulmonale (HCC) 3/8/2013   • Paroxysmal atrial fibrillation (HCC) 10/4/2017   • Presence of cardiac pacemaker 9/9/2019   • Sick sinus syndrome (HCC) 9/9/2019   • Vitamin B12 deficiency 11/2/2017   • Vitamin D deficiency 9/25/2018     Past Surgical History:  Past Surgical History:   Procedure Laterality Date   • INSERT / REPLACE / REMOVE PACEMAKER  2016    BS AV Sequential   • REPLACEMENT TOTAL KNEE Right      Social History:  Social History     Socioeconomic History   • Marital status:    Tobacco Use   • Smoking status: Never Smoker   • Smokeless tobacco: Never Used   Substance and Sexual Activity   • Alcohol use: No   • Drug use: No   • Sexual activity: Defer     Allergies:  No Known Allergies  Immunizations:  Immunization History   Administered Date(s) Administered   • COVID-19 (PFIZER) 01/22/2021, 02/12/2021   • Fluad Quad 65+ 09/25/2020   • Flucelvax Quad Vial =>4yrs 10/18/2019   • Fluzone High Dose =>65 Years ([facility] ONLY) 10/07/2015, 10/18/2016, 09/22/2017, 09/25/2018   • Fluzone High-Dose 65+yrs 10/11/2021   • Influenza Whole 11/16/2005, 11/15/2006, 09/23/2009   • Influenza, Unspecified 10/18/2019   • Pneumococcal Conjugate 13-Valent (PCV13) 10/12/2015   • Pneumococcal Polysaccharide (PPSV23) 12/09/2020   • Zostavax 12/05/2014            In-Office Procedure(s):  No orders to display        ASCVD RIsk Score::  The 10-year ASCVD risk score (Shyanne MARINO Jr., et al., 2013) is: 27.9%    Values used to calculate the score:      Age: 76 years      Sex: Female      Is Non- : No      Diabetic: No      Tobacco smoker: No      Systolic Blood Pressure: 128 mmHg      Is BP treated: Yes      HDL Cholesterol: 48 mg/dL      Total Cholesterol: 179  mg/dL    Imaging:                 Lab Review:   Lab on 11/15/2021   Component Date Value   • Protime 11/15/2021 21.3    • INR 11/15/2021 2.02    Lab on 10/11/2021   Component Date Value   • Protime 10/11/2021 26.3    • INR 10/11/2021 2.54    • Glucose 10/11/2021 94    • BUN 10/11/2021 20    • Creatinine 10/11/2021 1.38*   • Sodium 10/11/2021 136    • Potassium 10/11/2021 4.0    • Chloride 10/11/2021 105    • CO2 10/11/2021 23.8    • Calcium 10/11/2021 9.3    • Total Protein 10/11/2021 7.1    • Albumin 10/11/2021 4.00    • ALT (SGPT) 10/11/2021 9    • AST (SGOT) 10/11/2021 17    • Alkaline Phosphatase 10/11/2021 80    • Total Bilirubin 10/11/2021 0.5    • eGFR Non African Amer 10/11/2021 37*   • Globulin 10/11/2021 3.1    • A/G Ratio 10/11/2021 1.3    • BUN/Creatinine Ratio 10/11/2021 14.5    • Anion Gap 10/11/2021 7.2    • Total Cholesterol 10/11/2021 179    • Triglycerides 10/11/2021 89    • HDL Cholesterol 10/11/2021 48    • LDL Cholesterol  10/11/2021 115*   • VLDL Cholesterol 10/11/2021 16    • LDL/HDL Ratio 10/11/2021 2.36    Lab on 09/20/2021   Component Date Value   • Protime 09/20/2021 25.1    • INR 09/20/2021 2.41    Lab on 08/23/2021   Component Date Value   • Protime 08/23/2021 28.4    • INR 08/23/2021 2.76    Lab on 07/26/2021   Component Date Value   • Protime 07/26/2021 26.9    • INR 07/26/2021 2.60    Lab on 06/21/2021   Component Date Value   • Protime 06/21/2021 32.3*   • INR 06/21/2021 3.17*     Recent labs reviewed and interpreted for clinical significance and application            Level of Care:           Hernesto Berger MD  12/08/21  .

## 2021-12-10 NOTE — PROGRESS NOTES
DEVICE INTERROGATION:  IN OFFICE    DEVICE TYPE:   Dual-chamber pacemaker    :   [employer]ak[employer]    BATTERY:  Stable    TIME TO ELECTIVE REPLACEMENT INDICATORS:   2.5 years    CHARGE TIME:   Not applicable        LEAD DATA:       DEVICE REPROGRAMMED FOR TESTING      Atrial:   8.4 mV, 592 ohms, 1.0 V@0.4 ms    Ventricular:     10.9 mV, 633 ohms, 0.9 V@0.4 ms    LV:      Atrial pacing percentage: 69%    Ventricular pacing percentage: 88%      Arrhythmia Logbook Reviewed: Patient and very brief recurrent atrial fibrillation.  Her overall percentage of A. fib burden has decreased from 24 to 7% of the time.        Summary:    Stable Device Function    Continues to have paroxysmal atrial fibrillation    Battery status is stable.    Reviewed with: Dr. Berger      NEXT IN OFFICE DEVICE CHECK DUE: As scheduled    REMOTE DEVICE INTERROGATIONS: Ongoing

## 2021-12-13 RX ORDER — METOPROLOL SUCCINATE 25 MG/1
25 TABLET, EXTENDED RELEASE ORAL 2 TIMES DAILY
Qty: 180 TABLET | Refills: 1 | Status: SHIPPED | OUTPATIENT
Start: 2021-12-13 | End: 2022-04-14

## 2021-12-13 RX ORDER — LISINOPRIL AND HYDROCHLOROTHIAZIDE 20; 12.5 MG/1; MG/1
1 TABLET ORAL DAILY
Qty: 90 TABLET | Refills: 1 | Status: SHIPPED | OUTPATIENT
Start: 2021-12-13 | End: 2022-06-15

## 2021-12-14 ENCOUNTER — LAB (OUTPATIENT)
Dept: FAMILY MEDICINE CLINIC | Facility: CLINIC | Age: 76
End: 2021-12-14

## 2021-12-14 ENCOUNTER — OFFICE VISIT (OUTPATIENT)
Dept: FAMILY MEDICINE CLINIC | Facility: CLINIC | Age: 76
End: 2021-12-14

## 2021-12-14 VITALS
DIASTOLIC BLOOD PRESSURE: 72 MMHG | RESPIRATION RATE: 16 BRPM | BODY MASS INDEX: 45.99 KG/M2 | SYSTOLIC BLOOD PRESSURE: 120 MMHG | TEMPERATURE: 98.7 F | WEIGHT: 293 LBS | HEIGHT: 67 IN | OXYGEN SATURATION: 96 % | HEART RATE: 76 BPM

## 2021-12-14 DIAGNOSIS — M17.0 OSTEOARTHRITIS OF BOTH KNEES, UNSPECIFIED OSTEOARTHRITIS TYPE: ICD-10-CM

## 2021-12-14 DIAGNOSIS — I48.0 PAROXYSMAL ATRIAL FIBRILLATION (HCC): ICD-10-CM

## 2021-12-14 DIAGNOSIS — Z12.31 ENCOUNTER FOR SCREENING MAMMOGRAM FOR MALIGNANT NEOPLASM OF BREAST: ICD-10-CM

## 2021-12-14 DIAGNOSIS — I10 ESSENTIAL HYPERTENSION: ICD-10-CM

## 2021-12-14 DIAGNOSIS — R93.7 ABNORMAL FINDINGS ON DIAGNOSTIC IMAGING OF OTHER PARTS OF MUSCULOSKELETAL SYSTEM: ICD-10-CM

## 2021-12-14 DIAGNOSIS — E55.9 VITAMIN D DEFICIENCY, UNSPECIFIED: ICD-10-CM

## 2021-12-14 DIAGNOSIS — Z00.00 MEDICARE ANNUAL WELLNESS VISIT, SUBSEQUENT: Primary | ICD-10-CM

## 2021-12-14 LAB
25(OH)D3 SERPL-MCNC: 46.1 NG/ML
ALBUMIN SERPL-MCNC: 4 G/DL (ref 3.5–5.2)
ALBUMIN/GLOB SERPL: 1.1 G/DL
ALP SERPL-CCNC: 85 U/L (ref 39–117)
ALT SERPL W P-5'-P-CCNC: 10 U/L (ref 1–33)
ANION GAP SERPL CALCULATED.3IONS-SCNC: 9 MMOL/L (ref 5–15)
AST SERPL-CCNC: 17 U/L (ref 1–32)
BASOPHILS # BLD AUTO: 0.06 10*3/MM3 (ref 0–0.2)
BASOPHILS NFR BLD AUTO: 1.1 % (ref 0–1.5)
BILIRUB SERPL-MCNC: 0.5 MG/DL (ref 0–1.2)
BUN SERPL-MCNC: 25 MG/DL (ref 8–23)
BUN/CREAT SERPL: 18.9 (ref 7–25)
CALCIUM SPEC-SCNC: 9.9 MG/DL (ref 8.6–10.5)
CHLORIDE SERPL-SCNC: 104 MMOL/L (ref 98–107)
CO2 SERPL-SCNC: 25 MMOL/L (ref 22–29)
CREAT SERPL-MCNC: 1.32 MG/DL (ref 0.57–1)
DEPRECATED RDW RBC AUTO: 46.1 FL (ref 37–54)
EOSINOPHIL # BLD AUTO: 0.21 10*3/MM3 (ref 0–0.4)
EOSINOPHIL NFR BLD AUTO: 4 % (ref 0.3–6.2)
ERYTHROCYTE [DISTWIDTH] IN BLOOD BY AUTOMATED COUNT: 12.8 % (ref 12.3–15.4)
GFR SERPL CREATININE-BSD FRML MDRD: 39 ML/MIN/1.73
GLOBULIN UR ELPH-MCNC: 3.7 GM/DL
GLUCOSE SERPL-MCNC: 92 MG/DL (ref 65–99)
HBA1C MFR BLD: 5.4 % (ref 3.5–5.6)
HCT VFR BLD AUTO: 45.4 % (ref 34–46.6)
HGB BLD-MCNC: 15 G/DL (ref 12–15.9)
IMM GRANULOCYTES # BLD AUTO: 0 10*3/MM3 (ref 0–0.05)
IMM GRANULOCYTES NFR BLD AUTO: 0 % (ref 0–0.5)
INR PPP: 2.72 (ref 2–3)
LYMPHOCYTES # BLD AUTO: 1.86 10*3/MM3 (ref 0.7–3.1)
LYMPHOCYTES NFR BLD AUTO: 35.6 % (ref 19.6–45.3)
MCH RBC QN AUTO: 31.9 PG (ref 26.6–33)
MCHC RBC AUTO-ENTMCNC: 33 G/DL (ref 31.5–35.7)
MCV RBC AUTO: 96.6 FL (ref 79–97)
MONOCYTES # BLD AUTO: 0.62 10*3/MM3 (ref 0.1–0.9)
MONOCYTES NFR BLD AUTO: 11.9 % (ref 5–12)
NEUTROPHILS NFR BLD AUTO: 2.47 10*3/MM3 (ref 1.7–7)
NEUTROPHILS NFR BLD AUTO: 47.4 % (ref 42.7–76)
NRBC BLD AUTO-RTO: 0 /100 WBC (ref 0–0.2)
PLATELET # BLD AUTO: 201 10*3/MM3 (ref 140–450)
PMV BLD AUTO: 12.4 FL (ref 6–12)
POTASSIUM SERPL-SCNC: 4.4 MMOL/L (ref 3.5–5.2)
PROT SERPL-MCNC: 7.7 G/DL (ref 6–8.5)
PROTHROMBIN TIME: 28 SECONDS (ref 19.4–28.5)
RBC # BLD AUTO: 4.7 10*6/MM3 (ref 3.77–5.28)
SODIUM SERPL-SCNC: 138 MMOL/L (ref 136–145)
T4 FREE SERPL-MCNC: 1.43 NG/DL (ref 0.93–1.7)
TSH SERPL DL<=0.05 MIU/L-ACNC: 1.19 UIU/ML (ref 0.27–4.2)
VIT B12 BLD-MCNC: 754 PG/ML (ref 211–946)
WBC NRBC COR # BLD: 5.22 10*3/MM3 (ref 3.4–10.8)

## 2021-12-14 PROCEDURE — 84439 ASSAY OF FREE THYROXINE: CPT | Performed by: FAMILY MEDICINE

## 2021-12-14 PROCEDURE — 85610 PROTHROMBIN TIME: CPT | Performed by: FAMILY MEDICINE

## 2021-12-14 PROCEDURE — G0439 PPPS, SUBSEQ VISIT: HCPCS | Performed by: FAMILY MEDICINE

## 2021-12-14 PROCEDURE — 83036 HEMOGLOBIN GLYCOSYLATED A1C: CPT | Performed by: FAMILY MEDICINE

## 2021-12-14 PROCEDURE — 85025 COMPLETE CBC W/AUTO DIFF WBC: CPT | Performed by: FAMILY MEDICINE

## 2021-12-14 PROCEDURE — 99213 OFFICE O/P EST LOW 20 MIN: CPT | Performed by: FAMILY MEDICINE

## 2021-12-14 PROCEDURE — 80053 COMPREHEN METABOLIC PANEL: CPT | Performed by: FAMILY MEDICINE

## 2021-12-14 PROCEDURE — 36415 COLL VENOUS BLD VENIPUNCTURE: CPT | Performed by: FAMILY MEDICINE

## 2021-12-14 PROCEDURE — 84443 ASSAY THYROID STIM HORMONE: CPT | Performed by: FAMILY MEDICINE

## 2021-12-14 PROCEDURE — 82607 VITAMIN B-12: CPT | Performed by: FAMILY MEDICINE

## 2021-12-14 PROCEDURE — 1160F RVW MEDS BY RX/DR IN RCRD: CPT | Performed by: FAMILY MEDICINE

## 2021-12-14 PROCEDURE — 1170F FXNL STATUS ASSESSED: CPT | Performed by: FAMILY MEDICINE

## 2021-12-14 PROCEDURE — 82306 VITAMIN D 25 HYDROXY: CPT | Performed by: FAMILY MEDICINE

## 2021-12-14 NOTE — PROGRESS NOTES
The ABCs of the Annual Wellness Visit  Subsequent Medicare Wellness Visit    Chief Complaint   Patient presents with   • Medicare Wellness-subsequent     Subjective    History of Present Illness:  Arin Singh is a 76 y.o. female who presents for a Subsequent Medicare Wellness Visit.    PAF: recently started on Multaq and has resulted in reducing Afib episodes from 23% to 7%. S/p pacemaker. Also maintained on metoprolol 25 BID, verapamil 240 daily. Warfarin regimen is 6mg daily except one day she takes 7.5mg. Follows w/ DC Berger. Planning for upcoming sleep study    HTN: 120/72 today. Maintained on Prinzide 20/12.5, metoprolol 25 BID, and verapamil 240 dailiy.     OA: s/p R knee replacement and L knee bothers her most. Not taking any medication as Voltaren gel did not seem to help. Never had steroid shot. Denies pain, giving out.     The following portions of the patient's history were reviewed and   updated as appropriate: allergies, current medications, past family history, past medical history, past social history, past surgical history and problem list.    Compared to one year ago, the patient feels her physical   health is better.    Compared to one year ago, the patient feels her mental   health is better.    Recent Hospitalizations:  She was not admitted to the hospital during the last year.     Current Medical Providers:  Patient Care Team:  Albin Guillen MD as PCP - General (Internal Medicine)    Outpatient Medications Prior to Visit   Medication Sig Dispense Refill   • dronedarone (Multaq) 400 MG tablet Take 1 tablet by mouth 2 (Two) Times a Day With Meals. 180 tablet 3   • lisinopril-hydrochlorothiazide (PRINZIDE,ZESTORETIC) 20-12.5 MG per tablet Take 1 tablet by mouth Daily. 90 tablet 1   • metoprolol succinate XL (TOPROL-XL) 25 MG 24 hr tablet Take 1 tablet by mouth 2 (Two) Times a Day. 180 tablet 1   • verapamil SR (CALAN-SR) 240 MG CR tablet Take 1 tablet by mouth Daily. 90 tablet 3   •  warfarin (COUMADIN) 6 MG tablet TAKE 1 TABLET BY MOUTH  DAILY AS DIRECTED 90 tablet 3   • warfarin (COUMADIN) 7.5 MG tablet Take 1 tablet by mouth Daily. as directed (Patient taking differently: Take 7.5 mg by mouth Daily. as directed - once a week) 90 tablet 1   • Diclofenac Sodium (VOLTAREN) 1 % gel gel Apply 4 g topically to the appropriate area as directed 4 (Four) Times a Day As Needed (joint pain). 100 g 5     No facility-administered medications prior to visit.     No opioid medication identified on active medication list. I have reviewed chart for other potential  high risk medication/s and harmful drug interactions in the elderly.        Aspirin is not on active medication list.  Aspirin use is not indicated based on review of current medical condition/s. Risk of harm outweighs potential benefits.  .    Patient Active Problem List   Diagnosis   • Sick sinus syndrome (HCC)   • Presence of cardiac pacemaker   • Absolute anemia   • Coagulation factor deficiency syndrome (HCC)   • Hyperlipidemia, mixed   • Essential hypertension   • Osteoarthritis of knee   • Paroxysmal atrial fibrillation (HCC)   • Vitamin B12 deficiency   • Vitamin D deficiency   • Warfarin therapy started   • Screening for breast cancer     Advance Care Planning  Advance Directive is on file.  ACP discussion was held with the patient during this visit. Patient has an advance directive in EMR which is still valid.   Review of Systems   Constitutional: Negative for chills and fever.   HENT: Negative for congestion and rhinorrhea.    Eyes: Negative for visual disturbance.   Respiratory: Negative for cough and shortness of breath.    Cardiovascular: Negative for chest pain and palpitations.   Gastrointestinal: Negative for abdominal pain and vomiting.   Genitourinary: Negative for difficulty urinating and dysuria.   Musculoskeletal: Positive for gait problem. Negative for arthralgias and back pain.   Skin: Negative for rash.   Neurological:  "Negative for dizziness and light-headedness.   Psychiatric/Behavioral: Negative for dysphoric mood and sleep disturbance. The patient is not nervous/anxious.         Objective    Vitals:    12/14/21 0820   BP: 120/72   BP Location: Left arm   Patient Position: Sitting   Cuff Size: Adult   Pulse: 76   Resp: 16   Temp: 98.7 °F (37.1 °C)   TempSrc: Infrared   SpO2: 96%   Weight: 133 kg (294 lb)   Height: 170.2 cm (67\")     BMI Readings from Last 1 Encounters:   12/14/21 46.05 kg/m²   BMI is above normal parameters. Recommendations include: exercise counseling and nutrition counseling    Does the patient have evidence of cognitive impairment? No    Physical Exam  Constitutional:       General: She is not in acute distress.     Appearance: She is well-developed. She is obese.   HENT:      Head: Normocephalic and atraumatic.      Right Ear: Tympanic membrane and external ear normal. There is no impacted cerumen.      Left Ear: Tympanic membrane and external ear normal. There is no impacted cerumen.      Nose: Nose normal.      Mouth/Throat:      Mouth: Mucous membranes are moist.      Pharynx: No oropharyngeal exudate or posterior oropharyngeal erythema.   Eyes:      General: No scleral icterus.        Right eye: No discharge.         Left eye: No discharge.      Extraocular Movements: Extraocular movements intact.      Conjunctiva/sclera: Conjunctivae normal.      Pupils: Pupils are equal, round, and reactive to light.   Neck:      Thyroid: No thyromegaly.      Vascular: No carotid bruit.   Cardiovascular:      Rate and Rhythm: Normal rate and regular rhythm.      Heart sounds: Normal heart sounds. No murmur heard.      Pulmonary:      Effort: Pulmonary effort is normal. No respiratory distress.      Breath sounds: Normal breath sounds. No wheezing or rales.   Abdominal:      General: Bowel sounds are normal. There is no distension.      Palpations: Abdomen is soft.      Tenderness: There is no abdominal tenderness. "   Musculoskeletal:         General: No deformity. Normal range of motion.      Cervical back: Normal range of motion and neck supple.   Lymphadenopathy:      Cervical: No cervical adenopathy.   Skin:     General: Skin is warm and dry.      Findings: No rash.   Neurological:      General: No focal deficit present.      Mental Status: She is alert and oriented to person, place, and time. Mental status is at baseline.      Cranial Nerves: No cranial nerve deficit.      Sensory: No sensory deficit.   Psychiatric:         Behavior: Behavior normal.         Thought Content: Thought content normal.       Lab Results   Component Value Date    TRIG 89 10/11/2021    HDL 48 10/11/2021     (H) 10/11/2021    VLDL 16 10/11/2021        HEALTH RISK ASSESSMENT    Smoking Status:  Social History     Tobacco Use   Smoking Status Never Smoker   Smokeless Tobacco Never Used     Alcohol Consumption:  Social History     Substance and Sexual Activity   Alcohol Use No   Rare    Fall Risk Screen:  STEADI Fall Risk Assessment was completed, and patient is at LOW risk for falls.Assessment completed on:12/14/2021    Depression Screening:  PHQ-2/PHQ-9 Depression Screening 12/14/2021   Little interest or pleasure in doing things 0   Feeling down, depressed, or hopeless 0   Total Score 0     Health Habits and Functional and Cognitive Screening:  Functional & Cognitive Status 12/14/2021   Do you have difficulty preparing food and eating? No   Do you have difficulty bathing yourself, getting dressed or grooming yourself? No   Do you have difficulty using the toilet? No   Do you have difficulty moving around from place to place? No   Do you have trouble with steps or getting out of a bed or a chair? No   Current Diet Well Balanced Diet   Dental Exam Up to date   Eye Exam Other   Exercise (times per week) 1 times per week   Current Exercises Include Other;Walking   Current Exercise Activities Include -   Do you need help using the phone?  No    Are you deaf or do you have serious difficulty hearing?  No   Do you need help with transportation? No   Do you need help shopping? No   Do you need help preparing meals?  No   Do you need help with housework?  No   Do you need help with laundry? No   Do you need help taking your medications? No   Do you need help managing money? No   Do you ever drive or ride in a car without wearing a seat belt? No   Have you felt unusual stress, anger or loneliness in the last month? No   Who do you live with? Alone   If you need help, do you have trouble finding someone available to you? No   Have you been bothered in the last four weeks by sexual problems? No   Do you have difficulty concentrating, remembering or making decisions? No   Lives at [facility]/independent living    Age-appropriate Screening Schedule:  Refer to the list below for future screening recommendations based on patient's age, sex and/or medical conditions. Orders for these recommended tests are listed in the plan section. The patient has been provided with a written plan.    Health Maintenance   Topic Date Due   • URINE MICROALBUMIN  Never done   • DXA SCAN  Never done   • TDAP/TD VACCINES (1 - Tdap) Never done   • ZOSTER VACCINE (2 of 3) 01/30/2015   • DIABETIC EYE EXAM  Never done   • HEMOGLOBIN A1C  06/09/2021   • LIPID PANEL  10/11/2022   • INFLUENZA VACCINE  Completed        Assessment/Plan   CMS Preventative Services Quick Reference  Risk Factors Identified During Encounter  Immunizations Discussed/Encouraged (specific Immunizations; Tdap  Obesity/Overweight   The above risks/problems have been discussed with the patient.  Follow up actions/plans if indicated are seen below in the Assessment/Plan Section.  Pertinent information has been shared with the patient in the After Visit Summary.    Diagnoses and all orders for this visit:    1. Medicare annual wellness visit, subsequent (Primary)  -     CBC & Differential  -     Comprehensive  Metabolic Panel  -     Hemoglobin A1c  -     TSH  -     T4, Free  -     Vitamin D 25 Hydroxy  -     Vitamin B12  -     Mammo Screening Digital Tomosynthesis Bilateral With CAD; Future  -     DEXA Bone Density Axial; Future  -  Counseled regarding diet, exercise, weight loss, and preventative health maintenance items/immunizations below    2. Paroxysmal atrial fibrillation (HCC): [id number] score 4 (HTN, age, sex x2)  -     Protime-INR  - Continue home Multaq 400 twice daily per cardiology along with metoprolol 25 twice daily and verapamil 240 daily  - Continue home warfarin 6 mg daily with an extra 1.5 mg 1 day a week  - Continue cardiology [id number]  - Agree with sleep study, upcoming    3. Essential hypertension: 120/72 today  -     Comprehensive Metabolic Panel  - Continue home Prinzide 20/12.5 daily, metoprolol 25 twice daily, verapamil 240 daily    4. Osteoarthritis of both knees, unspecified osteoarthritis type  -     Comprehensive Metabolic Panel  - Recommend regular exercise and weight loss    5. Vitamin D deficiency, unspecified   -     Vitamin D 25 Hydroxy    6. Encounter for screening mammogram for malignant neoplasm of breast   -     Mammo Screening Digital Tomosynthesis Bilateral With CAD; Future    7. Abnormal findings on diagnostic imaging of other parts of musculoskeletal system   -     DEXA Bone Density Axial; Future    Preventative  Colonoscopy: 2014, potentially due 2024  Mammogram: 9/2019, ordered today  Pap smear: aged out  DEXA: Ordered today  Shingles: 12/2014  Pneumonia: Prevnar 10/2015, Pneumovax 12/2020  Tdap: Recommended, declined  Influenza: 10/2021  COVID: Completed 3 shots Pfizer (10/2021)    Follow Up:   Return in about 1 year (around 12/14/2022) for Medicare Wellness.     An After Visit Summary and PPPS were made available to the patient.

## 2022-01-12 ENCOUNTER — OFFICE VISIT (OUTPATIENT)
Dept: PULMONOLOGY | Facility: HOSPITAL | Age: 77
End: 2022-01-12

## 2022-01-12 VITALS
DIASTOLIC BLOOD PRESSURE: 82 MMHG | BODY MASS INDEX: 45.99 KG/M2 | HEART RATE: 70 BPM | RESPIRATION RATE: 12 BRPM | WEIGHT: 293 LBS | TEMPERATURE: 97.8 F | OXYGEN SATURATION: 95 % | SYSTOLIC BLOOD PRESSURE: 123 MMHG | HEIGHT: 67 IN

## 2022-01-12 DIAGNOSIS — G47.33 OSA (OBSTRUCTIVE SLEEP APNEA): Primary | ICD-10-CM

## 2022-01-12 PROCEDURE — G0463 HOSPITAL OUTPT CLINIC VISIT: HCPCS

## 2022-01-12 NOTE — PROGRESS NOTES
PULMONARY/ CRITICAL CARE/ SLEEP MEDICINE OUTPATIENT CONSULT/ FOLLOW UP NOTE        Patient Name:  Arin Singh    :  1945    Medical Record:  [id number]    PRIMARY CARE PHYSICIAN     Albin Guillen MD    REASON FOR CONSULTATION    Arin Singh is a 76 y.o. female who is referred for consultation for YESY problem  REVIEW OF SYSTEMS    Constitutional:  Denies fever or chills   Eyes:  Denies change in visual acuity   HENT:  Denies nasal congestion or sore throat   Respiratory:  Denies cough or shortness of breath   Cardiovascular:  Denies chest pain or edema   GI:  Denies abdominal pain, nausea, vomiting, bloody stools or diarrhea   :  Denies dysuria   Musculoskeletal:  Denies back pain or joint pain   Integument:  Denies rash   Neurologic:  Denies headache, focal weakness or sensory changes   Endocrine:  Denies polyuria or polydipsia   Lymphatic:  Denies swollen glands   Psychiatric:  Denies depression or anxiety     MEDICAL HISTORY    Past Medical History:   Diagnosis Date   • Coagulation factor deficiency syndrome (HCC) 2013   • Hyperlipidemia 2019   • Hypertension 2019   • Osteoarthritis of knee 10/7/2015   • Other pulmonary embolism without acute cor pulmonale (HCC) 3/8/2013   • Paroxysmal atrial fibrillation ([facility]) 10/4/2017   • Presence of cardiac pacemaker 2019   • Sick sinus syndrome ([facility]) 2019   • Vitamin B12 deficiency 2017   • Vitamin D deficiency 2018        SURGICAL HISTORY    Past Surgical History:   Procedure Laterality Date   • INSERT / REPLACE / REMOVE PACEMAKER      BS AV Sequential   • REPLACEMENT TOTAL KNEE Right         FAMILY HISTORY    Family History   Problem Relation Age of Onset   • Heart attack Mother        SOCIAL HISTORY    Social History     Tobacco Use   • Smoking status: Never Smoker   • Smokeless tobacco: Never Used   Substance Use Topics   • Alcohol use: No        ALLERGIES    No Known Allergies      MEDICATIONS    Current  "Outpatient Medications on File Prior to Visit   Medication Sig Dispense Refill   • dronedarone (Multaq) 400 MG tablet Take 1 tablet by mouth 2 (Two) Times a Day With Meals. 180 tablet 3   • lisinopril-hydrochlorothiazide (PRINZIDE,ZESTORETIC) 20-12.5 MG per tablet Take 1 tablet by mouth Daily. 90 tablet 1   • metoprolol succinate XL (TOPROL-XL) 25 MG 24 hr tablet Take 1 tablet by mouth 2 (Two) Times a Day. 180 tablet 1   • verapamil SR (CALAN-SR) 240 MG CR tablet Take 1 tablet by mouth Daily. 90 tablet 3   • warfarin (COUMADIN) 6 MG tablet TAKE 1 TABLET BY MOUTH  DAILY AS DIRECTED 90 tablet 3   • warfarin (COUMADIN) 7.5 MG tablet Take 1 tablet by mouth Daily. as directed (Patient taking differently: Take 7.5 mg by mouth Daily. as directed - once a week) 90 tablet 1     No current facility-administered medications on file prior to visit.       PHYSICAL EXAM    Vitals:    01/12/22 1253   BP: 123/82   BP Location: Left arm   Patient Position: Sitting   Cuff Size: Adult   Pulse: 70   Resp: 12   Temp: 97.8 °F (36.6 °C)   TempSrc: Oral   SpO2: 95%   Weight: 133 kg (294 lb)   Height: 170.2 cm (67\")        Constitutional:  Well developed, well nourished, no acute distress, non-toxic appearance   Eyes:  PERRL, conjunctiva normal   HENT:  Atraumatic, external ears normal, nose normal, oropharynx moist, no pharyngeal exudates. mallampatti   Neck- normal range of motion, no tenderness, supple   Respiratory:  No respiratory distress, normal breath sounds, no rales, no wheezing   Cardiovascular:  Normal rate, normal rhythm, no murmurs, no gallops, no rubs   GI:  Soft, nondistended, normal bowel sounds, nontender, no organomegaly, no mass, no rebound, no guarding   :  No costovertebral angle tenderness   Musculoskeletal:  No edema, no tenderness, no deformities. Back- no tenderness  Integument:  Well hydrated, no rash   Lymphatic:  No lymphadenopathy noted   Neurologic:  Alert & oriented x 3, CN 2-12 normal, normal motor " function, normal sensory function, no focal deficits noted   Psychiatric:  Speech and behavior appropriate     No radiology results for the last 90 days.       ASSESSMENT & PLAN:      Features of obstructive sleep apnea  Excessive sleepiness. snoring, Fatique and Apneas    Afib  S/p pacemaker.      HTN     OA    Plan: home sleep study          This document has been electronically signed by  Pablito Albrecht MD  13:48 EST

## 2022-01-14 ENCOUNTER — LAB (OUTPATIENT)
Dept: FAMILY MEDICINE CLINIC | Facility: CLINIC | Age: 77
End: 2022-01-14

## 2022-01-14 DIAGNOSIS — Z79.01 WARFARIN ANTICOAGULATION: Primary | ICD-10-CM

## 2022-01-14 LAB
INR PPP: 2.35 (ref 2–3)
PROTHROMBIN TIME: 24.5 SECONDS (ref 19.4–28.5)

## 2022-01-14 PROCEDURE — 85610 PROTHROMBIN TIME: CPT | Performed by: INTERNAL MEDICINE

## 2022-01-14 PROCEDURE — 36415 COLL VENOUS BLD VENIPUNCTURE: CPT

## 2022-01-20 PROCEDURE — 93294 REM INTERROG EVL PM/LDLS PM: CPT | Performed by: NURSE PRACTITIONER

## 2022-01-20 PROCEDURE — 93296 REM INTERROG EVL PM/IDS: CPT | Performed by: NURSE PRACTITIONER

## 2022-02-11 ENCOUNTER — LAB (OUTPATIENT)
Dept: FAMILY MEDICINE CLINIC | Facility: CLINIC | Age: 77
End: 2022-02-11

## 2022-02-11 DIAGNOSIS — Z79.01 WARFARIN ANTICOAGULATION: Primary | ICD-10-CM

## 2022-02-11 LAB
INR PPP: 2.2 (ref 2–3)
PROTHROMBIN TIME: 23 SECONDS (ref 19.4–28.5)

## 2022-02-11 PROCEDURE — 36415 COLL VENOUS BLD VENIPUNCTURE: CPT

## 2022-02-11 PROCEDURE — 85610 PROTHROMBIN TIME: CPT | Performed by: FAMILY MEDICINE

## 2022-02-24 ENCOUNTER — HOSPITAL ENCOUNTER (OUTPATIENT)
Dept: SLEEP MEDICINE | Facility: HOSPITAL | Age: 77
Discharge: HOME | End: 2022-02-24
Admitting: INTERNAL MEDICINE

## 2022-02-24 DIAGNOSIS — G47.33 OSA (OBSTRUCTIVE SLEEP APNEA): ICD-10-CM

## 2022-02-24 PROCEDURE — 95806 SLEEP STUDY UNATT&RESP EFFT: CPT

## 2022-03-02 DIAGNOSIS — Z12.31 ENCOUNTER FOR SCREENING MAMMOGRAM FOR MALIGNANT NEOPLASM OF BREAST: ICD-10-CM

## 2022-03-02 DIAGNOSIS — R93.7 ABNORMAL FINDINGS ON DIAGNOSTIC IMAGING OF OTHER PARTS OF MUSCULOSKELETAL SYSTEM: ICD-10-CM

## 2022-03-02 DIAGNOSIS — Z00.00 MEDICARE ANNUAL WELLNESS VISIT, SUBSEQUENT: ICD-10-CM

## 2022-03-11 ENCOUNTER — LAB (OUTPATIENT)
Dept: FAMILY MEDICINE CLINIC | Facility: CLINIC | Age: 77
End: 2022-03-11

## 2022-03-11 DIAGNOSIS — Z79.01 WARFARIN THERAPY STARTED: Primary | ICD-10-CM

## 2022-03-11 LAB
INR PPP: 3.29 (ref 2–3)
PROTHROMBIN TIME: 33.4 SECONDS (ref 19.4–28.5)

## 2022-03-11 PROCEDURE — 85610 PROTHROMBIN TIME: CPT | Performed by: FAMILY MEDICINE

## 2022-03-11 PROCEDURE — 36415 COLL VENOUS BLD VENIPUNCTURE: CPT

## 2022-03-18 DIAGNOSIS — G47.33 OSA (OBSTRUCTIVE SLEEP APNEA): Primary | ICD-10-CM

## 2022-04-11 ENCOUNTER — LAB (OUTPATIENT)
Dept: FAMILY MEDICINE CLINIC | Facility: CLINIC | Age: 77
End: 2022-04-11

## 2022-04-11 DIAGNOSIS — Z79.01 WARFARIN THERAPY STARTED: Primary | ICD-10-CM

## 2022-04-11 LAB
INR PPP: 2.19 (ref 2–3)
PROTHROMBIN TIME: 21.6 SECONDS (ref 19.4–28.5)

## 2022-04-11 PROCEDURE — 85610 PROTHROMBIN TIME: CPT | Performed by: FAMILY MEDICINE

## 2022-04-11 PROCEDURE — 36415 COLL VENOUS BLD VENIPUNCTURE: CPT

## 2022-04-14 RX ORDER — METOPROLOL SUCCINATE 25 MG/1
TABLET, EXTENDED RELEASE ORAL
Qty: 180 TABLET | Refills: 3 | Status: SHIPPED | OUTPATIENT
Start: 2022-04-14 | End: 2023-04-06

## 2022-05-09 DIAGNOSIS — E55.9 VITAMIN D DEFICIENCY: ICD-10-CM

## 2022-05-09 DIAGNOSIS — Z00.00 ENCOUNTER FOR GENERAL ADULT MEDICAL EXAMINATION WITHOUT ABNORMAL FINDINGS: ICD-10-CM

## 2022-05-09 DIAGNOSIS — I10 ESSENTIAL HYPERTENSION: ICD-10-CM

## 2022-05-09 DIAGNOSIS — R79.89 LOW VITAMIN D LEVEL: ICD-10-CM

## 2022-05-09 DIAGNOSIS — Z00.00 MEDICARE ANNUAL WELLNESS VISIT, SUBSEQUENT: Primary | ICD-10-CM

## 2022-05-13 ENCOUNTER — LAB (OUTPATIENT)
Dept: FAMILY MEDICINE CLINIC | Facility: CLINIC | Age: 77
End: 2022-05-13

## 2022-05-13 DIAGNOSIS — Z00.00 MEDICARE ANNUAL WELLNESS VISIT, SUBSEQUENT: ICD-10-CM

## 2022-05-13 DIAGNOSIS — E55.9 VITAMIN D DEFICIENCY: ICD-10-CM

## 2022-05-13 DIAGNOSIS — Z00.00 ENCOUNTER FOR GENERAL ADULT MEDICAL EXAMINATION WITHOUT ABNORMAL FINDINGS: ICD-10-CM

## 2022-05-13 DIAGNOSIS — I48.0 PAROXYSMAL ATRIAL FIBRILLATION: Primary | ICD-10-CM

## 2022-05-13 DIAGNOSIS — I10 ESSENTIAL HYPERTENSION: ICD-10-CM

## 2022-05-13 LAB
25(OH)D3 SERPL-MCNC: 37 NG/ML (ref 30–100)
ALBUMIN SERPL-MCNC: 3.8 G/DL (ref 3.5–5.2)
ALBUMIN/GLOB SERPL: 1.2 G/DL
ALP SERPL-CCNC: 69 U/L (ref 39–117)
ALT SERPL W P-5'-P-CCNC: 12 U/L (ref 1–33)
ANION GAP SERPL CALCULATED.3IONS-SCNC: 14 MMOL/L (ref 5–15)
AST SERPL-CCNC: 18 U/L (ref 1–32)
BASOPHILS # BLD AUTO: 0.04 10*3/MM3 (ref 0–0.2)
BASOPHILS NFR BLD AUTO: 0.8 % (ref 0–1.5)
BILIRUB SERPL-MCNC: 0.6 MG/DL (ref 0–1.2)
BUN SERPL-MCNC: 25 MG/DL (ref 8–23)
BUN/CREAT SERPL: 22.9 (ref 7–25)
CALCIUM SPEC-SCNC: 9.7 MG/DL (ref 8.6–10.5)
CHLORIDE SERPL-SCNC: 106 MMOL/L (ref 98–107)
CHOLEST SERPL-MCNC: 172 MG/DL (ref 0–200)
CO2 SERPL-SCNC: 22 MMOL/L (ref 22–29)
CREAT SERPL-MCNC: 1.09 MG/DL (ref 0.57–1)
DEPRECATED RDW RBC AUTO: 42.6 FL (ref 37–54)
EGFRCR SERPLBLD CKD-EPI 2021: 52.1 ML/MIN/1.73
EOSINOPHIL # BLD AUTO: 0.26 10*3/MM3 (ref 0–0.4)
EOSINOPHIL NFR BLD AUTO: 5.1 % (ref 0.3–6.2)
ERYTHROCYTE [DISTWIDTH] IN BLOOD BY AUTOMATED COUNT: 12.4 % (ref 12.3–15.4)
GLOBULIN UR ELPH-MCNC: 3.2 GM/DL
GLUCOSE SERPL-MCNC: 89 MG/DL (ref 65–99)
HBA1C MFR BLD: 5.3 % (ref 3.5–5.6)
HCT VFR BLD AUTO: 42.8 % (ref 34–46.6)
HDLC SERPL-MCNC: 50 MG/DL (ref 40–60)
HGB BLD-MCNC: 14.4 G/DL (ref 12–15.9)
IMM GRANULOCYTES # BLD AUTO: 0.01 10*3/MM3 (ref 0–0.05)
IMM GRANULOCYTES NFR BLD AUTO: 0.2 % (ref 0–0.5)
INR PPP: 3.01 (ref 2–3)
LDLC SERPL CALC-MCNC: 102 MG/DL (ref 0–100)
LDLC/HDLC SERPL: 2.01 {RATIO}
LYMPHOCYTES # BLD AUTO: 1.71 10*3/MM3 (ref 0.7–3.1)
LYMPHOCYTES NFR BLD AUTO: 33.7 % (ref 19.6–45.3)
MCH RBC QN AUTO: 31.9 PG (ref 26.6–33)
MCHC RBC AUTO-ENTMCNC: 33.6 G/DL (ref 31.5–35.7)
MCV RBC AUTO: 94.7 FL (ref 79–97)
MONOCYTES # BLD AUTO: 0.61 10*3/MM3 (ref 0.1–0.9)
MONOCYTES NFR BLD AUTO: 12 % (ref 5–12)
NEUTROPHILS NFR BLD AUTO: 2.45 10*3/MM3 (ref 1.7–7)
NEUTROPHILS NFR BLD AUTO: 48.2 % (ref 42.7–76)
NRBC BLD AUTO-RTO: 0 /100 WBC (ref 0–0.2)
PLATELET # BLD AUTO: 173 10*3/MM3 (ref 140–450)
PMV BLD AUTO: 12.1 FL (ref 6–12)
POTASSIUM SERPL-SCNC: 4.2 MMOL/L (ref 3.5–5.2)
PROT SERPL-MCNC: 7 G/DL (ref 6–8.5)
PROTHROMBIN TIME: 29.1 SECONDS (ref 19.4–28.5)
RBC # BLD AUTO: 4.52 10*6/MM3 (ref 3.77–5.28)
SODIUM SERPL-SCNC: 142 MMOL/L (ref 136–145)
T4 FREE SERPL-MCNC: 1.38 NG/DL (ref 0.93–1.7)
TRIGL SERPL-MCNC: 108 MG/DL (ref 0–150)
TSH SERPL DL<=0.05 MIU/L-ACNC: 1.1 UIU/ML (ref 0.27–4.2)
VIT B12 BLD-MCNC: 336 PG/ML (ref 211–946)
VLDLC SERPL-MCNC: 20 MG/DL (ref 5–40)
WBC NRBC COR # BLD: 5.08 10*3/MM3 (ref 3.4–10.8)

## 2022-05-13 PROCEDURE — 84439 ASSAY OF FREE THYROXINE: CPT | Performed by: FAMILY MEDICINE

## 2022-05-13 PROCEDURE — 85610 PROTHROMBIN TIME: CPT | Performed by: FAMILY MEDICINE

## 2022-05-13 PROCEDURE — 83036 HEMOGLOBIN GLYCOSYLATED A1C: CPT | Performed by: FAMILY MEDICINE

## 2022-05-13 PROCEDURE — 82607 VITAMIN B-12: CPT | Performed by: FAMILY MEDICINE

## 2022-05-13 PROCEDURE — 85025 COMPLETE CBC W/AUTO DIFF WBC: CPT | Performed by: FAMILY MEDICINE

## 2022-05-13 PROCEDURE — 82306 VITAMIN D 25 HYDROXY: CPT | Performed by: FAMILY MEDICINE

## 2022-05-13 PROCEDURE — 36415 COLL VENOUS BLD VENIPUNCTURE: CPT

## 2022-05-13 PROCEDURE — 80061 LIPID PANEL: CPT | Performed by: FAMILY MEDICINE

## 2022-05-13 PROCEDURE — 80053 COMPREHEN METABOLIC PANEL: CPT | Performed by: FAMILY MEDICINE

## 2022-05-13 PROCEDURE — 84443 ASSAY THYROID STIM HORMONE: CPT | Performed by: FAMILY MEDICINE

## 2022-06-15 ENCOUNTER — OFFICE VISIT (OUTPATIENT)
Dept: FAMILY MEDICINE CLINIC | Facility: CLINIC | Age: 77
End: 2022-06-15

## 2022-06-15 ENCOUNTER — LAB (OUTPATIENT)
Dept: FAMILY MEDICINE CLINIC | Facility: CLINIC | Age: 77
End: 2022-06-15

## 2022-06-15 VITALS
TEMPERATURE: 96.6 F | BODY MASS INDEX: 45.39 KG/M2 | WEIGHT: 289.2 LBS | SYSTOLIC BLOOD PRESSURE: 115 MMHG | HEIGHT: 67 IN | RESPIRATION RATE: 12 BRPM | OXYGEN SATURATION: 95 % | HEART RATE: 80 BPM | DIASTOLIC BLOOD PRESSURE: 70 MMHG

## 2022-06-15 DIAGNOSIS — E66.01 CLASS 3 SEVERE OBESITY WITH BODY MASS INDEX (BMI) OF 45.0 TO 49.9 IN ADULT, UNSPECIFIED OBESITY TYPE, UNSPECIFIED WHETHER SERIOUS COMORBIDITY PRESENT: ICD-10-CM

## 2022-06-15 DIAGNOSIS — M85.80 OSTEOPENIA, UNSPECIFIED LOCATION: ICD-10-CM

## 2022-06-15 DIAGNOSIS — G47.33 OSA (OBSTRUCTIVE SLEEP APNEA): ICD-10-CM

## 2022-06-15 DIAGNOSIS — I48.0 PAROXYSMAL ATRIAL FIBRILLATION: Primary | ICD-10-CM

## 2022-06-15 LAB
INR PPP: 2.98 (ref 2–3)
PROTHROMBIN TIME: 28.9 SECONDS (ref 19.4–28.5)

## 2022-06-15 PROCEDURE — 99214 OFFICE O/P EST MOD 30 MIN: CPT | Performed by: FAMILY MEDICINE

## 2022-06-15 PROCEDURE — 36415 COLL VENOUS BLD VENIPUNCTURE: CPT | Performed by: FAMILY MEDICINE

## 2022-06-15 PROCEDURE — 85610 PROTHROMBIN TIME: CPT | Performed by: FAMILY MEDICINE

## 2022-06-15 RX ORDER — LISINOPRIL AND HYDROCHLOROTHIAZIDE 20; 12.5 MG/1; MG/1
1 TABLET ORAL DAILY
Qty: 90 TABLET | Refills: 3 | Status: SHIPPED | OUTPATIENT
Start: 2022-06-15

## 2022-06-15 NOTE — PROGRESS NOTES
Chief Complaint   Patient presents with   • Atrial Fibrillation   • Diabetes     HPI  Arin Singh is a 76 y.o. female that presents for   Chief Complaint   Patient presents with   • Atrial Fibrillation   • Diabetes     PAF: Denies any CP, palpitations, SOB. Generally asymptomatic aside from possibly fatigue but doesn't know when she is in afib. Maintained on Multaq 400 BID, metoprolol 25 BID, verapamil 240 daily. Recent pacemaker interrogation w/ AF burden down to 4%. Warfarin regimen is 6mg daily. Follows w/ [facility]- Ab.     Osteopenia: 3/2021 DEXA revealed osteopenia. She has since started on Ca/VitD but is unsure if this is the right medication    YESY: patient did have recent sleep study due to diagnosis of atrial fibrillation. Very mild YESY was noted AHI of 6. She went ahead and started on CPAP and is doing well with this. Feels slightly better rested w/ this.    Obesity: patient has worked at reducing portion sizes, which has resulted in 5lb weight gain since last visit. Doing chair exercise group weekly. Could be doing more. Previously swimming at the [employer]    Review of Systems   Constitutional: Positive for fatigue. Negative for unexpected weight loss.   Respiratory: Negative for cough and shortness of breath.    Cardiovascular: Negative for chest pain and palpitations.   Psychiatric/Behavioral: Positive for sleep disturbance.     The following portions of the patient's history were reviewed and updated as appropriate: problem list, past medical history, past surgical history, allergies, current medication    Problem List Tab  Patient History Tab  Immunizations Tab  Medications Tab  Chart Review Tab  Care Everywhere Tab  Synopsis Tab    PE  Vitals:    06/15/22 0900   BP: 115/70   Pulse: 80   Resp: 12   Temp: 96.6 °F (35.9 °C)   SpO2: 95%     Body mass index is 45.3 kg/m².  General: Obese, NAD  Head: AT/NC  Eyes: EOMI, anicteric sclera  Resp: CTAB, SCR, BS equal  CV: RRR w/o m/r/g; 2+ pulses  GI: Soft, NT/ND,  +BS  MSK: FROM, no deformity, no edema  Skin: Warm, dry, intact  Neuro: Alert and oriented. No focal deficits  Psych: Appropriate mood and affect    Imaging  No Images in the past 120 days found..    Assessment & Plan   Arin Singh is a 76 y.o. female that presents for   Chief Complaint   Patient presents with   • Atrial Fibrillation   • Diabetes     Diagnoses and all orders for this visit:    1. Paroxysmal atrial fibrillation (HCC) (Primary): Recent pacemaker interrogation w/ AF burden down to 4%  -     Protime-INR  - Cont home Multaq 400 BID, metoprolol 25 BID, verapamil 240 daily  - Cont home warfarin 6mg daily  - Cont CARDS f/u- Ab    2. Osteopenia, unspecified location: 3/2021 DEXA revealed osteopenia   - Cont home Ca/VitD and regular exercise    3. YESY (obstructive sleep apnea): mild   - Cont home CPAP    4. Class 3 severe obesity with body mass index (BMI) of 45.0 to 49.9 in adult, unspecified obesity type, unspecified whether serious comorbidity present (HCC)   - Counseled regarding continued diet and exercise efforts    Patient has been erroneously marked as diabetic. Based on the available clinical information, she does not have diabetes and should therefore be excluded from diabetic health maintenance and quality measures for the remainder of the reporting period.     Return in about 6 months (around 12/15/2022) for Medicare Wellness.

## 2022-06-22 ENCOUNTER — CLINICAL SUPPORT NO REQUIREMENTS (OUTPATIENT)
Dept: CARDIOLOGY | Facility: CLINIC | Age: 77
End: 2022-06-22

## 2022-06-22 ENCOUNTER — OFFICE VISIT (OUTPATIENT)
Dept: CARDIOLOGY | Facility: CLINIC | Age: 77
End: 2022-06-22

## 2022-06-22 VITALS
SYSTOLIC BLOOD PRESSURE: 110 MMHG | WEIGHT: 293 LBS | HEIGHT: 67 IN | DIASTOLIC BLOOD PRESSURE: 78 MMHG | RESPIRATION RATE: 18 BRPM | BODY MASS INDEX: 45.99 KG/M2

## 2022-06-22 DIAGNOSIS — Z95.0 PRESENCE OF CARDIAC PACEMAKER: ICD-10-CM

## 2022-06-22 DIAGNOSIS — I49.5 SICK SINUS SYNDROME: Primary | ICD-10-CM

## 2022-06-22 DIAGNOSIS — R60.0 LOWER EXTREMITY EDEMA: Primary | ICD-10-CM

## 2022-06-22 DIAGNOSIS — R06.09 DOE (DYSPNEA ON EXERTION): ICD-10-CM

## 2022-06-22 PROCEDURE — 93000 ELECTROCARDIOGRAM COMPLETE: CPT | Performed by: INTERNAL MEDICINE

## 2022-06-22 PROCEDURE — 93280 PM DEVICE PROGR EVAL DUAL: CPT | Performed by: NURSE PRACTITIONER

## 2022-06-22 PROCEDURE — 99214 OFFICE O/P EST MOD 30 MIN: CPT | Performed by: INTERNAL MEDICINE

## 2022-06-29 NOTE — PROGRESS NOTES
DEVICE INTERROGATION:  IN OFFICE    DEVICE TYPE:   Dual-chamber pacemaker    :   [employer]    BATTERY:  Stable    TIME TO ELECTIVE REPLACEMENT INDICATORS:   1.5 years    CHARGE TIME:   Not applicable        LEAD DATA:       DEVICE REPROGRAMMED FOR TESTING      Atrial:   0.3 mV, 577 ohms, 0.9 V@0.4 ms    Ventricular:     12.5 mV, 611 ohms, 1.1 V@0.4 ms    LV:      Pacemaker dependent:   No      Atrial pacing percentage: 66%    Ventricular pacing percentage: 90%      Arrhythmia Logbook Reviewed: KELLEE. fib percentage 4%.        Summary:    Stable Device Function    No significant arhythmia burden.     Battery status is stable.    Reviewed with: Dr. Berger      NEXT IN OFFICE DEVICE CHECK DUE: At next visit    REMOTE DEVICE INTERROGATIONS: Ongoing      Patient's V pacing percentage is 90%.  She has a very long first-degree AV block.  Previously with extended AV delay to promote intrinsic conduction however even with prolonged AV delay she is still ventricular pacing.  I did change her AV delays to more physiologic at 180 ms.

## 2022-07-01 RX ORDER — VERAPAMIL HYDROCHLORIDE 240 MG/1
240 TABLET, FILM COATED, EXTENDED RELEASE ORAL DAILY
Qty: 90 TABLET | Refills: 3 | Status: SHIPPED | OUTPATIENT
Start: 2022-07-01

## 2022-07-01 NOTE — PROGRESS NOTES
Cardiology Clinic Note  Hernesto Berger MD, PhD    Subjective:     Encounter Date:06/22/2022      Patient ID: Arin Singh is a 76 y.o. female.    Chief Complaint:  Chief Complaint   Patient presents with   • Follow-up       HPI:    I the pleasure to see this 76-year-old female today in follow-up,  she has a cardiac history of sick sinus syndrome status post pacemaker as well as tachybradycardia syndrome with paroxysmal atrial fibrillation on Coumadin therapy for thromboembolic disease with MTHFR gene mutation and again thromboembolic disease in the past remotely.  She was on anticoagulation when she first was diagnosed with atrial fibrillation.  She has no systolic heart failure with preserved LVEF, no chest pain or anginal symptoms.  She is not on antiarrhythmic therapy and is only on verapamil and metoprolol as rate and rhythm control strategy.  She does not wish to be on anything that requires hospitalization such as dofetilide, no wish for any ablation that would require hospitalization given Covid concerns.  EKG today reveals AV paced rhythm at 70, with chronic RV pacing and a high burden we will do 1 to make sure that she is not having any issues with chronic RV pacing and therefore will order 2D echo with increased pacing demand, she does have some lower extremity edema and chronic dyspnea on exertion which is likely multifactorial.  She is tolerating Multaq for atrial fibrillation.  She has no other complaints today.  She has controlled essential hypertension, stable Coumadin therapy with INR.     She presents back for follow-up.  Device interrogation today reveals much improved burden of atrial fibrillation on Genette her own, she still remains on Coumadin with no bleeding.  She is pending LFTs and routine labs.  Otherwise she feels as good as she has in quite some time.  2D echo is pending     Review of systems otherwise negative x14 point review of systems except was mentioned above     Historical data  "copied forward from previous encounters in EMR is unchanged     Exam  Regular rate rhythm no rubs gallops no heave no lift 70  Obese soft nontender nondistended bowel sounds are positive  Pacemaker site clean and dry  Clear to auscultation  No clubbing cyanosis or edema  Pulses 2+ equal bilaterally  Unchanged from prior     Assessment plan per my encounter  Sick sinus syndrome with presence of pacemaker  Paroxysmal A. fib RVR, better controlled on Multaq, increased RV pacing burden however  Essential hypertension  Get 2D echo to evaluate impact of LV function with more significant RV pacing burden  Continue Multaq 400 twice daily  Continue metoprolol and verapamil  LFTs are okay  Recheck INR   Multaq does not require thyroid function testing or PFTs  Resee back in 6 months s for A. fib burden on interrogation  Continue other antihypertensives  Continue Coumadin but adjust dosing per levels for INR 2-3     Back to clinic in 6 months     Hernesto Berger MD, PhD     Medicines reviewed         Historical data copied forward from previous encounters in EMR including the history, exam, and assessment/plan has been reviewed and is unchanged unless noted otherwise.    Cardiac medicines reviewed with risk, benefits, and necessity of each discussed.    Risk and benefit of cardiac testing reviewed including death heart attack stroke pain bleeding infection need for vascular /cardiovascular surgery were discussed and the patient     Objective:         /78 (BP Location: Left arm, Patient Position: Sitting)   Resp 18   Ht 170.2 cm (67.01\")   Wt 135 kg (297 lb)   BMI 46.51 kg/m²     Physical Exam    Assessment:         Diagnoses and all orders for this visit:    1. Lower extremity edema (Primary)  -     Adult Transthoracic Echo Complete W/ Cont if Necessary Per Protocol; Future    2. FRANCIS (dyspnea on exertion)  -     Adult Transthoracic Echo Complete W/ Cont if Necessary Per Protocol; Future           Plan:    "           The pleasure to be involved in this patient's cardiovascular care.  Please call with any questions or concerns  Hernesto Berger MD, PhD    Most recent EKG as reviewed and interpreted by me:    ECG 12 Lead    Date/Time: 6/22/2022 2:01 PM  Performed by: Hernesto Berger MD  Authorized by: Hernesto Berger MD   Comparison: not compared with previous ECG   Rhythm: paced  Rate: normal    Clinical impression: abnormal EKG  Comments: AV paced rhythm             Most recent echo as reviewed and interpreted by me:      Most recent stress test as reviewed and interpreted by me:      Most recent cardiac catheterization as reviewed interpreted by me:  No results found for this or any previous visit.    The following portions of the patient's history were reviewed and updated as appropriate: allergies, current medications, past family history, past medical history, past social history, past surgical history and problem list.      ROS:  14 point review of systems negative except as mentioned above    Current Outpatient Medications:   •  dronedarone (Multaq) 400 MG tablet, Take 1 tablet by mouth 2 (Two) Times a Day With Meals., Disp: 180 tablet, Rfl: 3  •  lisinopril-hydrochlorothiazide (PRINZIDE,ZESTORETIC) 20-12.5 MG per tablet, TAKE 1 TABLET BY MOUTH  DAILY, Disp: 90 tablet, Rfl: 3  •  metoprolol succinate XL (TOPROL-XL) 25 MG 24 hr tablet, TAKE 1 TABLET BY MOUTH  TWICE DAILY, Disp: 180 tablet, Rfl: 3  •  warfarin (COUMADIN) 6 MG tablet, TAKE 1 TABLET BY MOUTH  DAILY AS DIRECTED, Disp: 90 tablet, Rfl: 3  •  verapamil SR (CALAN-SR) 240 MG CR tablet, TAKE 1 TABLET BY MOUTH  DAILY, Disp: 90 tablet, Rfl: 3    Problem List:  Patient Active Problem List   Diagnosis   • Sick sinus syndrome (HCC)   • Presence of cardiac pacemaker   • Absolute anemia   • Coagulation factor deficiency syndrome (HCC)   • Hyperlipidemia, mixed   • Essential hypertension   • Osteoarthritis of knee   • Paroxysmal atrial fibrillation  (HCC)   • Vitamin B12 deficiency   • Vitamin D deficiency   • Warfarin therapy started   • Screening for breast cancer   • Osteopenia     Past Medical History:  Past Medical History:   Diagnosis Date   • Coagulation factor deficiency syndrome (HCC) 6/4/2013   • Hyperlipidemia 9/12/2019   • Hypertension 9/12/2019   • Osteoarthritis of knee 10/7/2015   • Other pulmonary embolism without acute cor pulmonale (HCC) 3/8/2013   • Paroxysmal atrial fibrillation (HCC) 10/4/2017   • Presence of cardiac pacemaker 9/9/2019   • Sick sinus syndrome (HCC) 9/9/2019   • Vitamin B12 deficiency 11/2/2017   • Vitamin D deficiency 9/25/2018     Past Surgical History:  Past Surgical History:   Procedure Laterality Date   • INSERT / REPLACE / REMOVE PACEMAKER  2016    BS AV Sequential   • REPLACEMENT TOTAL KNEE Right      Social History:  Social History     Socioeconomic History   • Marital status:    Tobacco Use   • Smoking status: Never Smoker   • Smokeless tobacco: Never Used   Substance and Sexual Activity   • Alcohol use: No   • Drug use: No   • Sexual activity: Defer     Allergies:  No Known Allergies  Immunizations:  Immunization History   Administered Date(s) Administered   • COVID-19 (PFIZER) PURPLE CAP 01/22/2021, 02/12/2021, 10/14/2021   • Fluad Quad 65+ 09/25/2020   • Flucelvax Quad Vial =>4yrs 10/18/2019   • Fluzone High Dose =>65 Years ([facility] ONLY) 10/07/2015, 10/18/2016, 09/22/2017, 09/25/2018   • Fluzone High-Dose 65+yrs 10/11/2021   • Influenza Whole 11/16/2005, 11/15/2006, 09/23/2009   • Influenza, Unspecified 10/18/2019   • Pneumococcal Conjugate 13-Valent (PCV13) 10/12/2015   • Pneumococcal Polysaccharide (PPSV23) 12/09/2020   • Zostavax 12/05/2014            In-Office Procedure(s):  No orders to display        ASCVD RIsk Score::  The 10-year ASCVD risk score (Shyanne MARINO Jr., et al., 2013) is: 21.5%    Values used to calculate the score:      Age: 76 years      Sex: Female      Is Non- :  No      Diabetic: No      Tobacco smoker: No      Systolic Blood Pressure: 110 mmHg      Is BP treated: Yes      HDL Cholesterol: 50 mg/dL      Total Cholesterol: 172 mg/dL    Imaging:                 Lab Review:   Office Visit on 06/15/2022   Component Date Value   • Protime 06/15/2022 28.9 (A)   • INR 06/15/2022 2.98    Orders Only on 05/13/2022   Component Date Value   • Protime 05/13/2022 29.1 (A)   • INR 05/13/2022 3.01 (A)   Lab on 05/13/2022   Component Date Value   • Glucose 05/13/2022 89    • BUN 05/13/2022 25 (A)   • Creatinine 05/13/2022 1.09 (A)   • Sodium 05/13/2022 142    • Potassium 05/13/2022 4.2    • Chloride 05/13/2022 106    • CO2 05/13/2022 22.0    • Calcium 05/13/2022 9.7    • Total Protein 05/13/2022 7.0    • Albumin 05/13/2022 3.80    • ALT (SGPT) 05/13/2022 12    • AST (SGOT) 05/13/2022 18    • Alkaline Phosphatase 05/13/2022 69    • Total Bilirubin 05/13/2022 0.6    • Globulin 05/13/2022 3.2    • A/G Ratio 05/13/2022 1.2    • BUN/Creatinine Ratio 05/13/2022 22.9    • Anion Gap 05/13/2022 14.0    • eGFR 05/13/2022 52.1 (A)   • Hemoglobin A1C 05/13/2022 5.3    • Total Cholesterol 05/13/2022 172    • Triglycerides 05/13/2022 108    • HDL Cholesterol 05/13/2022 50    • LDL Cholesterol  05/13/2022 102 (A)   • VLDL Cholesterol 05/13/2022 20    • LDL/HDL Ratio 05/13/2022 2.01    • TSH 05/13/2022 1.100    • Free T4 05/13/2022 1.38    • 25 Hydroxy, Vitamin D 05/13/2022 37.0    • Vitamin B-12 05/13/2022 336    • WBC 05/13/2022 5.08    • RBC 05/13/2022 4.52    • Hemoglobin 05/13/2022 14.4    • Hematocrit 05/13/2022 42.8    • MCV 05/13/2022 94.7    • MCH 05/13/2022 31.9    • MCHC 05/13/2022 33.6    • RDW 05/13/2022 12.4    • RDW-SD 05/13/2022 42.6    • MPV 05/13/2022 12.1 (A)   • Platelets 05/13/2022 173    • Neutrophil % 05/13/2022 48.2    • Lymphocyte % 05/13/2022 33.7    • Monocyte % 05/13/2022 12.0    • Eosinophil % 05/13/2022 5.1    • Basophil % 05/13/2022 0.8    • Immature Grans % 05/13/2022 0.2     • Neutrophils, Absolute 05/13/2022 2.45    • Lymphocytes, Absolute 05/13/2022 1.71    • Monocytes, Absolute 05/13/2022 0.61    • Eosinophils, Absolute 05/13/2022 0.26    • Basophils, Absolute 05/13/2022 0.04    • Immature Grans, Absolute 05/13/2022 0.01    • nRBC 05/13/2022 0.0    Lab on 04/11/2022   Component Date Value   • Protime 04/11/2022 21.6    • INR 04/11/2022 2.19    Lab on 03/11/2022   Component Date Value   • Protime 03/11/2022 33.4 (A)   • INR 03/11/2022 3.29 (A)   Lab on 02/11/2022   Component Date Value   • Protime 02/11/2022 23.0    • INR 02/11/2022 2.20    Lab on 01/14/2022   Component Date Value   • Protime 01/14/2022 24.5    • INR 01/14/2022 2.35      Recent labs reviewed and interpreted for clinical significance and application            Level of Care:           Hernesto Berger MD  07/01/22  .

## 2022-07-14 DIAGNOSIS — Z79.01 WARFARIN THERAPY STARTED: Primary | ICD-10-CM

## 2022-07-15 ENCOUNTER — LAB (OUTPATIENT)
Dept: FAMILY MEDICINE CLINIC | Facility: CLINIC | Age: 77
End: 2022-07-15

## 2022-07-15 DIAGNOSIS — Z79.01 WARFARIN THERAPY STARTED: ICD-10-CM

## 2022-07-15 LAB
INR PPP: 1.64 (ref 2–3)
PROTHROMBIN TIME: 16.4 SECONDS (ref 19.4–28.5)

## 2022-07-15 PROCEDURE — 85610 PROTHROMBIN TIME: CPT | Performed by: FAMILY MEDICINE

## 2022-07-15 PROCEDURE — 36415 COLL VENOUS BLD VENIPUNCTURE: CPT

## 2022-07-25 ENCOUNTER — HOSPITAL ENCOUNTER (OUTPATIENT)
Dept: CARDIOLOGY | Facility: HOSPITAL | Age: 77
Discharge: HOME | End: 2022-07-25
Admitting: INTERNAL MEDICINE

## 2022-07-25 VITALS
DIASTOLIC BLOOD PRESSURE: 79 MMHG | WEIGHT: 293 LBS | SYSTOLIC BLOOD PRESSURE: 124 MMHG | BODY MASS INDEX: 45.99 KG/M2 | HEIGHT: 67 IN

## 2022-07-25 DIAGNOSIS — R60.0 LOWER EXTREMITY EDEMA: ICD-10-CM

## 2022-07-25 DIAGNOSIS — R06.09 DOE (DYSPNEA ON EXERTION): ICD-10-CM

## 2022-07-25 LAB
BH CV ECHO MEAS - ACS: 1.99 CM
BH CV ECHO MEAS - AO MAX PG: 7.2 MMHG
BH CV ECHO MEAS - AO MEAN PG: 4.3 MMHG
BH CV ECHO MEAS - AO ROOT DIAM: 3.3 CM
BH CV ECHO MEAS - AO V2 MAX: 134.4 CM/SEC
BH CV ECHO MEAS - AO V2 VTI: 29.2 CM
BH CV ECHO MEAS - AVA(I,D): 2.8 CM2
BH CV ECHO MEAS - EDV(CUBED): 129.9 ML
BH CV ECHO MEAS - EDV(MOD-SP4): 84.9 ML
BH CV ECHO MEAS - EF(MOD-BP): 58 %
BH CV ECHO MEAS - EF(MOD-SP4): 57.7 %
BH CV ECHO MEAS - ESV(CUBED): 26.4 ML
BH CV ECHO MEAS - ESV(MOD-SP4): 35.9 ML
BH CV ECHO MEAS - FS: 41.2 %
BH CV ECHO MEAS - IVS/LVPW: 0.75 CM
BH CV ECHO MEAS - IVSD: 0.97 CM
BH CV ECHO MEAS - LA A2CS (ATRIAL LENGTH): 3.9 CM
BH CV ECHO MEAS - LV DIASTOLIC VOL/BSA (35-75): 35.5 CM2
BH CV ECHO MEAS - LV MASS(C)D: 219.3 GRAMS
BH CV ECHO MEAS - LV MAX PG: 6.5 MMHG
BH CV ECHO MEAS - LV MEAN PG: 3.8 MMHG
BH CV ECHO MEAS - LV SYSTOLIC VOL/BSA (12-30): 15 CM2
BH CV ECHO MEAS - LV V1 MAX: 127.3 CM/SEC
BH CV ECHO MEAS - LV V1 VTI: 28.4 CM
BH CV ECHO MEAS - LVIDD: 5.1 CM
BH CV ECHO MEAS - LVIDS: 3 CM
BH CV ECHO MEAS - LVOT AREA: 2.9 CM2
BH CV ECHO MEAS - LVOT DIAM: 1.93 CM
BH CV ECHO MEAS - LVPWD: 1.29 CM
BH CV ECHO MEAS - MV A MAX VEL: 59.8 CM/SEC
BH CV ECHO MEAS - MV DEC SLOPE: 831.7 CM/SEC2
BH CV ECHO MEAS - MV DEC TIME: 0.16 MSEC
BH CV ECHO MEAS - MV E MAX VEL: 133.7 CM/SEC
BH CV ECHO MEAS - MV E/A: 2.24
BH CV ECHO MEAS - MV MAX PG: 7.4 MMHG
BH CV ECHO MEAS - MV MEAN PG: 2.6 MMHG
BH CV ECHO MEAS - MV V2 VTI: 32.5 CM
BH CV ECHO MEAS - MVA(VTI): 2.6 CM2
BH CV ECHO MEAS - PA ACC TIME: 0.12 SEC
BH CV ECHO MEAS - PA PR(ACCEL): 25 MMHG
BH CV ECHO MEAS - PA V2 MAX: 91.2 CM/SEC
BH CV ECHO MEAS - PULM A REVS DUR: 0.09 SEC
BH CV ECHO MEAS - PULM A REVS VEL: 19.7 CM/SEC
BH CV ECHO MEAS - PULM DIAS VEL: 73.7 CM/SEC
BH CV ECHO MEAS - PULM S/D: 1
BH CV ECHO MEAS - PULM SYS VEL: 73.4 CM/SEC
BH CV ECHO MEAS - RAP SYSTOLE: 3 MMHG
BH CV ECHO MEAS - RV MAX PG: 2.7 MMHG
BH CV ECHO MEAS - RV V1 MAX: 81.8 CM/SEC
BH CV ECHO MEAS - RV V1 VTI: 21.2 CM
BH CV ECHO MEAS - RVDD: 3.6 CM
BH CV ECHO MEAS - RVSP: 36.6 MMHG
BH CV ECHO MEAS - SI(MOD-SP4): 20.5 ML/M2
BH CV ECHO MEAS - SV(LVOT): 83 ML
BH CV ECHO MEAS - SV(MOD-SP4): 49 ML
BH CV ECHO MEAS - TR MAX PG: 33.6 MMHG
BH CV ECHO MEAS - TR MAX VEL: 283.4 CM/SEC
MAXIMAL PREDICTED HEART RATE: 142 BPM
STRESS TARGET HR: 121 BPM

## 2022-07-25 PROCEDURE — 93306 TTE W/DOPPLER COMPLETE: CPT | Performed by: INTERNAL MEDICINE

## 2022-07-25 PROCEDURE — 93306 TTE W/DOPPLER COMPLETE: CPT

## 2022-07-28 RX ORDER — WARFARIN SODIUM 6 MG/1
6 TABLET ORAL DAILY
Qty: 90 TABLET | Refills: 0 | Status: SHIPPED | OUTPATIENT
Start: 2022-07-28 | End: 2022-10-21

## 2022-08-15 ENCOUNTER — LAB (OUTPATIENT)
Dept: FAMILY MEDICINE CLINIC | Facility: CLINIC | Age: 77
End: 2022-08-15

## 2022-08-15 DIAGNOSIS — Z79.01 WARFARIN THERAPY STARTED: ICD-10-CM

## 2022-08-15 LAB
INR PPP: 2.22 (ref 2–3)
PROTHROMBIN TIME: 21.9 SECONDS (ref 19.4–28.5)

## 2022-08-15 PROCEDURE — 85610 PROTHROMBIN TIME: CPT | Performed by: FAMILY MEDICINE

## 2022-08-15 PROCEDURE — 36415 COLL VENOUS BLD VENIPUNCTURE: CPT

## 2022-08-25 ENCOUNTER — TELEPHONE (OUTPATIENT)
Dept: FAMILY MEDICINE CLINIC | Facility: CLINIC | Age: 77
End: 2022-08-25

## 2022-08-25 NOTE — TELEPHONE ENCOUNTER
Caller: Arin Singh    Relationship to patient: Self    Best call back number: [phone]    Chief complaint: RESCHEDULE MEDICARE WELLNESS     Type of visit: MEDICARE WELLNESS     Requested date: ANYTIME SOONER THAN MARCH 2023     If rescheduling, when is the original appointment: 12/16     Additional notes:REQUESTED CALLBACK TO SEE IF HE HAS ANYTHING SOONER THAN MARCH 2023

## 2022-09-12 RX ORDER — DRONEDARONE 400 MG/1
TABLET, FILM COATED ORAL
Qty: 180 TABLET | Refills: 3 | Status: SHIPPED | OUTPATIENT
Start: 2022-09-12 | End: 2022-12-27

## 2022-09-15 ENCOUNTER — LAB (OUTPATIENT)
Dept: FAMILY MEDICINE CLINIC | Facility: CLINIC | Age: 77
End: 2022-09-15

## 2022-09-15 DIAGNOSIS — Z79.01 WARFARIN THERAPY STARTED: ICD-10-CM

## 2022-09-15 LAB
INR PPP: 3.02 (ref 2–3)
PROTHROMBIN TIME: 29.2 SECONDS (ref 19.4–28.5)

## 2022-09-15 PROCEDURE — 36415 COLL VENOUS BLD VENIPUNCTURE: CPT

## 2022-09-15 PROCEDURE — 85610 PROTHROMBIN TIME: CPT | Performed by: FAMILY MEDICINE

## 2022-10-17 ENCOUNTER — LAB (OUTPATIENT)
Dept: FAMILY MEDICINE CLINIC | Facility: CLINIC | Age: 77
End: 2022-10-17

## 2022-10-17 ENCOUNTER — CLINICAL SUPPORT (OUTPATIENT)
Dept: FAMILY MEDICINE CLINIC | Facility: CLINIC | Age: 77
End: 2022-10-17

## 2022-10-17 DIAGNOSIS — Z23 NEED FOR INFLUENZA VACCINATION: Primary | ICD-10-CM

## 2022-10-17 DIAGNOSIS — Z79.01 WARFARIN THERAPY STARTED: ICD-10-CM

## 2022-10-17 LAB
INR PPP: 2.56 (ref 2–3)
PROTHROMBIN TIME: 25 SECONDS (ref 19.4–28.5)

## 2022-10-17 PROCEDURE — 36415 COLL VENOUS BLD VENIPUNCTURE: CPT

## 2022-10-17 PROCEDURE — 85610 PROTHROMBIN TIME: CPT | Performed by: FAMILY MEDICINE

## 2022-10-17 PROCEDURE — 90662 IIV NO PRSV INCREASED AG IM: CPT | Performed by: NURSE PRACTITIONER

## 2022-10-17 PROCEDURE — G0008 ADMIN INFLUENZA VIRUS VAC: HCPCS | Performed by: NURSE PRACTITIONER

## 2022-10-20 PROCEDURE — 93296 REM INTERROG EVL PM/IDS: CPT | Performed by: NURSE PRACTITIONER

## 2022-10-20 PROCEDURE — 93294 REM INTERROG EVL PM/LDLS PM: CPT | Performed by: NURSE PRACTITIONER

## 2022-10-21 RX ORDER — WARFARIN SODIUM 6 MG/1
6 TABLET ORAL DAILY
Qty: 90 TABLET | Refills: 3 | Status: SHIPPED | OUTPATIENT
Start: 2022-10-21

## 2022-11-17 ENCOUNTER — LAB (OUTPATIENT)
Dept: FAMILY MEDICINE CLINIC | Facility: CLINIC | Age: 77
End: 2022-11-17

## 2022-11-17 DIAGNOSIS — Z79.01 WARFARIN THERAPY STARTED: ICD-10-CM

## 2022-11-17 LAB
INR PPP: 2.52 (ref 2–3)
PROTHROMBIN TIME: 24.6 SECONDS (ref 19.4–28.5)

## 2022-11-17 PROCEDURE — 36415 COLL VENOUS BLD VENIPUNCTURE: CPT

## 2022-11-17 PROCEDURE — 85610 PROTHROMBIN TIME: CPT | Performed by: FAMILY MEDICINE

## 2022-12-20 ENCOUNTER — LAB (OUTPATIENT)
Dept: FAMILY MEDICINE CLINIC | Facility: CLINIC | Age: 77
End: 2022-12-20

## 2022-12-20 ENCOUNTER — OFFICE VISIT (OUTPATIENT)
Dept: FAMILY MEDICINE CLINIC | Facility: CLINIC | Age: 77
End: 2022-12-20

## 2022-12-20 VITALS
RESPIRATION RATE: 18 BRPM | SYSTOLIC BLOOD PRESSURE: 128 MMHG | HEIGHT: 67 IN | DIASTOLIC BLOOD PRESSURE: 80 MMHG | HEART RATE: 71 BPM | WEIGHT: 293 LBS | BODY MASS INDEX: 45.99 KG/M2 | OXYGEN SATURATION: 98 %

## 2022-12-20 DIAGNOSIS — I48.0 PAROXYSMAL ATRIAL FIBRILLATION: ICD-10-CM

## 2022-12-20 DIAGNOSIS — M17.0 OSTEOARTHRITIS OF BOTH KNEES, UNSPECIFIED OSTEOARTHRITIS TYPE: ICD-10-CM

## 2022-12-20 DIAGNOSIS — E55.9 VITAMIN D DEFICIENCY, UNSPECIFIED: ICD-10-CM

## 2022-12-20 DIAGNOSIS — Z00.00 MEDICARE ANNUAL WELLNESS VISIT, SUBSEQUENT: Primary | ICD-10-CM

## 2022-12-20 DIAGNOSIS — I49.5 SICK SINUS SYNDROME: ICD-10-CM

## 2022-12-20 DIAGNOSIS — I10 ESSENTIAL HYPERTENSION: ICD-10-CM

## 2022-12-20 DIAGNOSIS — G47.33 OSA (OBSTRUCTIVE SLEEP APNEA): ICD-10-CM

## 2022-12-20 LAB
25(OH)D3 SERPL-MCNC: 49.6 NG/ML (ref 30–100)
ALBUMIN SERPL-MCNC: 4.2 G/DL (ref 3.5–5.2)
ALBUMIN/GLOB SERPL: 1.2 G/DL
ALP SERPL-CCNC: 81 U/L (ref 39–117)
ALT SERPL W P-5'-P-CCNC: 8 U/L (ref 1–33)
ANION GAP SERPL CALCULATED.3IONS-SCNC: 8 MMOL/L (ref 5–15)
AST SERPL-CCNC: 20 U/L (ref 1–32)
BASOPHILS # BLD AUTO: 0.06 10*3/MM3 (ref 0–0.2)
BASOPHILS NFR BLD AUTO: 0.9 % (ref 0–1.5)
BILIRUB SERPL-MCNC: 0.5 MG/DL (ref 0–1.2)
BUN SERPL-MCNC: 25 MG/DL (ref 8–23)
BUN/CREAT SERPL: 18.5 (ref 7–25)
CALCIUM SPEC-SCNC: 9.7 MG/DL (ref 8.6–10.5)
CHLORIDE SERPL-SCNC: 105 MMOL/L (ref 98–107)
CHOLEST SERPL-MCNC: 182 MG/DL (ref 0–200)
CO2 SERPL-SCNC: 26 MMOL/L (ref 22–29)
CREAT SERPL-MCNC: 1.35 MG/DL (ref 0.57–1)
DEPRECATED RDW RBC AUTO: 44.7 FL (ref 37–54)
EGFRCR SERPLBLD CKD-EPI 2021: 40.1 ML/MIN/1.73
EOSINOPHIL # BLD AUTO: 0.35 10*3/MM3 (ref 0–0.4)
EOSINOPHIL NFR BLD AUTO: 5.5 % (ref 0.3–6.2)
ERYTHROCYTE [DISTWIDTH] IN BLOOD BY AUTOMATED COUNT: 12.5 % (ref 12.3–15.4)
GLOBULIN UR ELPH-MCNC: 3.4 GM/DL
GLUCOSE SERPL-MCNC: 101 MG/DL (ref 65–99)
HBA1C MFR BLD: 5.2 % (ref 3.5–5.6)
HCT VFR BLD AUTO: 45.3 % (ref 34–46.6)
HDLC SERPL-MCNC: 59 MG/DL (ref 40–60)
HGB BLD-MCNC: 14.6 G/DL (ref 12–15.9)
IMM GRANULOCYTES # BLD AUTO: 0.01 10*3/MM3 (ref 0–0.05)
IMM GRANULOCYTES NFR BLD AUTO: 0.2 % (ref 0–0.5)
INR PPP: 3.55 (ref 2–3)
LDLC SERPL CALC-MCNC: 108 MG/DL (ref 0–100)
LDLC/HDLC SERPL: 1.82 {RATIO}
LYMPHOCYTES # BLD AUTO: 2.38 10*3/MM3 (ref 0.7–3.1)
LYMPHOCYTES NFR BLD AUTO: 37.1 % (ref 19.6–45.3)
MCH RBC QN AUTO: 31.1 PG (ref 26.6–33)
MCHC RBC AUTO-ENTMCNC: 32.2 G/DL (ref 31.5–35.7)
MCV RBC AUTO: 96.6 FL (ref 79–97)
MONOCYTES # BLD AUTO: 0.65 10*3/MM3 (ref 0.1–0.9)
MONOCYTES NFR BLD AUTO: 10.1 % (ref 5–12)
NEUTROPHILS NFR BLD AUTO: 2.96 10*3/MM3 (ref 1.7–7)
NEUTROPHILS NFR BLD AUTO: 46.2 % (ref 42.7–76)
NRBC BLD AUTO-RTO: 0 /100 WBC (ref 0–0.2)
PLATELET # BLD AUTO: 191 10*3/MM3 (ref 140–450)
PMV BLD AUTO: 12.2 FL (ref 6–12)
POTASSIUM SERPL-SCNC: 4.4 MMOL/L (ref 3.5–5.2)
PROT SERPL-MCNC: 7.6 G/DL (ref 6–8.5)
PROTHROMBIN TIME: 34 SECONDS (ref 19.4–28.5)
RBC # BLD AUTO: 4.69 10*6/MM3 (ref 3.77–5.28)
SODIUM SERPL-SCNC: 139 MMOL/L (ref 136–145)
T4 FREE SERPL-MCNC: 1.43 NG/DL (ref 0.93–1.7)
TRIGL SERPL-MCNC: 79 MG/DL (ref 0–150)
TSH SERPL DL<=0.05 MIU/L-ACNC: 1.34 UIU/ML (ref 0.27–4.2)
VIT B12 BLD-MCNC: 316 PG/ML (ref 211–946)
VLDLC SERPL-MCNC: 15 MG/DL (ref 5–40)
WBC NRBC COR # BLD: 6.41 10*3/MM3 (ref 3.4–10.8)

## 2022-12-20 PROCEDURE — 80053 COMPREHEN METABOLIC PANEL: CPT | Performed by: FAMILY MEDICINE

## 2022-12-20 PROCEDURE — 85025 COMPLETE CBC W/AUTO DIFF WBC: CPT | Performed by: FAMILY MEDICINE

## 2022-12-20 PROCEDURE — 36415 COLL VENOUS BLD VENIPUNCTURE: CPT | Performed by: FAMILY MEDICINE

## 2022-12-20 PROCEDURE — 82306 VITAMIN D 25 HYDROXY: CPT | Performed by: FAMILY MEDICINE

## 2022-12-20 PROCEDURE — 83036 HEMOGLOBIN GLYCOSYLATED A1C: CPT | Performed by: FAMILY MEDICINE

## 2022-12-20 PROCEDURE — 80061 LIPID PANEL: CPT | Performed by: FAMILY MEDICINE

## 2022-12-20 PROCEDURE — 1170F FXNL STATUS ASSESSED: CPT | Performed by: FAMILY MEDICINE

## 2022-12-20 PROCEDURE — 82607 VITAMIN B-12: CPT | Performed by: FAMILY MEDICINE

## 2022-12-20 PROCEDURE — 85610 PROTHROMBIN TIME: CPT | Performed by: FAMILY MEDICINE

## 2022-12-20 PROCEDURE — G0439 PPPS, SUBSEQ VISIT: HCPCS | Performed by: FAMILY MEDICINE

## 2022-12-20 PROCEDURE — 99213 OFFICE O/P EST LOW 20 MIN: CPT | Performed by: FAMILY MEDICINE

## 2022-12-20 PROCEDURE — 84439 ASSAY OF FREE THYROXINE: CPT | Performed by: FAMILY MEDICINE

## 2022-12-20 PROCEDURE — 84443 ASSAY THYROID STIM HORMONE: CPT | Performed by: FAMILY MEDICINE

## 2022-12-20 PROCEDURE — 1160F RVW MEDS BY RX/DR IN RCRD: CPT | Performed by: FAMILY MEDICINE

## 2022-12-20 NOTE — PROGRESS NOTES
The ABCs of the Annual Wellness Visit  Subsequent Medicare Wellness Visit    Subjective    Arin Singh is a 77 y.o. female who presents for a Subsequent Medicare Wellness Visit.    The following portions of the patient's history were reviewed and   updated as appropriate: allergies, current medications, past family history, past medical history, past social history, past surgical history and problem list.    Compared to one year ago, the patient feels her physical   health is worse.    Compared to one year ago, the patient feels her mental   health is better.    Recent Hospitalizations:  She was not admitted to the hospital during the last year.     Current Medical Providers:  Patient Care Team:  Albin Guillen MD as PCP - General (Internal Medicine)    Outpatient Medications Prior to Visit   Medication Sig Dispense Refill   • lisinopril-hydrochlorothiazide (PRINZIDE,ZESTORETIC) 20-12.5 MG per tablet TAKE 1 TABLET BY MOUTH  DAILY 90 tablet 3   • metoprolol succinate XL (TOPROL-XL) 25 MG 24 hr tablet TAKE 1 TABLET BY MOUTH  TWICE DAILY 180 tablet 3   • verapamil SR (CALAN-SR) 240 MG CR tablet TAKE 1 TABLET BY MOUTH  DAILY 90 tablet 3   • warfarin (COUMADIN) 6 MG tablet TAKE 1 TABLET BY MOUTH  DAILY AS DIRECTED 90 tablet 3   • Multaq 400 MG tablet TAKE 1 TABLET BY MOUTH  TWICE DAILY WITH MEALS 180 tablet 3     No facility-administered medications prior to visit.     No opioid medication identified on active medication list. I have reviewed chart for other potential  high risk medication/s and harmful drug interactions in the elderly.        Aspirin is not on active medication list.  Aspirin use is not indicated based on review of current medical condition/s. Risk of harm outweighs potential benefits.  .    Patient Active Problem List   Diagnosis   • Sick sinus syndrome (HCC)   • Presence of cardiac pacemaker   • Absolute anemia   • Coagulation factor deficiency syndrome (HCC)   • Hyperlipidemia, mixed   •  "Essential hypertension   • Osteoarthritis of knee   • Paroxysmal atrial fibrillation (HCC)   • Vitamin B12 deficiency   • Vitamin D deficiency   • Warfarin therapy started   • Screening for breast cancer   • Osteopenia     Advance Care Planning  Advance Directive is on file.  ACP discussion was held with the patient during this visit. Patient has an advance directive in EMR which is still valid.      Objective    Vitals:    12/20/22 0842   BP: 128/80   BP Location: Left arm   Patient Position: Sitting   Pulse: 71   Resp: 18   SpO2: 98%   Weight: 136 kg (300 lb)   Height: 170.2 cm (67.01\")     Estimated body mass index is 46.98 kg/m² as calculated from the following:    Height as of this encounter: 170.2 cm (67.01\").    Weight as of this encounter: 136 kg (300 lb).    Class 3 Severe Obesity (BMI >=40). Obesity-related health conditions include the following: obstructive sleep apnea, hypertension and osteoarthritis. Obesity is unchanged. BMI is is above average; BMI management plan is completed. We discussed portion control and increasing exercise.    Does the patient have evidence of cognitive impairment? No  Lab Results   Component Value Date    TRIG 79 12/20/2022    HDL 59 12/20/2022     (H) 12/20/2022    VLDL 15 12/20/2022    HGBA1C 5.2 12/20/2022        HEALTH RISK ASSESSMENT    Smoking Status:  Social History     Tobacco Use   Smoking Status Never   Smokeless Tobacco Never     Alcohol Consumption:  Social History     Substance and Sexual Activity   Alcohol Use No   Rare     Fall Risk Screen:  STEADI Fall Risk Assessment was completed, and patient is at LOW risk for falls.Assessment completed on:12/20/2022    Depression Screening:  PHQ-2/PHQ-9 Depression Screening 12/20/2022   Retired PHQ-9 Total Score -   Retired Total Score -   Little Interest or Pleasure in Doing Things 0-->not at all   Feeling Down, Depressed or Hopeless 0-->not at all   PHQ-9: Brief Depression Severity Measure Score 0     Health " Habits and Functional and Cognitive Screening:  Functional & Cognitive Status 12/20/2022   Do you have difficulty preparing food and eating? No   Do you have difficulty bathing yourself, getting dressed or grooming yourself? No   Do you have difficulty using the toilet? No   Do you have difficulty moving around from place to place? No   Do you have trouble with steps or getting out of a bed or a chair? No   Current Diet Well Balanced Diet   Dental Exam Up to date   Eye Exam Up to date   Exercise (times per week) 1 times per week   Current Exercises Include -   Current Exercise Activities Include -   Do you need help using the phone?  No   Are you deaf or do you have serious difficulty hearing?  No   Do you need help with transportation? No   Do you need help shopping? No   Do you need help preparing meals?  No   Do you need help with housework?  No   Do you need help with laundry? No   Do you need help taking your medications? No   Do you need help managing money? No   Do you ever drive or ride in a car without wearing a seat belt? No   Have you felt unusual stress, anger or loneliness in the last month? No   Who do you live with? Alone   If you need help, do you have trouble finding someone available to you? No   Have you been bothered in the last four weeks by sexual problems? No   Do you have difficulty concentrating, remembering or making decisions? No       Age-appropriate Screening Schedule:  Refer to the list below for future screening recommendations based on patient's age, sex and/or medical conditions. Orders for these recommended tests are listed in the plan section. The patient has been provided with a written plan.    Health Maintenance   Topic Date Due   • TDAP/TD VACCINES (1 - Tdap) Never done   • ZOSTER VACCINE (2 of 3) 01/30/2015   • LIPID PANEL  12/20/2023   • DXA SCAN  03/02/2024   • INFLUENZA VACCINE  Completed          CMS Preventative Services Quick Reference  Risk Factors Identified During  Encounter  Immunizations Discussed/Encouraged: Tdap  The above risks/problems have been discussed with the patient.  Pertinent information has been shared with the patient in the After Visit Summary.  An After Visit Summary and PPPS were made available to the patient.    Preventative  Colonoscopy: 2014, potentially due 2024  Mammogram: 12/2021, declined further  Pap smear: aged out  DEXA: 12/2021- osteopenia. Taking Ca/VitD daily  Shingles: Zostavax 12/2014  Pneumonia: Prevnar 10/2015, Pneumovax 12/2020  Tdap: Recommended, declined  Influenza: 10/2022  COVID: Completed 3 shots Pfizer (10/2021); counseled regarding booster    Follow Up:   Next Medicare Wellness visit to be scheduled in 1 year.       Additional E&M Note during same encounter follows:  Patient has multiple medical problems which are significant and separately identifiable that require additional work above and beyond the Medicare Wellness Visit.      Chief Complaint  Medicare Wellness-subsequent    Subjective        HPI  Arin Singh is also being seen today for multiple medical problems    PAF: maintained on Multaq and has resulted in reducing Afib episodes. Denies palpitations. Asymptomatic w/ afib. S/p pacemaker. Also maintained on metoprolol succinate 25 BID, verapamil 240 daily. Warfarin regimen is 6mg daily except one day she takes 7.5mg. Follows w/ DC Berger.     SSS: s/p pacemaker. Does report some fatigue but still very active. Follows w/ DC Berger     HTN: 128/80 today. Maintained on Prinzide 20/12.5, metoprolol 25 BID, and verapamil 240 dailiy. No LH/dizziness, CP. Reports SOB w/ activity/exertion     OA: s/p R knee replacement and L knee bothers her most. Not taking any medication as Voltaren gel did not seem to help. Never had steroid shot. Not interested in this or further surgery.    YESY: now maintained on CPAP. Doesn't like the idea of CPAP but does admit to sleeping better w/ CPAP. Overall, happy w/ current control    Review of  "Systems   Constitutional: Positive for fatigue. Negative for chills and fever.   HENT: Negative for congestion and rhinorrhea.    Eyes: Negative for visual disturbance.   Respiratory: Positive for shortness of breath. Negative for cough.    Cardiovascular: Negative for chest pain and palpitations.   Gastrointestinal: Negative for abdominal pain and vomiting.   Genitourinary: Negative for difficulty urinating and dysuria.   Musculoskeletal: Positive for arthralgias. Negative for back pain.   Skin: Negative for rash.   Neurological: Negative for dizziness and light-headedness.   Psychiatric/Behavioral: Negative for dysphoric mood and sleep disturbance. The patient is not nervous/anxious.      Objective   Vital Signs:  /80 (BP Location: Left arm, Patient Position: Sitting)   Pulse 71   Resp 18   Ht 170.2 cm (67.01\")   Wt 136 kg (300 lb)   SpO2 98%   BMI 46.98 kg/m²     Physical Exam  Constitutional:       General: She is not in acute distress.     Appearance: She is well-developed. She is obese.   HENT:      Head: Normocephalic and atraumatic.      Right Ear: Tympanic membrane and external ear normal. There is no impacted cerumen.      Left Ear: Tympanic membrane and external ear normal. There is no impacted cerumen.      Nose: Nose normal.      Mouth/Throat:      Mouth: Mucous membranes are moist.      Pharynx: No oropharyngeal exudate or posterior oropharyngeal erythema.   Eyes:      General: No scleral icterus.        Right eye: No discharge.         Left eye: No discharge.      Extraocular Movements: Extraocular movements intact.      Conjunctiva/sclera: Conjunctivae normal.      Pupils: Pupils are equal, round, and reactive to light.   Neck:      Thyroid: No thyromegaly.      Vascular: No carotid bruit.   Cardiovascular:      Rate and Rhythm: Normal rate and regular rhythm.      Heart sounds: Normal heart sounds. No murmur heard.     Comments: Distant HS  Pulmonary:      Effort: Pulmonary effort is " normal. No respiratory distress.      Breath sounds: Normal breath sounds. No wheezing or rales.   Abdominal:      General: Bowel sounds are normal. There is no distension.      Palpations: Abdomen is soft.      Tenderness: There is no abdominal tenderness.   Musculoskeletal:         General: Swelling (trace) present. No deformity. Normal range of motion.      Cervical back: Normal range of motion and neck supple.   Lymphadenopathy:      Cervical: No cervical adenopathy.   Skin:     General: Skin is warm and dry.      Findings: No rash.   Neurological:      General: No focal deficit present.      Mental Status: She is alert and oriented to person, place, and time. Mental status is at baseline.      Cranial Nerves: No cranial nerve deficit.      Sensory: No sensory deficit.   Psychiatric:         Behavior: Behavior normal.         Thought Content: Thought content normal.               Assessment and Plan   Diagnoses and all orders for this visit:    1. Medicare annual wellness visit, subsequent (Primary)  -     CBC & Differential  -     Comprehensive Metabolic Panel  -     Hemoglobin A1c  -     Lipid Panel  -     TSH  -     T4, Free  -     Vitamin D,25-Hydroxy  -     Vitamin B12  -     Protime-INR  -  Counseled regarding diet, exercise, weight loss, and preventative health maintenance items/immunizations below    2. Paroxysmal atrial fibrillation (HCC)   -Continue home metoprolol succinate 25 BID and verapamil 240 daily.    -Patient to discuss discontinuation of Multaq with cardiology    -Continue warfarin 6mg daily except 7.5mg 1 day/week    3. Sick sinus syndrome (HCC): Status post pacemaker placement   -Continue cardiology follow-up    4. Essential hypertension: 128/80 today  -     Comprehensive Metabolic Panel  - Continue home Prinzide 20/12.5 daily, metoprolol 25 daily, verapamil 240 daily    5. Osteoarthritis of both knees, unspecified osteoarthritis type: Status post left knee replacement.  Not interested in  additional evaluation or management of osteoarthritis at this time   -Consider Tylenol or Biofreeze as needed    6. YESY (obstructive sleep apnea)   -Continue home CPAP    7. Vitamin D deficiency, unspecified  -     Vitamin D,25-Hydroxy    Consider lasix given sob and 7/2022 echo w/ elevated RVSP       Follow Up   Return in about 1 year (around 12/20/2023) for Medicare Wellness.  Patient was given instructions and counseling regarding her condition or for health maintenance advice. Please see specific information pulled into the AVS if appropriate.

## 2022-12-20 NOTE — PROGRESS NOTES
"The ABCs of the Annual Wellness Visit  Subsequent Medicare Wellness Visit    Subjective    {Wrapup  Review (Popup)  Advance Care Planning  Labs  CC  Problem List  Visit Diagnosis  Medications  Result Review  Imaging  [facility]  [facility]  [facility]  Snap[employer]ot  Encounters  Notes  Media  Procedures :23}  Arin Singh is a 77 y.o. female who presents for a Subsequent Medicare Wellness Visit.    The following portions of the patient's history were reviewed and   updated as appropriate: {history reviewed:20406::\"allergies\",\"current medications\",\"past family history\",\"past medical history\",\"past social history\",\"past surgical history\",\"problem list\"}.    Compared to one year ago, the patient feels her physical   health is worse.    Compared to one year ago, the patient feels her mental   health is better.    Recent Hospitalizations:  She was not admitted to the hospital during the last year.       Current Medical Providers:  Patient Care Team:  Albin Guillen MD as PCP - General (Internal Medicine)    Outpatient Medications Prior to Visit   Medication Sig Dispense Refill   • lisinopril-hydrochlorothiazide (PRINZIDE,ZESTORETIC) 20-12.5 MG per tablet TAKE 1 TABLET BY MOUTH  DAILY 90 tablet 3   • metoprolol succinate XL (TOPROL-XL) 25 MG 24 hr tablet TAKE 1 TABLET BY MOUTH  TWICE DAILY 180 tablet 3   • Multaq 400 MG tablet TAKE 1 TABLET BY MOUTH  TWICE DAILY WITH MEALS 180 tablet 3   • verapamil SR (CALAN-SR) 240 MG CR tablet TAKE 1 TABLET BY MOUTH  DAILY 90 tablet 3   • warfarin (COUMADIN) 6 MG tablet TAKE 1 TABLET BY MOUTH  DAILY AS DIRECTED 90 tablet 3     No facility-administered medications prior to visit.       No opioid medication identified on active medication list. I have reviewed chart for other potential  high risk medication/s and harmful drug interactions in the elderly.          Aspirin is not on active medication list.  Aspirin use is not indicated based on " "review of current medical condition/s. Risk of harm outweighs potential benefits.  .    Patient Active Problem List   Diagnosis   • Sick sinus syndrome (HCC)   • Presence of cardiac pacemaker   • Absolute anemia   • Coagulation factor deficiency syndrome (HCC)   • Hyperlipidemia, mixed   • Essential hypertension   • Osteoarthritis of knee   • Paroxysmal atrial fibrillation (HCC)   • Vitamin B12 deficiency   • Vitamin D deficiency   • Warfarin therapy started   • Screening for breast cancer   • Osteopenia     Advance Care Planning  Advance Directive is on file.  ACP discussion was held with the patient during this visit. Patient has an advance directive in EMR which is still valid.      Objective    Vitals:    12/20/22 0842   BP: 128/80   BP Location: Left arm   Patient Position: Sitting   Pulse: 71   Resp: 18   SpO2: 98%   Weight: 136 kg (300 lb)   Height: 170.2 cm (67.01\")     Estimated body mass index is 46.98 kg/m² as calculated from the following:    Height as of this encounter: 170.2 cm (67.01\").    Weight as of this encounter: 136 kg (300 lb).    {Class 3 Severe Obesity (BMI >=40).:0215072140}      Does the patient have evidence of cognitive impairment?   {Yes/No:90232}            HEALTH RISK ASSESSMENT    Smoking Status:  Social History     Tobacco Use   Smoking Status Never   Smokeless Tobacco Never     Alcohol Consumption:  Social History     Substance and Sexual Activity   Alcohol Use No     Fall Risk Screen:    STEADI Fall Risk Assessment was completed, and patient is at LOW risk for falls.Assessment completed on:12/20/2022    Depression Screening:  PHQ-2/PHQ-9 Depression Screening 12/20/2022   Retired PHQ-9 Total Score -   Retired Total Score -   Little Interest or Pleasure in Doing Things 0-->not at all   Feeling Down, Depressed or Hopeless 0-->not at all   PHQ-9: Brief Depression Severity Measure Score 0       Health Habits and Functional and Cognitive Screening:  Functional & Cognitive Status " 12/20/2022   Do you have difficulty preparing food and eating? No   Do you have difficulty bathing yourself, getting dressed or grooming yourself? No   Do you have difficulty using the toilet? No   Do you have difficulty moving around from place to place? No   Do you have trouble with steps or getting out of a bed or a chair? No   Current Diet Well Balanced Diet   Dental Exam Up to date   Eye Exam Up to date   Exercise (times per week) 1 times per week   Current Exercises Include -   Current Exercise Activities Include -   Do you need help using the phone?  No   Are you deaf or do you have serious difficulty hearing?  No   Do you need help with transportation? No   Do you need help shopping? No   Do you need help preparing meals?  No   Do you need help with housework?  No   Do you need help with laundry? No   Do you need help taking your medications? No   Do you need help managing money? No   Do you ever drive or ride in a car without wearing a seat belt? No   Have you felt unusual stress, anger or loneliness in the last month? No   Who do you live with? Alone   If you need help, do you have trouble finding someone available to you? No   Have you been bothered in the last four weeks by sexual problems? No   Do you have difficulty concentrating, remembering or making decisions? No       Age-appropriate Screening Schedule:  Refer to the list below for future screening recommendations based on patient's age, sex and/or medical conditions. Orders for these recommended tests are listed in the plan section. The patient has been provided with a written plan.    Health Maintenance   Topic Date Due   • TDAP/TD VACCINES (1 - Tdap) Never done   • ZOSTER VACCINE (2 of 3) 01/30/2015   • LIPID PANEL  05/13/2023   • DXA SCAN  03/02/2024   • INFLUENZA VACCINE  Completed                CMS Preventative Services Quick Reference  Risk Factors Identified During Encounter:    {Medicare Wellness Risk Factors:68387}    The above  risks/problems have been discussed with the patient.  Pertinent information has been shared with the patient in the After Visit Summary.    There are no diagnoses linked to this encounter.    Follow Up:   Next Medicare Wellness visit to be scheduled in 1 year.      An After Visit Summary and PPPS were made available to the patient.

## 2022-12-21 DIAGNOSIS — E53.8 B12 DEFICIENCY: ICD-10-CM

## 2022-12-21 DIAGNOSIS — Z79.01 WARFARIN THERAPY STARTED: Primary | ICD-10-CM

## 2022-12-21 DIAGNOSIS — I10 ESSENTIAL HYPERTENSION: ICD-10-CM

## 2022-12-27 ENCOUNTER — OFFICE VISIT (OUTPATIENT)
Dept: CARDIOLOGY | Facility: CLINIC | Age: 77
End: 2022-12-27

## 2022-12-27 VITALS
SYSTOLIC BLOOD PRESSURE: 129 MMHG | DIASTOLIC BLOOD PRESSURE: 76 MMHG | HEART RATE: 69 BPM | HEIGHT: 67 IN | BODY MASS INDEX: 45.99 KG/M2 | WEIGHT: 293 LBS | RESPIRATION RATE: 18 BRPM

## 2022-12-27 DIAGNOSIS — I48.0 PAROXYSMAL ATRIAL FIBRILLATION: Primary | ICD-10-CM

## 2022-12-27 DIAGNOSIS — Z95.0 PRESENCE OF CARDIAC PACEMAKER: ICD-10-CM

## 2022-12-27 PROCEDURE — 99214 OFFICE O/P EST MOD 30 MIN: CPT | Performed by: INTERNAL MEDICINE

## 2022-12-27 PROCEDURE — 93000 ELECTROCARDIOGRAM COMPLETE: CPT | Performed by: INTERNAL MEDICINE

## 2022-12-27 NOTE — PROGRESS NOTES
Cardiology Clinic Note  Hernesto Berger MD, PhD    Subjective:     Encounter Date:12/27/2022      Patient ID: Arin Singh is a 77 y.o. female.    Chief Complaint:  Chief Complaint   Patient presents with   • Follow-up       HPI:       I the pleasure to see this 76-year-old female today in follow-up,  she has a cardiac history of sick sinus syndrome status post pacemaker as well as tachybradycardia syndrome with paroxysmal atrial fibrillation on Coumadin therapy for thromboembolic disease with MTHFR gene mutation and again thromboembolic disease in the past remotely.  She was on anticoagulation when she first was diagnosed with atrial fibrillation.  She has no systolic heart failure with preserved LVEF, no chest pain or anginal symptoms.  She is not on antiarrhythmic therapy and is only on verapamil and metoprolol as rate and rhythm control strategy.  She does not wish to be on anything that requires hospitalization such as dofetilide, no wish for any ablation that would require hospitalization given Covid concerns.  EKG today reveals AV paced rhythm at 70, with chronic RV pacing and a high burden we will do 1 to make sure that she is not having any issues with chronic RV pacing and therefore will order 2D echo with increased pacing demand, she does have some lower extremity edema and chronic dyspnea on exertion which is likely multifactorial.  She is tolerating Multaq for atrial fibrillation.  She has no other complaints today.  She has controlled essential hypertension, stable Coumadin therapy with INR.     She presents back for follow-up.  Device interrogation today reveals much improved burden of atrial fibrillation  only 2 episode since prior interrogation lasting 1 hour  mostly in the middle of the night between 3 and 7 AM., she still remains on Coumadin with no bleeding.  Follow-up 2D echo revealed an EF is preserved 55% with moderate atrial enlargement bilaterally, mild valvular insufficiency only.  This is  despite chronic RV pacing her RV and LV continue to function normally with normal geometry.  She says she is on the problem Multaq and by the way that it makes her feel with shortness of breath and fatigue requesting get off this medicine which is fine from CV standpoint if she is well controlled on metoprolol and verapamil.     Review of systems otherwise negative x14 point review of systems except was mentioned above     Historical data copied forward from previous encounters in EMR is unchanged     Exam  Vitals reviewed below  Regular rate rhythm no rubs gallops no heave no lift 70  Obese soft nontender nondistended bowel sounds are positive  Pacemaker site clean and dry  Clear to auscultation  No clubbing cyanosis or edema  Pulses 2+ equal bilaterally  Unchanged from prior    Device interrogation by me reveals an AV paced rhythm, 54% atrially paced, 99% ventricularly paced  MRI compatible device Zouxiu  1 year battery life remaining     Assessment plan per my encounter  Sick sinus syndrome with presence of pacemaker  Paroxysmal A. fib RVR, stop Multaq, will try alternative antiarrhythmic if A. fib burden increases again after discontinuation of Multaq, may try sotalol  Essential hypertension  Chronic RV pacing 99%, EF normal 55 to 60%  Continue metoprolol and verapamil  LFTs are okay  Recheck INR   Multaq discontinue secondary to feeling poorly on the medicine  Resee back in 6 months s for A. fib burden on interrogation  Continue other antihypertensives  Continue Coumadin but adjust dosing per levels for INR 2-3  Device has not 1 year battery life, reinterrogate in 6 months     Back to clinic in 6 months     Hernesto Berger MD, PhD     Medicines reviewed          Historical data copied forward from previous encounters in EMR including the history, exam, and assessment/plan has been reviewed and is unchanged unless noted otherwise.    Cardiac medicines reviewed with risk, benefits, and necessity of each  "discussed.    Risk and benefit of cardiac testing reviewed including death heart attack stroke pain bleeding infection need for vascular /cardiovascular surgery were discussed and the patient     Objective:         /76 (BP Location: Right arm, Patient Position: Sitting)   Pulse 69   Resp 18   Ht 170.2 cm (67\")   Wt 135 kg (297 lb)   BMI 46.52 kg/m²         The pleasure to be involved in this patient's cardiovascular care.  Please call with any questions or concerns  Hernesto Berger MD, PhD    Most recent EKG as reviewed and interpreted by me:    ECG 12 Lead    Date/Time: 12/27/2022 1:34 PM  Performed by: Hernesto Berger MD  Authorized by: Hernesto Berger MD   Comparison: not compared with previous ECG   Previous ECG: no previous ECG available  Rhythm: paced  Rate: normal  Conduction: left bundle branch block  Pacing: dual chamber pacing  Clinical impression: abnormal EKG             Most recent echo as reviewed and interpreted by me:  Results for orders placed during the hospital encounter of 07/25/22    Adult Transthoracic Echo Complete W/ Cont if Necessary Per Protocol    Interpretation Summary  · Estimated right ventricular systolic pressure from tricuspid regurgitation is mildly elevated (35-45 mmHg).  · Mild pulmonary hypertension is present.  · Estimated left ventricular EF was in agreement with the calculated left ventricular EF. Left ventricular ejection fraction appears to be 56 - 60%. Left ventricular systolic function is normal.  · Left atrial volume is moderately increased.  · Left ventricular diastolic function is consistent with (grade II w/high LAP) pseudonormalization.      Most recent stress test as reviewed and interpreted by me:      Most recent cardiac catheterization as reviewed interpreted by me:  No results found for this or any previous visit.    The following portions of the patient's history were reviewed and updated as appropriate: allergies, current " medications, past family history, past medical history, past social history, past surgical history and problem list.      ROS:  14 point review of systems negative except as mentioned above    Current Outpatient Medications:   •  lisinopril-hydrochlorothiazide (PRINZIDE,ZESTORETIC) 20-12.5 MG per tablet, TAKE 1 TABLET BY MOUTH  DAILY, Disp: 90 tablet, Rfl: 3  •  metoprolol succinate XL (TOPROL-XL) 25 MG 24 hr tablet, TAKE 1 TABLET BY MOUTH  TWICE DAILY, Disp: 180 tablet, Rfl: 3  •  Multaq 400 MG tablet, TAKE 1 TABLET BY MOUTH  TWICE DAILY WITH MEALS, Disp: 180 tablet, Rfl: 3  •  verapamil SR (CALAN-SR) 240 MG CR tablet, TAKE 1 TABLET BY MOUTH  DAILY, Disp: 90 tablet, Rfl: 3  •  warfarin (COUMADIN) 6 MG tablet, TAKE 1 TABLET BY MOUTH  DAILY AS DIRECTED, Disp: 90 tablet, Rfl: 3  •  Cyanocobalamin 1000 MCG capsule, Take 1 capsule by mouth Daily., Disp: 90 capsule, Rfl: 1    Problem List:  Patient Active Problem List   Diagnosis   • Sick sinus syndrome (HCC)   • Presence of cardiac pacemaker   • Absolute anemia   • Coagulation factor deficiency syndrome (HCC)   • Hyperlipidemia, mixed   • Essential hypertension   • Osteoarthritis of knee   • Paroxysmal atrial fibrillation (HCC)   • Vitamin B12 deficiency   • Vitamin D deficiency   • Warfarin therapy started   • Screening for breast cancer   • Osteopenia     Past Medical History:  Past Medical History:   Diagnosis Date   • Coagulation factor deficiency syndrome (HCC) 6/4/2013   • Hyperlipidemia 9/12/2019   • Hypertension 9/12/2019   • Osteoarthritis of knee 10/7/2015   • Other pulmonary embolism without acute cor pulmonale (HCC) 3/8/2013   • Paroxysmal atrial fibrillation (HCC) 10/4/2017   • Presence of cardiac pacemaker 9/9/2019   • Sick sinus syndrome (HCC) 9/9/2019   • Vitamin B12 deficiency 11/2/2017   • Vitamin D deficiency 9/25/2018     Past Surgical History:  Past Surgical History:   Procedure Laterality Date   • INSERT / REPLACE / REMOVE PACEMAKER  2016    BS AV  Sequential   • REPLACEMENT TOTAL KNEE Right      Social History:  Social History     Socioeconomic History   • Marital status:    Tobacco Use   • Smoking status: Never   • Smokeless tobacco: Never   Substance and Sexual Activity   • Alcohol use: No   • Drug use: No   • Sexual activity: Defer     Allergies:  No Known Allergies  Immunizations:  Immunization History   Administered Date(s) Administered   • COVID-19 (PFIZER) PURPLE CAP 01/22/2021, 02/12/2021, 10/14/2021   • Fluad Quad 65+ 09/25/2020   • Flucelvax Quad Vial =>4yrs 10/18/2019   • Fluzone High Dose =>65 Years ([employer] ONLY) 10/07/2015, 10/18/2016, 09/22/2017, 09/25/2018   • Fluzone High-Dose 65+yrs 10/11/2021, 10/17/2022   • Influenza Whole 11/16/2005, 11/15/2006, 09/23/2009   • Influenza, Unspecified 10/18/2019   • Pneumococcal Conjugate 13-Valent (PCV13) 10/12/2015   • Pneumococcal Polysaccharide (PPSV23) 12/09/2020   • Zostavax 12/05/2014            In-Office Procedure(s):  No orders to display        ASCVD RIsk Score::  The 10-year ASCVD risk score (Mikey DK, et al., 2019) is: 32%    Values used to calculate the score:      Age: 77 years      Sex: Female      Is Non- : No      Diabetic: No      Tobacco smoker: No      Systolic Blood Pressure: 129 mmHg      Is BP treated: Yes      HDL Cholesterol: 59 mg/dL      Total Cholesterol: 182 mg/dL    Imaging:                 Lab Review:   Office Visit on 12/20/2022   Component Date Value   • Glucose 12/20/2022 101 (H)    • BUN 12/20/2022 25 (H)    • Creatinine 12/20/2022 1.35 (H)    • Sodium 12/20/2022 139    • Potassium 12/20/2022 4.4    • Chloride 12/20/2022 105    • CO2 12/20/2022 26.0    • Calcium 12/20/2022 9.7    • Total Protein 12/20/2022 7.6    • Albumin 12/20/2022 4.20    • ALT (SGPT) 12/20/2022 8    • AST (SGOT) 12/20/2022 20    • Alkaline Phosphatase 12/20/2022 81    • Total Bilirubin 12/20/2022 0.5    • Globulin 12/20/2022 3.4    • A/G Ratio 12/20/2022 1.2    •  BUN/Creatinine Ratio 12/20/2022 18.5    • Anion Gap 12/20/2022 8.0    • eGFR 12/20/2022 40.1 (L)    • Hemoglobin A1C 12/20/2022 5.2    • Total Cholesterol 12/20/2022 182    • Triglycerides 12/20/2022 79    • HDL Cholesterol 12/20/2022 59    • LDL Cholesterol  12/20/2022 108 (H)    • VLDL Cholesterol 12/20/2022 15    • LDL/HDL Ratio 12/20/2022 1.82    • TSH 12/20/2022 1.340    • Free T4 12/20/2022 1.43    • 25 Hydroxy, Vitamin D 12/20/2022 49.6    • Vitamin B-12 12/20/2022 316    • Protime 12/20/2022 34.0 (H)    • INR 12/20/2022 3.55 (H)    • WBC 12/20/2022 6.41    • RBC 12/20/2022 4.69    • Hemoglobin 12/20/2022 14.6    • Hematocrit 12/20/2022 45.3    • MCV 12/20/2022 96.6    • MCH 12/20/2022 31.1    • MCHC 12/20/2022 32.2    • RDW 12/20/2022 12.5    • RDW-SD 12/20/2022 44.7    • MPV 12/20/2022 12.2 (H)    • Platelets 12/20/2022 191    • Neutrophil % 12/20/2022 46.2    • Lymphocyte % 12/20/2022 37.1    • Monocyte % 12/20/2022 10.1    • Eosinophil % 12/20/2022 5.5    • Basophil % 12/20/2022 0.9    • Immature Grans % 12/20/2022 0.2    • Neutrophils, Absolute 12/20/2022 2.96    • Lymphocytes, Absolute 12/20/2022 2.38    • Monocytes, Absolute 12/20/2022 0.65    • Eosinophils, Absolute 12/20/2022 0.35    • Basophils, Absolute 12/20/2022 0.06    • Immature Grans, Absolute 12/20/2022 0.01    • nRBC 12/20/2022 0.0    Lab on 11/17/2022   Component Date Value   • Protime 11/17/2022 24.6    • INR 11/17/2022 2.52    Lab on 10/17/2022   Component Date Value   • Protime 10/17/2022 25.0    • INR 10/17/2022 2.56    Lab on 09/15/2022   Component Date Value   • Protime 09/15/2022 29.2 (H)    • INR 09/15/2022 3.02 (H)    Lab on 08/15/2022   Component Date Value   • Protime 08/15/2022 21.9    • INR 08/15/2022 2.22    Hospital Outpatient Visit on 07/25/2022   Component Date Value   • Target HR (85%) 07/25/2022 121    • Max. Pred. HR (100%) 07/25/2022 142    • ACS 07/25/2022 1.99    • Ao root diam 07/25/2022 3.3    • Ao pk nida 07/25/2022  134.4    • Ao V2 VTI 07/25/2022 29.2    • MARY(I,D) 07/25/2022 2.8    • EDV(cubed) 07/25/2022 129.9    • EDV(MOD-sp4) 07/25/2022 84.9    • EF(MOD-bp) 07/25/2022 58.0    • EF(MOD-sp4) 07/25/2022 57.7    • ESV(cubed) 07/25/2022 26.4    • ESV(MOD-sp4) 07/25/2022 35.9    • IVS/LVPW 07/25/2022 0.75    • LV mass(C)d 07/25/2022 219.3    • LV V1 max PG 07/25/2022 6.5    • LV V1 mean PG 07/25/2022 3.8    • LV V1 max 07/25/2022 127.3    • LVPWd 07/25/2022 1.29    • MV dec slope 07/25/2022 831.7    • MV dec time 07/25/2022 0.16    • MV V2 VTI 07/25/2022 32.5    • MVA(VTI) 07/25/2022 2.6    • PA acc time 07/25/2022 0.12    • PA pr(Accel) 07/25/2022 25.0    • PA V2 max 07/25/2022 91.2    • Pulm A Revs Guevara 07/25/2022 19.7    • RAP systole 07/25/2022 3.0    • RV V1 max PG 07/25/2022 2.7    • RV V1 max 07/25/2022 81.8    • RV V1 VTI 07/25/2022 21.2    • RVIDd 07/25/2022 3.6    • RVSP(TR) 07/25/2022 36.6    • SI(MOD-sp4) 07/25/2022 20.5    • SV(LVOT) 07/25/2022 83.0    • SV(MOD-sp4) 07/25/2022 49.0    • TR max PG 07/25/2022 33.6    • Ao max PG 07/25/2022 7.2    • Ao mean PG 07/25/2022 4.3    • FS 07/25/2022 41.2    • IVSd 07/25/2022 0.97    • LV V1 VTI 07/25/2022 28.4    • LVIDd 07/25/2022 5.1    • LVIDs 07/25/2022 3.0    • LVOT area 07/25/2022 2.9    • LVOT diam 07/25/2022 1.93    • MV E/A 07/25/2022 2.24    • MV max PG 07/25/2022 7.4    • MV mean PG 07/25/2022 2.6    • Pulm S/D 07/25/2022 1.00    • MV A max guevara 07/25/2022 59.8    • MV E max guevara 07/25/2022 133.7    • Pulm A Revs Dur 07/25/2022 0.09    • Pulm Mann Guevara 07/25/2022 73.7    • Pulm Sys Guevara 07/25/2022 73.4    • TR max guevara 07/25/2022 283.4    • LV Mann Vol (BSA correct* 07/25/2022 35.5    • LV Sys Vol (BSA correcte* 07/25/2022 15.0    • LA length (A2C) 07/25/2022 3.9    Lab on 07/15/2022   Component Date Value   • Protime 07/15/2022 16.4 (L)    • INR 07/15/2022 1.64 (L)      Recent labs reviewed and interpreted for clinical significance and application            Level of  Care:           Hernesto Berger MD  12/27/22  .

## 2023-01-19 PROCEDURE — 93296 REM INTERROG EVL PM/IDS: CPT | Performed by: NURSE PRACTITIONER

## 2023-01-19 PROCEDURE — 93294 REM INTERROG EVL PM/LDLS PM: CPT | Performed by: NURSE PRACTITIONER

## 2023-01-23 ENCOUNTER — LAB (OUTPATIENT)
Dept: FAMILY MEDICINE CLINIC | Facility: CLINIC | Age: 78
End: 2023-01-23
Payer: MEDICARE

## 2023-01-23 DIAGNOSIS — E53.8 B12 DEFICIENCY: ICD-10-CM

## 2023-01-23 DIAGNOSIS — I10 ESSENTIAL HYPERTENSION: ICD-10-CM

## 2023-01-23 DIAGNOSIS — Z79.01 WARFARIN THERAPY STARTED: ICD-10-CM

## 2023-01-23 LAB
CHOLEST SERPL-MCNC: 175 MG/DL (ref 0–200)
HDLC SERPL-MCNC: 43 MG/DL (ref 40–60)
INR PPP: 3.18 (ref 2–3)
LDLC SERPL CALC-MCNC: 107 MG/DL (ref 0–100)
LDLC/HDLC SERPL: 2.43 {RATIO}
PROTHROMBIN TIME: 30.7 SECONDS (ref 19.4–28.5)
TRIGL SERPL-MCNC: 138 MG/DL (ref 0–150)
VIT B12 BLD-MCNC: 454 PG/ML (ref 211–946)
VLDLC SERPL-MCNC: 25 MG/DL (ref 5–40)

## 2023-01-23 PROCEDURE — 36415 COLL VENOUS BLD VENIPUNCTURE: CPT

## 2023-01-23 PROCEDURE — 82607 VITAMIN B-12: CPT | Performed by: FAMILY MEDICINE

## 2023-01-23 PROCEDURE — 80061 LIPID PANEL: CPT | Performed by: FAMILY MEDICINE

## 2023-01-23 PROCEDURE — 85610 PROTHROMBIN TIME: CPT | Performed by: FAMILY MEDICINE

## 2023-02-23 DIAGNOSIS — D68.4 COAGULATION FACTOR DEFICIENCY SYNDROME: Primary | ICD-10-CM

## 2023-02-24 ENCOUNTER — LAB (OUTPATIENT)
Dept: FAMILY MEDICINE CLINIC | Facility: CLINIC | Age: 78
End: 2023-02-24
Payer: MEDICARE

## 2023-02-24 ENCOUNTER — TELEPHONE (OUTPATIENT)
Dept: FAMILY MEDICINE CLINIC | Facility: CLINIC | Age: 78
End: 2023-02-24
Payer: MEDICARE

## 2023-02-24 DIAGNOSIS — D68.4 COAGULATION FACTOR DEFICIENCY SYNDROME: ICD-10-CM

## 2023-02-24 LAB
INR PPP: 2.39 (ref 2–3)
PROTHROMBIN TIME: 23.4 SECONDS (ref 19.4–28.5)

## 2023-02-24 PROCEDURE — 85610 PROTHROMBIN TIME: CPT | Performed by: FAMILY MEDICINE

## 2023-02-24 PROCEDURE — 36415 COLL VENOUS BLD VENIPUNCTURE: CPT

## 2023-02-24 NOTE — TELEPHONE ENCOUNTER
----- Message from Albin Guillen MD sent at 2/24/2023 12:30 PM EST -----  INR looks great- no change

## 2023-03-27 ENCOUNTER — LAB (OUTPATIENT)
Dept: FAMILY MEDICINE CLINIC | Facility: CLINIC | Age: 78
End: 2023-03-27
Payer: MEDICARE

## 2023-03-27 DIAGNOSIS — Z79.01 WARFARIN THERAPY STARTED: ICD-10-CM

## 2023-03-27 LAB
INR PPP: 1.73 (ref 2–3)
PROTHROMBIN TIME: 17.3 SECONDS (ref 19.4–28.5)

## 2023-03-27 PROCEDURE — 85610 PROTHROMBIN TIME: CPT | Performed by: FAMILY MEDICINE

## 2023-03-27 PROCEDURE — 36415 COLL VENOUS BLD VENIPUNCTURE: CPT

## 2023-04-06 RX ORDER — METOPROLOL SUCCINATE 25 MG/1
TABLET, EXTENDED RELEASE ORAL
Qty: 180 TABLET | Refills: 3 | Status: SHIPPED | OUTPATIENT
Start: 2023-04-06

## 2023-04-07 ENCOUNTER — TELEPHONE (OUTPATIENT)
Dept: CARDIOLOGY | Facility: CLINIC | Age: 78
End: 2023-04-07
Payer: MEDICARE

## 2023-04-07 NOTE — TELEPHONE ENCOUNTER
Remote device transmission received.  Ongoing atrial fibrillation since March 16.  Ventricular rates are controlled.  She is anticoagulated with warfarin.  We will review with primary cardiologist

## 2023-04-20 PROCEDURE — 93294 REM INTERROG EVL PM/LDLS PM: CPT | Performed by: NURSE PRACTITIONER

## 2023-04-20 PROCEDURE — 93296 REM INTERROG EVL PM/IDS: CPT | Performed by: NURSE PRACTITIONER

## 2023-04-27 ENCOUNTER — TELEPHONE (OUTPATIENT)
Dept: FAMILY MEDICINE CLINIC | Facility: CLINIC | Age: 78
End: 2023-04-27
Payer: MEDICARE

## 2023-04-27 ENCOUNTER — LAB (OUTPATIENT)
Dept: FAMILY MEDICINE CLINIC | Facility: CLINIC | Age: 78
End: 2023-04-27
Payer: MEDICARE

## 2023-04-27 DIAGNOSIS — Z79.01 WARFARIN THERAPY STARTED: ICD-10-CM

## 2023-04-27 LAB
INR PPP: 3.13 (ref 2–3)
PROTHROMBIN TIME: 31.4 SECONDS (ref 19.4–28.5)

## 2023-04-27 PROCEDURE — 36415 COLL VENOUS BLD VENIPUNCTURE: CPT

## 2023-04-27 PROCEDURE — 85610 PROTHROMBIN TIME: CPT | Performed by: FAMILY MEDICINE

## 2023-04-27 NOTE — TELEPHONE ENCOUNTER
----- Message from Albin Guillen MD sent at 4/27/2023 12:38 PM EDT -----  INR slightly high at 3.1. We have been bouncing around a lot recently. Lets take a half dose today (today only) but keep our regimen the same overall. Repeat INR in 1 month

## 2023-04-27 NOTE — TELEPHONE ENCOUNTER
I spoke with Arin and she said that she took 7.5 yesterday and that may be why it was high. She takes the 7.5 once a week and it happened to be that day. She said to let her know if you want to change anything, otherwise will have retested next month.

## 2023-05-23 RX ORDER — LISINOPRIL AND HYDROCHLOROTHIAZIDE 20; 12.5 MG/1; MG/1
1 TABLET ORAL DAILY
Qty: 90 TABLET | Refills: 3 | Status: SHIPPED | OUTPATIENT
Start: 2023-05-23

## 2023-05-25 ENCOUNTER — LAB (OUTPATIENT)
Dept: FAMILY MEDICINE CLINIC | Facility: CLINIC | Age: 78
End: 2023-05-25

## 2023-05-25 DIAGNOSIS — Z79.01 WARFARIN THERAPY STARTED: ICD-10-CM

## 2023-05-25 LAB
INR PPP: 2.08 (ref 2–3)
PROTHROMBIN TIME: 21.4 SECONDS (ref 19.4–28.5)

## 2023-05-25 PROCEDURE — 85610 PROTHROMBIN TIME: CPT | Performed by: FAMILY MEDICINE

## 2023-05-25 PROCEDURE — 36415 COLL VENOUS BLD VENIPUNCTURE: CPT

## 2023-06-05 ENCOUNTER — OFFICE VISIT (OUTPATIENT)
Dept: CARDIOLOGY | Facility: CLINIC | Age: 78
End: 2023-06-05
Payer: MEDICARE

## 2023-06-05 ENCOUNTER — CLINICAL SUPPORT NO REQUIREMENTS (OUTPATIENT)
Dept: CARDIOLOGY | Facility: CLINIC | Age: 78
End: 2023-06-05
Payer: MEDICARE

## 2023-06-05 VITALS
BODY MASS INDEX: 45.99 KG/M2 | DIASTOLIC BLOOD PRESSURE: 73 MMHG | SYSTOLIC BLOOD PRESSURE: 108 MMHG | RESPIRATION RATE: 18 BRPM | WEIGHT: 293 LBS | HEIGHT: 67 IN | HEART RATE: 70 BPM

## 2023-06-05 DIAGNOSIS — I48.0 PAROXYSMAL A-FIB: Primary | ICD-10-CM

## 2023-06-05 DIAGNOSIS — I49.5 SICK SINUS SYNDROME: Primary | ICD-10-CM

## 2023-06-05 DIAGNOSIS — Z95.0 PRESENCE OF CARDIAC PACEMAKER: ICD-10-CM

## 2023-06-05 PROCEDURE — 99214 OFFICE O/P EST MOD 30 MIN: CPT | Performed by: INTERNAL MEDICINE

## 2023-06-05 PROCEDURE — 3074F SYST BP LT 130 MM HG: CPT | Performed by: INTERNAL MEDICINE

## 2023-06-05 PROCEDURE — 93000 ELECTROCARDIOGRAM COMPLETE: CPT | Performed by: INTERNAL MEDICINE

## 2023-06-05 PROCEDURE — 3078F DIAST BP <80 MM HG: CPT | Performed by: INTERNAL MEDICINE

## 2023-06-06 RX ORDER — VERAPAMIL HYDROCHLORIDE 240 MG/1
240 TABLET, FILM COATED, EXTENDED RELEASE ORAL DAILY
Qty: 90 TABLET | Refills: 3 | Status: SHIPPED | OUTPATIENT
Start: 2023-06-06 | End: 2023-06-09

## 2023-06-09 PROBLEM — I49.5 SICK SINUS SYNDROME: Chronic | Status: ACTIVE | Noted: 2019-09-09

## 2023-06-09 PROBLEM — Z95.0 PRESENCE OF CARDIAC PACEMAKER: Chronic | Status: ACTIVE | Noted: 2019-09-09

## 2023-06-09 RX ORDER — VERAPAMIL HYDROCHLORIDE 240 MG/1
240 TABLET, FILM COATED, EXTENDED RELEASE ORAL DAILY
Qty: 90 TABLET | Refills: 3 | Status: SHIPPED | OUTPATIENT
Start: 2023-06-09

## 2023-06-09 NOTE — PROGRESS NOTES
DEVICE INTERROGATION:  IN OFFICE    DEVICE TYPE:   Dual-chamber pacemaker    :   Collect    BATTERY:  Stable    TIME TO ELECTIVE REPLACEMENT INDICATORS:   9      CHARGE TIME:   Not applicable        LEAD DATA:       DEVICE REPROGRAMMED FOR TESTING      Atrial:   Paced mV, 603 ohms, 1.2 V@0.4 ms    Ventricular:     12.1 mV, 637 ohms, 1.0 V@0.4 ms    LV:      Pacemaker dependent:   Yes      Atrial pacing percentage: 36%    Ventricular pacing percentage: 97%      Arrhythmia Logbook Reviewed: Paroxysmal atrial fibrillation aflutter noted        Summary:    Stable Device Function    Increasing burden of atrial A-fib and flutter    Battery status is stable.    Reviewed with: Dr. Berger      NEXT IN OFFICE DEVICE CHECK DUE: 1 year    REMOTE DEVICE INTERROGATIONS: Ongoing

## 2023-06-13 NOTE — PROGRESS NOTES
Cardiology Clinic Note  Hernesto Berger MD, PhD    Subjective:     Encounter Date:06/05/2023      Patient ID: Arin Singh is a 77 y.o. female.    Chief Complaint:  Chief Complaint   Patient presents with    Follow-up       HPI:      I the pleasure to see this 77-year-old female today in follow-up,  she has a cardiac history of sick sinus syndrome status post pacemaker as well as tachybradycardia syndrome with paroxysmal atrial fibrillation on Coumadin therapy for thromboembolic disease with MTHFR gene mutation and again thromboembolic disease in the past remotely.  She was on anticoagulation when she first was diagnosed with atrial fibrillation.  She has no systolic heart failure with preserved LVEF, no chest pain or anginal symptoms.  She is not on antiarrhythmic therapy and is only on verapamil and metoprolol as rate and rhythm control strategy.  She has a [employer] [place] [employer] device presently AV paced on EKG.  She does not wish to be on anything that requires hospitalization such as dofetilide.  EKG today reveals AV paced rhythm at 70 similar to prior encounter, with chronic RV pacing and a high burden we will do 1 to make sure that she is not having any issues with chronic RV pacing and therefore will order 2D echo with increased pacing demand, she does have some lower extremity edema and chronic dyspnea on exertion which is likely multifactorial.  [place] of atrial fibrillation 18% on battery life interrogation nearing [facility] recommending check in 6 months   Follow-up 2D echo revealed an EF is preserved 55% with moderate atrial enlargement bilaterally, mild valvular insufficiency only.  This is despite chronic RV pacing her RV and LV continue to function normally with normal geometry.   No other issues of chest pain abnormal dyspnea on exertion, she continues to have paroxysms of A-fib which is symptomatic    Review of systems otherwise negative x14 point review of systems except was mentioned above,      Historical  "data copied forward from previous encounters in EMR is unchanged     Exam  Vitals reviewed below  Regular rate rhythm no rubs gallops no heave no lift 70  Obese soft nontender nondistended bowel sounds are positive  Pacemaker site clean and dry  Clear to auscultation  No clubbing cyanosis or edema  Pulses 2+ equal bilaterally  Unchanged from prior       MRI compatible device [employer]  6 to 9 months battery life remaining     Assessment plan per my encounter  Sick sinus syndrome with presence of pacemaker  Paroxysmal A. fib RVR, stop Multaq, stop metoprolol,   start sotalol 80 twice daily  Repeat EKG in 1 week  Avoid hypokalemia  Chronic RV pacing 99%, EF normal 55 to 60%  Continue metoprolol and verapamil  LFTs are okay  Recheck INR   Continue other antihypertensives  Continue Coumadin but adjust dosing per levels for INR 2-3  Device reinterrogation in 3 to 6 months    Back to clinic in 1 week for EKG and then will schedule likely in 3 months to 6 months from there     Hernesto Berger MD, PhD     Medicines reviewed       Historical data copied forward from previous encounters in EMR including the history, exam, and assessment/plan has been reviewed and is unchanged unless noted otherwise.    Cardiac medicines reviewed with risk, benefits, and necessity of each discussed.    Risk and benefit of cardiac testing reviewed including death heart attack stroke pain bleeding infection need for vascular /cardiovascular surgery were discussed and the patient     Objective:         /73 (BP Location: Right arm, Patient Position: Sitting)   Pulse 70   Resp 18   Ht 170.2 cm (67\")   Wt 135 kg (298 lb)   BMI 46.67 kg/m²       The pleasure to be involved in this patient's cardiovascular care.  Please call with any questions or concerns  Hernesto Berger MD, PhD    Most recent EKG as reviewed and interpreted by me:    ECG 12 Lead    Date/Time: 6/5/2023 2:28 PM  Performed by: Hernesto Berger MD  Authorized " by: Hernesto Berger MD   Comparison: not compared with previous ECG   Previous ECG: no previous ECG available  Rhythm: paced    Clinical impression: abnormal EKG         Most recent echo as reviewed and interpreted by me:  Results for orders placed during the hospital encounter of 07/25/22    Adult Transthoracic Echo Complete W/ Cont if Necessary Per Protocol    Interpretation Summary  · Estimated right ventricular systolic pressure from tricuspid regurgitation is mildly elevated (35-45 mmHg).  · Mild pulmonary hypertension is present.  · Estimated left ventricular EF was in agreement with the calculated left ventricular EF. Left ventricular ejection fraction appears to be 56 - 60%. Left ventricular systolic function is normal.  · Left atrial volume is moderately increased.  · Left ventricular diastolic function is consistent with (grade II w/high LAP) pseudonormalization.      Most recent stress test as reviewed and interpreted by me:      Most recent cardiac catheterization as reviewed interpreted by me:  No results found for this or any previous visit.    The following portions of the patient's history were reviewed and updated as appropriate: allergies, current medications, past family history, past medical history, past social history, past surgical history, and problem list.      ROS:  14 point review of systems negative except as mentioned above    Current Outpatient Medications:     lisinopril-hydrochlorothiazide (PRINZIDE,ZESTORETIC) 20-12.5 MG per tablet, TAKE 1 TABLET BY MOUTH  DAILY, Disp: 90 tablet, Rfl: 3    warfarin (COUMADIN) 6 MG tablet, TAKE 1 TABLET BY MOUTH  DAILY AS DIRECTED, Disp: 90 tablet, Rfl: 3    Sotalol HCl AF 80 MG tablet, Take 1 tablet by mouth 2 (Two) Times a Day., Disp: 60 tablet, Rfl: 3    verapamil SR (CALAN-SR) 240 MG CR tablet, TAKE 1 TABLET BY MOUTH  DAILY, Disp: 90 tablet, Rfl: 3    Problem List:  Patient Active Problem List   Diagnosis    Sick sinus syndrome     Presence of cardiac pacemaker    Absolute anemia    Coagulation factor deficiency syndrome    Hyperlipidemia, mixed    Essential hypertension    Osteoarthritis of knee    Paroxysmal atrial fibrillation    Vitamin B12 deficiency    Vitamin D deficiency    Warfarin therapy started    Screening for breast cancer    Osteopenia     Past Medical History:  Past Medical History:   Diagnosis Date    Coagulation factor deficiency syndrome 6/4/2013    Hyperlipidemia 9/12/2019    Hypertension 9/12/2019    Osteoarthritis of knee 10/7/2015    Other pulmonary embolism without acute cor pulmonale 3/8/2013    Paroxysmal atrial fibrillation 10/4/2017    Presence of cardiac pacemaker 9/9/2019    Sick sinus syndrome 9/9/2019    Vitamin B12 deficiency 11/2/2017    Vitamin D deficiency 9/25/2018     Past Surgical History:  Past Surgical History:   Procedure Laterality Date    INSERT / REPLACE / REMOVE PACEMAKER  2016    BS AV Sequential    REPLACEMENT TOTAL KNEE Right      Social History:  Social History     Socioeconomic History    Marital status:    Tobacco Use    Smoking status: Never    Smokeless tobacco: Never   Substance and Sexual Activity    Alcohol use: No    Drug use: No    Sexual activity: Defer     Allergies:  No Known Allergies  Immunizations:  Immunization History   Administered Date(s) Administered    COVID-19 (PFIZER) Purple Cap Monovalent 01/22/2021, 02/12/2021, 10/14/2021    Fluad Quad 65+ 09/25/2020    Flucelvax Quad Vial =>4yrs 10/18/2019    Fluzone High Dose =>65 Years ([employer]ax[employer] ONLY) 10/07/2015, 10/18/2016, 09/22/2017, 09/25/2018    Fluzone High-Dose 65+yrs 10/11/2021, 10/17/2022    Influenza Whole 11/16/2005, 11/15/2006, 09/23/2009    Influenza, Unspecified 10/18/2019    Pneumococcal Conjugate 13-Valent (PCV13) 10/12/2015    Pneumococcal Polysaccharide (PPSV23) 12/09/2020    Zostavax 12/05/2014            In-Office Procedure(s):  No orders to display        ASCVD RIsk Score::  The 10-year ASCVD risk score  (Mikey VILLASENOR, et al., 2019) is: 22.5%    Values used to calculate the score:      Age: 77 years      Sex: Female      Is Non- : No      Diabetic: No      Tobacco smoker: No      Systolic Blood Pressure: 108 mmHg      Is BP treated: Yes      HDL Cholesterol: 43 mg/dL      Total Cholesterol: 175 mg/dL    Imaging:                 Lab Review:   Lab on 05/25/2023   Component Date Value    Protime 05/25/2023 21.4     INR 05/25/2023 2.08    Lab on 04/27/2023   Component Date Value    Protime 04/27/2023 31.4 (H)     INR 04/27/2023 3.13 (H)    Lab on 03/27/2023   Component Date Value    Protime 03/27/2023 17.3 (L)     INR 03/27/2023 1.73 (L)    Lab on 02/24/2023   Component Date Value    Protime 02/24/2023 23.4     INR 02/24/2023 2.39    Lab on 01/23/2023   Component Date Value    Total Cholesterol 01/23/2023 175     Triglycerides 01/23/2023 138     HDL Cholesterol 01/23/2023 43     LDL Cholesterol  01/23/2023 107 (H)     VLDL Cholesterol 01/23/2023 25     LDL/HDL Ratio 01/23/2023 2.43     Protime 01/23/2023 30.7 (H)     INR 01/23/2023 3.18 (H)     Vitamin B-12 01/23/2023 454    Office Visit on 12/20/2022   Component Date Value    Glucose 12/20/2022 101 (H)     BUN 12/20/2022 25 (H)     Creatinine 12/20/2022 1.35 (H)     Sodium 12/20/2022 139     Potassium 12/20/2022 4.4     Chloride 12/20/2022 105     CO2 12/20/2022 26.0     Calcium 12/20/2022 9.7     Total Protein 12/20/2022 7.6     Albumin 12/20/2022 4.20     ALT (SGPT) 12/20/2022 8     AST (SGOT) 12/20/2022 20     Alkaline Phosphatase 12/20/2022 81     Total Bilirubin 12/20/2022 0.5     Globulin 12/20/2022 3.4     A/G Ratio 12/20/2022 1.2     BUN/Creatinine Ratio 12/20/2022 18.5     Anion Gap 12/20/2022 8.0     eGFR 12/20/2022 40.1 (L)     Hemoglobin A1C 12/20/2022 5.2     Total Cholesterol 12/20/2022 182     Triglycerides 12/20/2022 79     HDL Cholesterol 12/20/2022 59     LDL Cholesterol  12/20/2022 108 (H)     VLDL Cholesterol 12/20/2022 15      LDL/HDL Ratio 12/20/2022 1.82     TSH 12/20/2022 1.340     Free T4 12/20/2022 1.43     25 Hydroxy, Vitamin D 12/20/2022 49.6     Vitamin B-12 12/20/2022 316     Protime 12/20/2022 34.0 (H)     INR 12/20/2022 3.55 (H)     WBC 12/20/2022 6.41     RBC 12/20/2022 4.69     Hemoglobin 12/20/2022 14.6     Hematocrit 12/20/2022 45.3     MCV 12/20/2022 96.6     MCH 12/20/2022 31.1     MCHC 12/20/2022 32.2     RDW 12/20/2022 12.5     RDW-SD 12/20/2022 44.7     MPV 12/20/2022 12.2 (H)     Platelets 12/20/2022 191     Neutrophil % 12/20/2022 46.2     Lymphocyte % 12/20/2022 37.1     Monocyte % 12/20/2022 10.1     Eosinophil % 12/20/2022 5.5     Basophil % 12/20/2022 0.9     Immature Grans % 12/20/2022 0.2     Neutrophils, Absolute 12/20/2022 2.96     Lymphocytes, Absolute 12/20/2022 2.38     Monocytes, Absolute 12/20/2022 0.65     Eosinophils, Absolute 12/20/2022 0.35     Basophils, Absolute 12/20/2022 0.06     Immature Grans, Absolute 12/20/2022 0.01     nRBC 12/20/2022 0.0      Recent labs reviewed and interpreted for clinical significance and application            Level of Care:           Hernesto Berger MD  06/13/23  .

## 2023-07-20 PROCEDURE — 93294 REM INTERROG EVL PM/LDLS PM: CPT | Performed by: NURSE PRACTITIONER

## 2023-07-20 PROCEDURE — 93296 REM INTERROG EVL PM/IDS: CPT | Performed by: NURSE PRACTITIONER

## 2023-07-24 ENCOUNTER — LAB (OUTPATIENT)
Dept: FAMILY MEDICINE CLINIC | Facility: CLINIC | Age: 78
End: 2023-07-24
Payer: MEDICARE

## 2023-07-24 DIAGNOSIS — I10 ESSENTIAL HYPERTENSION: ICD-10-CM

## 2023-07-24 DIAGNOSIS — Z79.01 WARFARIN THERAPY STARTED: Primary | ICD-10-CM

## 2023-07-24 DIAGNOSIS — D68.4 COAGULATION FACTOR DEFICIENCY SYNDROME: ICD-10-CM

## 2023-07-24 LAB
INR PPP: 3.09 (ref 2–3)
PROTHROMBIN TIME: 31 SECONDS (ref 19.4–28.5)

## 2023-07-24 PROCEDURE — 85610 PROTHROMBIN TIME: CPT | Performed by: FAMILY MEDICINE

## 2023-07-24 PROCEDURE — 36415 COLL VENOUS BLD VENIPUNCTURE: CPT

## 2023-08-07 RX ORDER — SOTALOL HYDROCHLORIDE 80 MG/1
TABLET ORAL
Qty: 60 TABLET | Refills: 1 | Status: SHIPPED | OUTPATIENT
Start: 2023-08-07

## 2023-08-08 ENCOUNTER — TELEPHONE (OUTPATIENT)
Dept: FAMILY MEDICINE CLINIC | Facility: CLINIC | Age: 78
End: 2023-08-08
Payer: MEDICARE

## 2023-08-08 NOTE — TELEPHONE ENCOUNTER
Caller: Arin Singh    Relationship to patient: Self    Best call back number: [phone]    Chief complaint: ROUTINE CHECK-UP    Type of visit: MEDICARE WELLNESS    Requested date: ANYTIME IN DECEMBER EXCEPT THE 6TH OR 21ST, 2023. THE PATIENT WOULD LIKE TO BE SEEN THIS YEAR DUE TO MEDICARE.     If rescheduling, when is the original appointment: N/A - NOTHING FOUND UNTIL FEBRUARY, 2024          
HUB TO SHARE: SENT FOLLOWING [employer] MESSAGE. Medicare will only pay for a wellness exam once every 366 days so yours has to be later than December 20th. Dr Guillen will be on vacation from then until after the new year so I scheduled you to see Mayda Mathews on 12/21/23 at 1:30 pm.  
Declines

## 2023-08-25 ENCOUNTER — LAB (OUTPATIENT)
Dept: FAMILY MEDICINE CLINIC | Facility: CLINIC | Age: 78
End: 2023-08-25
Payer: MEDICARE

## 2023-08-25 DIAGNOSIS — D68.4 COAGULATION FACTOR DEFICIENCY SYNDROME: ICD-10-CM

## 2023-08-25 LAB
INR PPP: 2.19 (ref 2–3)
PROTHROMBIN TIME: 22.4 SECONDS (ref 19.4–28.5)

## 2023-08-25 PROCEDURE — 36415 COLL VENOUS BLD VENIPUNCTURE: CPT

## 2023-08-25 PROCEDURE — 85610 PROTHROMBIN TIME: CPT | Performed by: FAMILY MEDICINE

## 2023-09-20 RX ORDER — WARFARIN SODIUM 6 MG/1
6 TABLET ORAL DAILY
Qty: 90 TABLET | Refills: 1 | Status: SHIPPED | OUTPATIENT
Start: 2023-09-20

## 2023-09-26 ENCOUNTER — LAB (OUTPATIENT)
Dept: FAMILY MEDICINE CLINIC | Facility: CLINIC | Age: 78
End: 2023-09-26
Payer: MEDICARE

## 2023-09-26 DIAGNOSIS — D68.4 COAGULATION FACTOR DEFICIENCY SYNDROME: ICD-10-CM

## 2023-09-26 LAB
INR PPP: 3.05 (ref 2–3)
PROTHROMBIN TIME: 30.6 SECONDS (ref 19.4–28.5)

## 2023-09-26 PROCEDURE — 36415 COLL VENOUS BLD VENIPUNCTURE: CPT

## 2023-09-26 PROCEDURE — 85610 PROTHROMBIN TIME: CPT | Performed by: FAMILY MEDICINE

## 2023-11-02 ENCOUNTER — LAB (OUTPATIENT)
Dept: FAMILY MEDICINE CLINIC | Facility: CLINIC | Age: 78
End: 2023-11-02
Payer: MEDICARE

## 2023-11-02 DIAGNOSIS — D68.4 COAGULATION FACTOR DEFICIENCY SYNDROME: ICD-10-CM

## 2023-11-02 LAB
INR PPP: 3.71 (ref 2–3)
PROTHROMBIN TIME: 36.7 SECONDS (ref 19.4–28.5)

## 2023-11-02 PROCEDURE — 85610 PROTHROMBIN TIME: CPT | Performed by: FAMILY MEDICINE

## 2023-11-02 PROCEDURE — 36415 COLL VENOUS BLD VENIPUNCTURE: CPT

## 2023-12-01 ENCOUNTER — LAB (OUTPATIENT)
Dept: FAMILY MEDICINE CLINIC | Facility: CLINIC | Age: 78
End: 2023-12-01
Payer: MEDICARE

## 2023-12-01 DIAGNOSIS — D68.4 COAGULATION FACTOR DEFICIENCY SYNDROME: ICD-10-CM

## 2023-12-01 LAB
INR PPP: 2.07 (ref 2–3)
PROTHROMBIN TIME: 21.4 SECONDS (ref 19.4–28.5)

## 2023-12-01 PROCEDURE — 36415 COLL VENOUS BLD VENIPUNCTURE: CPT

## 2023-12-01 PROCEDURE — 85610 PROTHROMBIN TIME: CPT | Performed by: FAMILY MEDICINE

## 2023-12-06 ENCOUNTER — CLINICAL SUPPORT NO REQUIREMENTS (OUTPATIENT)
Dept: CARDIOLOGY | Facility: CLINIC | Age: 78
End: 2023-12-06
Payer: MEDICARE

## 2023-12-06 ENCOUNTER — OFFICE VISIT (OUTPATIENT)
Dept: CARDIOLOGY | Facility: CLINIC | Age: 78
End: 2023-12-06
Payer: MEDICARE

## 2023-12-06 VITALS
HEIGHT: 67 IN | DIASTOLIC BLOOD PRESSURE: 84 MMHG | BODY MASS INDEX: 45.99 KG/M2 | RESPIRATION RATE: 18 BRPM | WEIGHT: 293 LBS | HEART RATE: 70 BPM | SYSTOLIC BLOOD PRESSURE: 135 MMHG

## 2023-12-06 DIAGNOSIS — T44.7X5S: Primary | ICD-10-CM

## 2023-12-06 DIAGNOSIS — I49.5 SICK SINUS SYNDROME: Primary | Chronic | ICD-10-CM

## 2023-12-06 DIAGNOSIS — I10 ESSENTIAL HYPERTENSION: ICD-10-CM

## 2023-12-06 DIAGNOSIS — I48.0 PAROXYSMAL ATRIAL FIBRILLATION: ICD-10-CM

## 2023-12-06 PROCEDURE — 93280 PM DEVICE PROGR EVAL DUAL: CPT | Performed by: NURSE PRACTITIONER

## 2023-12-06 NOTE — PROGRESS NOTES
Cardiology Clinic Note  Hernesto Berger MD, PhD    Subjective:     Encounter Date:12/06/2023      Patient ID: Arin Singh is a 78 y.o. female.    Chief Complaint:  Chief Complaint   Patient presents with    Follow-up       HPI:        I the pleasure to see this 78-year-old female today in follow-up,  she has a cardiac history of sick sinus syndrome status post pacemaker as well as tachybradycardia syndrome with paroxysmal atrial fibrillation on Coumadin therapy for thromboembolic disease with MTHFR gene mutation and again thromboembolic disease in the past remotely.  She was on anticoagulation when she first was diagnosed with atrial fibrillation.  She has no systolic heart failure with preserved LVEF, no chest pain or anginal symptoms.  She is not on antiarrhythmic therapy and is only on verapamil and metoprolol as rate and rhythm control strategy.  She has a [employer] device presently AV paced on EKG.  She does not wish to be on anything that requires hospitalization such as dofetilide.  EKG today reveals AV paced rhythm at 70 similar to prior encounter,  Follow-up 2D echo revealed an EF is preserved 55% with moderate atrial enlargement bilaterally, mild valvular insufficiency only.  This is despite chronic RV pacing her RV and LV continue to function normally with normal geometry.   No other issues of chest pain abnormal dyspnea on exertion, she continues to have paroxysms of A-fib which is symptomatic, she continues to do very well euvolemic well compensated on present medications.  QTc normal on EKG with AV paced rhythm, 3 months of battery life remaining as discussed today     Review of systems otherwise negative x14 point review of systems except was mentioned above,      Historical data copied forward from previous encounters in EMR is unchanged     Exam  Vitals reviewed below  Regular rate rhythm no rubs gallops no heave no lift 70  Obese soft nontender nondistended bowel sounds are  "positive  Pacemaker site clean and dry  Clear to auscultation  No clubbing cyanosis or edema  Pulses 2+ equal bilaterally  Unchanged from prior        MRI compatible device [employer]  6 to 9 months battery life remaining     Assessment plan per my encounter  Sick sinus syndrome with presence of pacemaker  Paroxysmal A. fib RVR, stop Multaq, stop metoprolol,   Continue sotalol 80 twice daily  QT is normal with underlying AV paced rhythm  Avoid hypokalemia  Chronic RV pacing 99%, EF normal 55 to 60%  Continue metoprolol and verapamil  LFTs are okay  Recheck INR   Continue other antihypertensives  Continue Coumadin but adjust dosing per levels for INR 2-3  Device reinterrogation in 3 months with battery end-of-life     Back to clinic in 1 week for EKG and then will schedule likely in 3 months to 6 months from there    Objective:         /84 (BP Location: Left arm, Patient Position: Sitting)   Pulse 70   Resp 18   Ht 170.2 cm (67\")   Wt 136 kg (299 lb)   BMI 46.83 kg/m²     Physical Exam    Assessment:         There are no diagnoses linked to this encounter.       Plan:              The pleasure to be involved in this patient's cardiovascular care.  Please call with any questions or concerns  Hernesto Berger MD, PhD    Most recent EKG as reviewed and interpreted by me:    ECG 12 Lead    Date/Time: 12/6/2023 3:47 PM  Performed by: Hernesto Berger MD    Authorized by: Hernesto Berger MD  Comparison: not compared with previous ECG   Previous ECG: no previous ECG available  Rhythm: paced    Clinical impression: abnormal EKG  Comments: AV paced rhythm           Most recent echo as reviewed and interpreted by me:  Results for orders placed during the hospital encounter of 07/25/22    Adult Transthoracic Echo Complete W/ Cont if Necessary Per Protocol    Interpretation Summary  · Estimated right ventricular systolic pressure from tricuspid regurgitation is mildly elevated (35-45 mmHg).  · " Mild pulmonary hypertension is present.  · Estimated left ventricular EF was in agreement with the calculated left ventricular EF. Left ventricular ejection fraction appears to be 56 - 60%. Left ventricular systolic function is normal.  · Left atrial volume is moderately increased.  · Left ventricular diastolic function is consistent with (grade II w/high LAP) pseudonormalization.      Most recent stress test as reviewed and interpreted by me:      Most recent cardiac catheterization as reviewed interpreted by me:  No results found for this or any previous visit.    The following portions of the patient's history were reviewed and updated as appropriate: allergies, current medications, past family history, past medical history, past social history, past surgical history, and problem list.      ROS:  14 point review of systems negative except as mentioned above    Current Outpatient Medications:     lisinopril-hydrochlorothiazide (PRINZIDE,ZESTORETIC) 20-12.5 MG per tablet, TAKE 1 TABLET BY MOUTH  DAILY, Disp: 90 tablet, Rfl: 3    sotalol (BETAPACE AF) 80 MG tablet tablet, TAKE 1 TABLET BY MOUTH TWICE A DAY, Disp: 60 tablet, Rfl: 1    verapamil SR (CALAN-SR) 240 MG CR tablet, TAKE 1 TABLET BY MOUTH  DAILY, Disp: 90 tablet, Rfl: 3    warfarin (COUMADIN) 6 MG tablet, TAKE 1 TABLET BY MOUTH  DAILY AS DIRECTED, Disp: 90 tablet, Rfl: 1    Problem List:  Patient Active Problem List   Diagnosis    Sick sinus syndrome    Presence of cardiac pacemaker    Absolute anemia    Coagulation factor deficiency syndrome    Hyperlipidemia, mixed    Essential hypertension    Osteoarthritis of knee    Paroxysmal atrial fibrillation    Vitamin B12 deficiency    Vitamin D deficiency    Warfarin therapy started    Screening for breast cancer    Osteopenia     Past Medical History:  Past Medical History:   Diagnosis Date    Coagulation factor deficiency syndrome 6/4/2013    Hyperlipidemia 9/12/2019    Hypertension 9/12/2019    Osteoarthritis  of knee 10/7/2015    Other pulmonary embolism without acute cor pulmonale 3/8/2013    Paroxysmal atrial fibrillation 10/4/2017    Presence of cardiac pacemaker 9/9/2019    Sick sinus syndrome 9/9/2019    Vitamin B12 deficiency 11/2/2017    Vitamin D deficiency 9/25/2018     Past Surgical History:  Past Surgical History:   Procedure Laterality Date    INSERT / REPLACE / REMOVE PACEMAKER  2016    BS AV Sequential    REPLACEMENT TOTAL KNEE Right      Social History:  Social History     Socioeconomic History    Marital status:    Tobacco Use    Smoking status: Never    Smokeless tobacco: Never   Vaping Use    Vaping Use: Never used   Substance and Sexual Activity    Alcohol use: No    Drug use: No    Sexual activity: Defer     Allergies:  No Known Allergies  Immunizations:  Immunization History   Administered Date(s) Administered    COVID-19 (PFIZER) Purple Cap Monovalent 01/22/2021, 02/12/2021, 10/14/2021    Fluad Quad 65+ 09/25/2020    Flucelvax Quad Vial =>4yrs 10/18/2019    Fluzone High Dose =>65 Years ([employer] ONLY) 10/07/2015, 10/18/2016, 09/22/2017, 09/25/2018    Fluzone High-Dose 65+yrs 10/11/2021, 10/17/2022    Influenza Whole 11/16/2005, 11/15/2006, 09/23/2009    Influenza, Unspecified 10/18/2019    Pneumococcal Conjugate 13-Valent (PCV13) 10/12/2015    Pneumococcal Polysaccharide (PPSV23) 12/09/2020    Zostavax 12/05/2014            In-Office Procedure(s):  No orders to display        ASCVD RIsk Score::  The ASCVD Risk score (Mikey DK, et al., 2019) failed to calculate for the following reasons:    The 2019 ASCVD risk score is only valid for ages 40 to 79    Imaging:                 Lab Review:   Lab on 12/01/2023   Component Date Value    Protime 12/01/2023 21.4     INR 12/01/2023 2.07    Lab on 11/02/2023   Component Date Value    Protime 11/02/2023 36.7 (H)     INR 11/02/2023 3.71 (H)    Lab on 09/26/2023   Component Date Value    Protime 09/26/2023 30.6 (H)     INR 09/26/2023 3.05 (H)    Lab on  08/25/2023   Component Date Value    Protime 08/25/2023 22.4     INR 08/25/2023 2.19    Lab on 07/24/2023   Component Date Value    Protime 07/24/2023 31.0 (H)     INR 07/24/2023 3.09 (H)    Lab on 06/23/2023   Component Date Value    Protime 06/23/2023 30.1 (H)     INR 06/23/2023 2.99      Recent labs reviewed and interpreted for clinical significance and application            Level of Care:           Hernesto Berger MD  12/06/23  .

## 2023-12-11 NOTE — PROGRESS NOTES
DEVICE INTERROGATION:  IN OFFICE    DEVICE TYPE:   Dual-chamber pacemaker    :   [employer]    BATTERY:  Stable    TIME TO ELECTIVE REPLACEMENT INDICATORS:   4 months    CHARGE TIME:   Not applicable        LEAD DATA:       DEVICE REPROGRAMMED FOR TESTING      Atrial:   PAC ED mV, 608 ohms, 1.5 V@0.4 ms    Ventricular:     11 mV, 643 ohms, 1.1 V@0.4 ms    LV:      Pacemaker dependent:   Yes      Atrial pacing percentage: 53%    Ventricular pacing percentage: 100%      Arrhythmia Logbook Reviewed: No A-fib        Summary:    Stable Device Function    No significant arhythmia burden.     Battery status is stable.    Reviewed with: MD      NEXT IN OFFICE DEVICE CHECK DUE: 3 months    REMOTE DEVICE INTERROGATIONS: Ongoing/nearing [facility]ORK[facility]

## 2023-12-26 ENCOUNTER — TRANSCRIBE ORDERS (OUTPATIENT)
Dept: ADMINISTRATIVE | Facility: HOSPITAL | Age: 78
End: 2023-12-26
Payer: MEDICARE

## 2023-12-26 ENCOUNTER — LAB (OUTPATIENT)
Dept: LAB | Facility: HOSPITAL | Age: 78
End: 2023-12-26
Payer: MEDICARE

## 2023-12-26 DIAGNOSIS — Z79.01 ADEQUATE ANTICOAGULATION ON ANTICOAGULANT THERAPY: ICD-10-CM

## 2023-12-26 DIAGNOSIS — Z79.01 ADEQUATE ANTICOAGULATION ON ANTICOAGULANT THERAPY: Primary | ICD-10-CM

## 2023-12-26 LAB
INR PPP: 1.34 (ref 2–3)
PROTHROMBIN TIME: 14.3 SECONDS (ref 19.4–28.5)

## 2023-12-26 PROCEDURE — 36415 COLL VENOUS BLD VENIPUNCTURE: CPT

## 2023-12-26 PROCEDURE — 85610 PROTHROMBIN TIME: CPT

## 2024-01-03 ENCOUNTER — OFFICE VISIT (OUTPATIENT)
Dept: FAMILY MEDICINE CLINIC | Facility: CLINIC | Age: 79
End: 2024-01-03
Payer: MEDICARE

## 2024-01-03 ENCOUNTER — LAB (OUTPATIENT)
Dept: FAMILY MEDICINE CLINIC | Facility: CLINIC | Age: 79
End: 2024-01-03
Payer: MEDICARE

## 2024-01-03 VITALS
OXYGEN SATURATION: 98 % | BODY MASS INDEX: 45.99 KG/M2 | HEIGHT: 67 IN | SYSTOLIC BLOOD PRESSURE: 118 MMHG | WEIGHT: 293 LBS | HEART RATE: 70 BPM | RESPIRATION RATE: 20 BRPM | DIASTOLIC BLOOD PRESSURE: 80 MMHG

## 2024-01-03 DIAGNOSIS — M17.0 OSTEOARTHRITIS OF BOTH KNEES, UNSPECIFIED OSTEOARTHRITIS TYPE: ICD-10-CM

## 2024-01-03 DIAGNOSIS — I10 ESSENTIAL HYPERTENSION: ICD-10-CM

## 2024-01-03 DIAGNOSIS — G47.33 OSA ON CPAP: ICD-10-CM

## 2024-01-03 DIAGNOSIS — I49.5 SICK SINUS SYNDROME: Chronic | ICD-10-CM

## 2024-01-03 DIAGNOSIS — Z00.00 MEDICARE ANNUAL WELLNESS VISIT, SUBSEQUENT: Primary | ICD-10-CM

## 2024-01-03 DIAGNOSIS — I48.0 PAROXYSMAL ATRIAL FIBRILLATION: ICD-10-CM

## 2024-01-03 DIAGNOSIS — E55.9 VITAMIN D DEFICIENCY, UNSPECIFIED: ICD-10-CM

## 2024-01-03 LAB
INR PPP: 2.29 (ref 2–3)
PROTHROMBIN TIME: 23.5 SECONDS (ref 19.4–28.5)

## 2024-01-03 PROCEDURE — 82607 VITAMIN B-12: CPT | Performed by: FAMILY MEDICINE

## 2024-01-03 PROCEDURE — 84443 ASSAY THYROID STIM HORMONE: CPT | Performed by: FAMILY MEDICINE

## 2024-01-03 PROCEDURE — 84439 ASSAY OF FREE THYROXINE: CPT | Performed by: FAMILY MEDICINE

## 2024-01-03 PROCEDURE — 80061 LIPID PANEL: CPT | Performed by: FAMILY MEDICINE

## 2024-01-03 PROCEDURE — 85610 PROTHROMBIN TIME: CPT | Performed by: FAMILY MEDICINE

## 2024-01-03 PROCEDURE — 83036 HEMOGLOBIN GLYCOSYLATED A1C: CPT | Performed by: FAMILY MEDICINE

## 2024-01-03 PROCEDURE — 80053 COMPREHEN METABOLIC PANEL: CPT | Performed by: FAMILY MEDICINE

## 2024-01-03 PROCEDURE — 82306 VITAMIN D 25 HYDROXY: CPT | Performed by: FAMILY MEDICINE

## 2024-01-03 PROCEDURE — 36415 COLL VENOUS BLD VENIPUNCTURE: CPT | Performed by: FAMILY MEDICINE

## 2024-01-03 PROCEDURE — 85025 COMPLETE CBC W/AUTO DIFF WBC: CPT | Performed by: FAMILY MEDICINE

## 2024-01-03 NOTE — PROGRESS NOTES
The ABCs of the Annual Wellness Visit  Subsequent Medicare Wellness Visit    Subjective    Arin Singh is a 78 y.o. female who presents for a Subsequent Medicare Wellness Visit.    The following portions of the patient's history were reviewed and   updated as appropriate: allergies, current medications, past family history, past medical history, past social history, past surgical history, and problem list.    Compared to one year ago, the patient feels her physical   health is the same.    Compared to one year ago, the patient feels her mental   health is the same.    Recent Hospitalizations:  She was not admitted to the hospital during the last year.     Current Medical Providers:  Patient Care Team:  Albin Guillen MD as PCP - General (Internal Medicine)    Outpatient Medications Prior to Visit   Medication Sig Dispense Refill    lisinopril-hydrochlorothiazide (PRINZIDE,ZESTORETIC) 20-12.5 MG per tablet TAKE 1 TABLET BY MOUTH  DAILY 90 tablet 3    sotalol (BETAPACE AF) 80 MG tablet tablet TAKE 1 TABLET BY MOUTH TWICE A DAY 60 tablet 1    verapamil SR (CALAN-SR) 240 MG CR tablet TAKE 1 TABLET BY MOUTH  DAILY 90 tablet 3    warfarin (COUMADIN) 6 MG tablet TAKE 1 TABLET BY MOUTH  DAILY AS DIRECTED 90 tablet 1     No facility-administered medications prior to visit.     No opioid medication identified on active medication list. I have reviewed chart for other potential  high risk medication/s and harmful drug interactions in the elderly.      Aspirin is not on active medication list.  Aspirin use is not indicated based on review of current medical condition/s. Risk of harm outweighs potential benefits.  .    Patient Active Problem List   Diagnosis    Sick sinus syndrome    Presence of cardiac pacemaker    Absolute anemia    Coagulation factor deficiency syndrome    Hyperlipidemia, mixed    Essential hypertension    Osteoarthritis of knee    Paroxysmal atrial fibrillation    Vitamin B12 deficiency    Vitamin  "D deficiency    Warfarin therapy started    Screening for breast cancer    Osteopenia    YESY on CPAP     Advance Care Planning   Advance Care Planning     Advance Directive is on file.  ACP discussion was held with the patient during this visit. Patient has an advance directive in EMR which is still valid.      Objective    Vitals:    24 1252   BP: 118/80   Pulse: 70   Resp: 20   SpO2: 98%   Weight: 135 kg (296 lb 12.8 oz)   Height: 170.2 cm (67\")     Estimated body mass index is 46.49 kg/m² as calculated from the following:    Height as of this encounter: 170.2 cm (67\").    Weight as of this encounter: 135 kg (296 lb 12.8 oz).    Class 3 Severe Obesity (BMI >=40). Obesity-related health conditions include the following: hypertension and osteoarthritis. Obesity is unchanged. BMI is is above average; BMI management plan is completed. We discussed portion control and increasing exercise.    Does the patient have evidence of cognitive impairment? No        HEALTH RISK ASSESSMENT    Smoking Status:  Social History     Tobacco Use   Smoking Status Never   Smokeless Tobacco Never     Alcohol Consumption:  Social History     Substance and Sexual Activity   Alcohol Use No     Fall Risk Screen:  STEADI Fall Risk Assessment was completed, and patient is at LOW risk for falls.Assessment completed on:1/3/2024    Depression Screenin/3/2024    12:51 PM   PHQ-2/PHQ-9 Depression Screening   Little Interest or Pleasure in Doing Things 0-->not at all   Feeling Down, Depressed or Hopeless 0-->not at all   PHQ-9: Brief Depression Severity Measure Score 0     Health Habits and Functional and Cognitive Screenin/3/2024    12:51 PM   Functional & Cognitive Status   Do you have difficulty preparing food and eating? No   Do you have difficulty bathing yourself, getting dressed or grooming yourself? No   Do you have difficulty using the toilet? No   Do you have difficulty moving around from place to place? No   Do you " have trouble with steps or getting out of a bed or a chair? Yes   Current Diet Well Balanced Diet   Dental Exam Up to date   Eye Exam Up to date   Exercise (times per week) 2 times per week   Current Exercises Include Other;Walking   Do you need help using the phone?  No   Are you deaf or do you have serious difficulty hearing?  No   Do you need help to go to places out of walking distance? No   Do you need help shopping? No   Do you need help preparing meals?  No   Do you need help with housework?  No   Do you need help with laundry? No   Do you need help taking your medications? No   Do you need help managing money? No   Do you ever drive or ride in a car without wearing a seat belt? No   Have you felt unusual stress, anger or loneliness in the last month? No   Who do you live with? Community   If you need help, do you have trouble finding someone available to you? No   Do you have difficulty concentrating, remembering or making decisions? No     Age-appropriate Screening Schedule:  Refer to the list below for future screening recommendations based on patient's age, sex and/or medical conditions. Orders for these recommended tests are listed in the plan section. The patient has been provided with a written plan.    Health Maintenance   Topic Date Due    TDAP/TD VACCINES (1 - Tdap) Never done    ZOSTER VACCINE (2 of 3) 01/30/2015    COVID-19 Vaccine (4 - 2023-24 season) 09/01/2023    BMI FOLLOWUP  12/20/2023    LIPID PANEL  01/23/2024    DXA SCAN  03/02/2024    ANNUAL WELLNESS VISIT  01/03/2025    INFLUENZA VACCINE  Completed    Pneumococcal Vaccine 65+  Completed        CMS Preventative Services Quick Reference  Risk Factors Identified During Encounter  Immunizations Discussed/Encouraged: Tdap and Shingrix  The above risks/problems have been discussed with the patient.  Pertinent information has been shared with the patient in the After Visit Summary.  An After Visit Summary and PPPS were made available to the  patient.    Preventative  Colonoscopy: 2014, potentially due 2024. Deferred per patient preference  Mammogram: 3/2022, declined further  Pap smear: aged out  DEXA: 3/2022- osteopenia. Taking Ca/VitD daily  Shingles: Zostavax 12/2014  Pneumonia: Prevnar 10/2015, Pneumovax 12/2020  Tdap: Recommended, declined  Influenza: 10/2023  COVID: Completed 3 shots Pfizer (10/2021); counseled regarding booster    Follow Up:   Next Medicare Wellness visit to be scheduled in 1 year.       Additional E&M Note during same encounter follows:  Patient has multiple medical problems which are significant and separately identifiable that require additional work above and beyond the Medicare Wellness Visit.      Chief Complaint  Medicare Wellness-subsequent    Subjective        HPI  Arin Singh is also being seen today for multiple medical problems    PAF: denies palpitations. Asymptomatic w/ afib. S/p pacemaker. Also maintained on sotalol 80 BID, verapamil 240 daily. Warfarin regimen is 6mg daily. Follows w/ DC Berger.      SSS: s/p pacemaker in 6/2016. Does report some fatigue but still very active. Coming due for battery change. Follows w/ DC Berger/Corinne     HTN: 118/80 today. Maintained on Prinzide 20/12.5 and verapamil 240 daily. No LH/dizziness, CP. Reports SOB w/ activity/exertion     OA: s/p R knee replacement and L knee bothers her most. Not taking any medication as Voltaren gel did not seem to help. Never had steroid shot. Not interested in this or further surgery. No significant pain, just stiffness.      YESY: now maintained on CPAP. Doesn't like the idea of CPAP but does admit to sleeping better w/ CPAP. Overall, happy w/ current control    Review of Systems   Constitutional:  Negative for chills and fever.   HENT:  Negative for congestion and rhinorrhea.    Eyes:  Negative for visual disturbance.   Respiratory:  Positive for shortness of breath. Negative for cough.    Cardiovascular:  Negative for chest pain and  "palpitations.   Gastrointestinal:  Negative for abdominal pain and vomiting.   Genitourinary:  Negative for difficulty urinating and dysuria.   Musculoskeletal:  Positive for arthralgias and gait problem. Negative for back pain.   Skin:  Negative for rash.   Neurological:  Negative for dizziness and light-headedness.   Psychiatric/Behavioral:  Negative for dysphoric mood and sleep disturbance. The patient is not nervous/anxious.      Objective   Vital Signs:  /80   Pulse 70   Resp 20   Ht 170.2 cm (67\")   Wt 135 kg (296 lb 12.8 oz)   SpO2 98%   BMI 46.49 kg/m²     Physical Exam  Constitutional:       General: She is not in acute distress.     Appearance: She is well-developed. She is obese.   HENT:      Head: Normocephalic and atraumatic.      Right Ear: Tympanic membrane and external ear normal. There is impacted cerumen.      Left Ear: Tympanic membrane and external ear normal. There is impacted cerumen.      Nose: Nose normal.      Mouth/Throat:      Mouth: Mucous membranes are moist.      Pharynx: No oropharyngeal exudate or posterior oropharyngeal erythema.   Eyes:      General: No scleral icterus.        Right eye: No discharge.         Left eye: No discharge.      Extraocular Movements: Extraocular movements intact.      Conjunctiva/sclera: Conjunctivae normal.      Pupils: Pupils are equal, round, and reactive to light.   Neck:      Thyroid: No thyromegaly.      Vascular: No carotid bruit.   Cardiovascular:      Rate and Rhythm: Normal rate and regular rhythm.      Heart sounds: Normal heart sounds. No murmur heard.  Pulmonary:      Effort: Pulmonary effort is normal. No respiratory distress.      Breath sounds: Normal breath sounds. No wheezing or rales.   Abdominal:      General: Bowel sounds are normal. There is no distension.      Palpations: Abdomen is soft.      Tenderness: There is no abdominal tenderness.   Musculoskeletal:         General: No deformity. Normal range of motion.      " Cervical back: Normal range of motion and neck supple.   Lymphadenopathy:      Cervical: No cervical adenopathy.   Skin:     General: Skin is warm and dry.      Findings: No rash.   Neurological:      General: No focal deficit present.      Mental Status: She is alert and oriented to person, place, and time. Mental status is at baseline.      Cranial Nerves: No cranial nerve deficit.      Sensory: No sensory deficit.   Psychiatric:         Behavior: Behavior normal.         Thought Content: Thought content normal.             Assessment and Plan   Diagnoses and all orders for this visit:    1. Medicare annual wellness visit, subsequent (Primary)  -     CBC & Differential  -     Comprehensive Metabolic Panel  -     Hemoglobin A1c  -     Lipid Panel  -     TSH  -     T4, Free  -     Vitamin D,25-Hydroxy  -     Vitamin B12  -  Counseled regarding diet, exercise, weight loss, and preventative health maintenance items/immunizations below    2. Paroxysmal atrial fibrillation  -     Protime-INR  - Cont home sotalol 80mg BID, verapamil 240mg daily and warfarin 6mg daily  - Cont CARDS f/u- Ab    3. Sick sinus syndrome: s/p pacemaker placement in 6/2016   - Cont CARDS f/u- Ab/Corinne    4. Essential hypertension: 118/80 today  -     Comprehensive Metabolic Panel  - Cont home Prinzide 20/12.5 daily and verapamil 240mg daily    5. Osteoarthritis of both knees, unspecified osteoarthritis type: s/p R knee replacement. Denies pain and no limitations to activity at this time. Not interested in further intervention   - Monitor     6. YESY on CPAP   - Cont home CPAP    7. Vitamin D deficiency, unspecified  -     Vitamin D,25-Hydroxy       Follow Up   Return in about 1 year (around 1/3/2025) for Medicare Wellness.  Patient was given instructions and counseling regarding her condition or for health maintenance advice. Please see specific information pulled into the AVS if appropriate.

## 2024-01-04 LAB
25(OH)D3 SERPL-MCNC: 43.5 NG/ML (ref 30–100)
ALBUMIN SERPL-MCNC: 4.3 G/DL (ref 3.5–5.2)
ALBUMIN/GLOB SERPL: 1.3 G/DL
ALP SERPL-CCNC: 100 U/L (ref 39–117)
ALT SERPL W P-5'-P-CCNC: 10 U/L (ref 1–33)
ANION GAP SERPL CALCULATED.3IONS-SCNC: 11 MMOL/L (ref 5–15)
AST SERPL-CCNC: 19 U/L (ref 1–32)
BASOPHILS # BLD AUTO: 0.04 10*3/MM3 (ref 0–0.2)
BASOPHILS NFR BLD AUTO: 0.8 % (ref 0–1.5)
BILIRUB SERPL-MCNC: 0.5 MG/DL (ref 0–1.2)
BUN SERPL-MCNC: 19 MG/DL (ref 8–23)
BUN/CREAT SERPL: 16.1 (ref 7–25)
CALCIUM SPEC-SCNC: 10.9 MG/DL (ref 8.6–10.5)
CHLORIDE SERPL-SCNC: 104 MMOL/L (ref 98–107)
CHOLEST SERPL-MCNC: 206 MG/DL (ref 0–200)
CO2 SERPL-SCNC: 27 MMOL/L (ref 22–29)
CREAT SERPL-MCNC: 1.18 MG/DL (ref 0.57–1)
DEPRECATED RDW RBC AUTO: 41 FL (ref 37–54)
EGFRCR SERPLBLD CKD-EPI 2021: 46.8 ML/MIN/1.73
EOSINOPHIL # BLD AUTO: 0.19 10*3/MM3 (ref 0–0.4)
EOSINOPHIL NFR BLD AUTO: 3.6 % (ref 0.3–6.2)
ERYTHROCYTE [DISTWIDTH] IN BLOOD BY AUTOMATED COUNT: 12 % (ref 12.3–15.4)
GLOBULIN UR ELPH-MCNC: 3.3 GM/DL
GLUCOSE SERPL-MCNC: 103 MG/DL (ref 65–99)
HBA1C MFR BLD: 5.8 % (ref 4.8–5.6)
HCT VFR BLD AUTO: 43.9 % (ref 34–46.6)
HDLC SERPL-MCNC: 53 MG/DL (ref 40–60)
HGB BLD-MCNC: 15 G/DL (ref 12–15.9)
IMM GRANULOCYTES # BLD AUTO: 0.02 10*3/MM3 (ref 0–0.05)
IMM GRANULOCYTES NFR BLD AUTO: 0.4 % (ref 0–0.5)
LDLC SERPL CALC-MCNC: 135 MG/DL (ref 0–100)
LDLC/HDLC SERPL: 2.51 {RATIO}
LYMPHOCYTES # BLD AUTO: 2.08 10*3/MM3 (ref 0.7–3.1)
LYMPHOCYTES NFR BLD AUTO: 39 % (ref 19.6–45.3)
MCH RBC QN AUTO: 31.8 PG (ref 26.6–33)
MCHC RBC AUTO-ENTMCNC: 34.2 G/DL (ref 31.5–35.7)
MCV RBC AUTO: 93 FL (ref 79–97)
MONOCYTES # BLD AUTO: 0.53 10*3/MM3 (ref 0.1–0.9)
MONOCYTES NFR BLD AUTO: 9.9 % (ref 5–12)
NEUTROPHILS NFR BLD AUTO: 2.47 10*3/MM3 (ref 1.7–7)
NEUTROPHILS NFR BLD AUTO: 46.3 % (ref 42.7–76)
NRBC BLD AUTO-RTO: 0 /100 WBC (ref 0–0.2)
PLATELET # BLD AUTO: 196 10*3/MM3 (ref 140–450)
PMV BLD AUTO: 11.7 FL (ref 6–12)
POTASSIUM SERPL-SCNC: 4.2 MMOL/L (ref 3.5–5.2)
PROT SERPL-MCNC: 7.6 G/DL (ref 6–8.5)
RBC # BLD AUTO: 4.72 10*6/MM3 (ref 3.77–5.28)
SODIUM SERPL-SCNC: 142 MMOL/L (ref 136–145)
T4 FREE SERPL-MCNC: 1.48 NG/DL (ref 0.93–1.7)
TRIGL SERPL-MCNC: 99 MG/DL (ref 0–150)
TSH SERPL DL<=0.05 MIU/L-ACNC: 1.04 UIU/ML (ref 0.27–4.2)
VIT B12 BLD-MCNC: 469 PG/ML (ref 211–946)
VLDLC SERPL-MCNC: 18 MG/DL (ref 5–40)
WBC NRBC COR # BLD AUTO: 5.33 10*3/MM3 (ref 3.4–10.8)

## 2024-01-24 RX ORDER — SOTALOL HYDROCHLORIDE 80 MG/1
80 TABLET ORAL 2 TIMES DAILY
Qty: 60 TABLET | Refills: 1 | Status: SHIPPED | OUTPATIENT
Start: 2024-01-24

## 2024-02-06 ENCOUNTER — LAB (OUTPATIENT)
Dept: FAMILY MEDICINE CLINIC | Facility: CLINIC | Age: 79
End: 2024-02-06
Payer: MEDICARE

## 2024-02-06 DIAGNOSIS — D68.4 COAGULATION FACTOR DEFICIENCY SYNDROME: ICD-10-CM

## 2024-02-06 LAB
INR PPP: 3.17 (ref 2–3)
PROTHROMBIN TIME: 31.7 SECONDS (ref 19.4–28.5)

## 2024-02-06 PROCEDURE — 85610 PROTHROMBIN TIME: CPT | Performed by: FAMILY MEDICINE

## 2024-02-06 PROCEDURE — 36415 COLL VENOUS BLD VENIPUNCTURE: CPT

## 2024-02-18 RX ORDER — WARFARIN SODIUM 6 MG/1
6 TABLET ORAL DAILY
Qty: 90 TABLET | Refills: 3 | Status: SHIPPED | OUTPATIENT
Start: 2024-02-18

## 2024-02-19 RX ORDER — SOTALOL HYDROCHLORIDE 80 MG/1
80 TABLET ORAL 2 TIMES DAILY
Qty: 120 TABLET | Refills: 5 | Status: SHIPPED | OUTPATIENT
Start: 2024-02-19

## 2024-03-06 ENCOUNTER — CLINICAL SUPPORT NO REQUIREMENTS (OUTPATIENT)
Dept: CARDIOLOGY | Facility: CLINIC | Age: 79
End: 2024-03-06
Payer: MEDICARE

## 2024-03-06 DIAGNOSIS — I49.5 SICK SINUS SYNDROME: Primary | Chronic | ICD-10-CM

## 2024-03-06 DIAGNOSIS — Z95.0 PRESENCE OF CARDIAC PACEMAKER: Chronic | ICD-10-CM

## 2024-03-11 ENCOUNTER — TELEPHONE (OUTPATIENT)
Dept: FAMILY MEDICINE CLINIC | Facility: CLINIC | Age: 79
End: 2024-03-11
Payer: MEDICARE

## 2024-03-11 ENCOUNTER — LAB (OUTPATIENT)
Dept: FAMILY MEDICINE CLINIC | Facility: CLINIC | Age: 79
End: 2024-03-11
Payer: MEDICARE

## 2024-03-11 DIAGNOSIS — D68.4 COAGULATION FACTOR DEFICIENCY SYNDROME: ICD-10-CM

## 2024-03-11 LAB
INR PPP: 3 (ref 2–3)
PROTHROMBIN TIME: 30.1 SECONDS (ref 19.4–28.5)

## 2024-03-11 PROCEDURE — 36415 COLL VENOUS BLD VENIPUNCTURE: CPT

## 2024-03-11 PROCEDURE — 85610 PROTHROMBIN TIME: CPT | Performed by: FAMILY MEDICINE

## 2024-03-11 NOTE — TELEPHONE ENCOUNTER
Please Relay:  Left Arin Singh a detailed message with results, per verbal release.     INR looks good at 3.0. No changes. Plan to repeat next month

## 2024-03-19 NOTE — PROGRESS NOTES
DEVICE INTERROGATION:  IN OFFICE    DEVICE TYPE:   Dual-chamber pacemaker    :   [employer]    BATTERY:  Stable    TIME TO ELECTIVE REPLACEMENT INDICATORS:   Less than 3 months    CHARGE TIME:   Not applicable        LEAD DATA:       DEVICE REPROGRAMMED FOR TESTING      Atrial:   PAC ED mV, 608 ohms, 1.5 V@0.4 ms    Ventricular:     11 mV, 643 ohms, 1.1 V@0.4 ms    LV:      Pacemaker dependent:   Yes      Atrial pacing percentage: 50%    Ventricular pacing percentage: 100%      Arrhythmia Logbook Reviewed: No A-fib        Summary:    Stable Device Function    No significant arhythmia burden.     Battery status is stable.    Reviewed with: Dr. Faulkner      NEXT IN OFFICE DEVICE CHECK DUE: 3 months    REMOTE DEVICE INTERROGATIONS: Ongoing

## 2024-04-16 ENCOUNTER — LAB (OUTPATIENT)
Dept: FAMILY MEDICINE CLINIC | Facility: CLINIC | Age: 79
End: 2024-04-16
Payer: MEDICARE

## 2024-04-16 DIAGNOSIS — D68.4 COAGULATION FACTOR DEFICIENCY SYNDROME: ICD-10-CM

## 2024-04-16 LAB
INR PPP: 1.97 (ref 2–3)
PROTHROMBIN TIME: 20.4 SECONDS (ref 19.4–28.5)

## 2024-04-16 PROCEDURE — 36415 COLL VENOUS BLD VENIPUNCTURE: CPT

## 2024-04-16 PROCEDURE — 85610 PROTHROMBIN TIME: CPT | Performed by: FAMILY MEDICINE

## 2024-04-17 ENCOUNTER — TELEPHONE (OUTPATIENT)
Dept: FAMILY MEDICINE CLINIC | Facility: CLINIC | Age: 79
End: 2024-04-17
Payer: MEDICARE

## 2024-04-17 NOTE — TELEPHONE ENCOUNTER
Please Relay:  Left Arin Singh a detailed message with results, per verbal release.     INR looks good- no changes. Repeat in 1 month

## 2024-05-01 RX ORDER — LISINOPRIL AND HYDROCHLOROTHIAZIDE 20; 12.5 MG/1; MG/1
1 TABLET ORAL DAILY
Qty: 90 TABLET | Refills: 3 | Status: SHIPPED | OUTPATIENT
Start: 2024-05-01

## 2024-05-01 RX ORDER — VERAPAMIL HYDROCHLORIDE 240 MG/1
240 TABLET, FILM COATED, EXTENDED RELEASE ORAL DAILY
Qty: 90 TABLET | Refills: 3 | Status: SHIPPED | OUTPATIENT
Start: 2024-05-01

## 2024-05-20 ENCOUNTER — LAB (OUTPATIENT)
Dept: FAMILY MEDICINE CLINIC | Facility: CLINIC | Age: 79
End: 2024-05-20
Payer: MEDICARE

## 2024-05-20 DIAGNOSIS — D68.4 COAGULATION FACTOR DEFICIENCY SYNDROME: ICD-10-CM

## 2024-05-20 LAB
INR PPP: 2.41 (ref 2–3)
PROTHROMBIN TIME: 24.6 SECONDS (ref 19.4–28.5)

## 2024-05-20 PROCEDURE — 85610 PROTHROMBIN TIME: CPT | Performed by: FAMILY MEDICINE

## 2024-05-20 PROCEDURE — 36415 COLL VENOUS BLD VENIPUNCTURE: CPT

## 2024-06-03 DIAGNOSIS — Z45.010 ELECTIVE REPLACEMENT INDICATED FOR CARDIAC PACEMAKER BATTERY AT END OF LIFESPAN: Primary | ICD-10-CM

## 2024-06-03 NOTE — PROGRESS NOTES
Remote device transmission received.    Patient's device has triggered CORKY.    Patient has a [employer] dual-chamber pacemaker that was implanted in 2016.    She currently ventricularly paces 100% of the time.    Patient has paroxysmal atrial fibrillation which has been treated with sotalol.  Her A-fib burden is 3%.    She is anticoagulated with warfarin.  Additional indication for anticoagulation is thromboembolic disease with MTHFR gene mutationLVEF by last echocardiogram in July 2022 was 55 to 60% with diastolic dysfunction and mild pulmonary hypertension RVSP 35 to 45 mmHg    Will place referral to Dr. Helen Laughlin for evaluation for generator replacement, questionable upgrade to biventricular pacemaker.    Patient notified of referral.

## 2024-06-07 ENCOUNTER — LAB (OUTPATIENT)
Dept: FAMILY MEDICINE CLINIC | Facility: CLINIC | Age: 79
End: 2024-06-07
Payer: MEDICARE

## 2024-06-07 DIAGNOSIS — D68.4 COAGULATION FACTOR DEFICIENCY SYNDROME: ICD-10-CM

## 2024-06-07 LAB
INR PPP: 3.96 (ref 2–3)
PROTHROMBIN TIME: 39 SECONDS (ref 19.4–28.5)

## 2024-06-07 PROCEDURE — 85610 PROTHROMBIN TIME: CPT | Performed by: FAMILY MEDICINE

## 2024-06-07 PROCEDURE — 36415 COLL VENOUS BLD VENIPUNCTURE: CPT

## 2024-06-24 ENCOUNTER — LAB (OUTPATIENT)
Dept: FAMILY MEDICINE CLINIC | Facility: CLINIC | Age: 79
End: 2024-06-24
Payer: MEDICARE

## 2024-06-24 DIAGNOSIS — D68.4 COAGULATION FACTOR DEFICIENCY SYNDROME: ICD-10-CM

## 2024-06-24 LAB
INR PPP: 2.18 (ref 2–3)
PROTHROMBIN TIME: 22.4 SECONDS (ref 19.4–28.5)

## 2024-06-24 PROCEDURE — 85610 PROTHROMBIN TIME: CPT | Performed by: FAMILY MEDICINE

## 2024-06-24 PROCEDURE — 36415 COLL VENOUS BLD VENIPUNCTURE: CPT

## 2024-06-26 ENCOUNTER — OFFICE VISIT (OUTPATIENT)
Dept: CARDIOLOGY | Facility: CLINIC | Age: 79
End: 2024-06-26
Payer: MEDICARE

## 2024-06-26 ENCOUNTER — PREP FOR SURGERY (OUTPATIENT)
Dept: OTHER | Facility: HOSPITAL | Age: 79
End: 2024-06-26
Payer: MEDICARE

## 2024-06-26 VITALS
BODY MASS INDEX: 45.99 KG/M2 | HEART RATE: 70 BPM | SYSTOLIC BLOOD PRESSURE: 126 MMHG | WEIGHT: 293 LBS | DIASTOLIC BLOOD PRESSURE: 70 MMHG | HEIGHT: 67 IN | OXYGEN SATURATION: 94 %

## 2024-06-26 DIAGNOSIS — I49.5 SICK SINUS SYNDROME: Primary | Chronic | ICD-10-CM

## 2024-06-26 DIAGNOSIS — I48.0 PAROXYSMAL ATRIAL FIBRILLATION: ICD-10-CM

## 2024-06-26 DIAGNOSIS — I10 ESSENTIAL HYPERTENSION: ICD-10-CM

## 2024-06-26 DIAGNOSIS — G47.33 OSA ON CPAP: ICD-10-CM

## 2024-06-26 PROCEDURE — 3078F DIAST BP <80 MM HG: CPT | Performed by: INTERNAL MEDICINE

## 2024-06-26 PROCEDURE — 3074F SYST BP LT 130 MM HG: CPT | Performed by: INTERNAL MEDICINE

## 2024-06-26 PROCEDURE — 99213 OFFICE O/P EST LOW 20 MIN: CPT | Performed by: INTERNAL MEDICINE

## 2024-06-26 PROCEDURE — 1159F MED LIST DOCD IN RCRD: CPT | Performed by: INTERNAL MEDICINE

## 2024-06-26 PROCEDURE — 1160F RVW MEDS BY RX/DR IN RCRD: CPT | Performed by: INTERNAL MEDICINE

## 2024-06-26 NOTE — PROGRESS NOTES
Progress note      Name: Arin Singh ADMIT: (Not on file)   : 1945  PCP: Albin Guillen MD    MRN: [id number] LOS: 0 days   AGE/SEX: 78 y.o. female  ROOM: Room/bed info not found     Chief Complaint   Patient presents with    Consult     NP-battery change        Subjective       History of present illness  Arin Singh is an 78-year-old female patient who has history of sick sinus syndrome, paroxysmal atrial fibrillation, has dual-chamber pacemaker [employer].  Patient is paced 100% of the time in the V, 50% in A.  Likely that she is in heart block also.  Her battery is at CORKY.    Past Medical History:   Diagnosis Date    Cataract     Surgery both eyes     Coagulation factor deficiency syndrome 2013    Coronary artery disease     Afib pacemaker 2016    Hyperlipidemia 2019    Hypertension 2019    Infectious viral hepatitis A     Obesity     Osteoarthritis of knee 10/07/2015    Other pulmonary embolism without acute cor pulmonale 2013    Paroxysmal atrial fibrillation 10/04/2017    Presence of cardiac pacemaker 2019    Sick sinus syndrome 2019    Sleep apnea     Cpap compliant    Vitamin B12 deficiency 2017    Vitamin D deficiency 2018     Past Surgical History:   Procedure Laterality Date    BREAST SURGERY  1963 biopsy    COLONOSCOPY      EYE SURGERY   cataracts    INSERT / REPLACE / REMOVE PACEMAKER      BS AV Sequential    JOINT REPLACEMENT  2015    Right knee    REPLACEMENT TOTAL KNEE Right      Family History   Problem Relation Age of Onset    Heart attack Mother      Social History     Tobacco Use    Smoking status: Never     Passive exposure: Never    Smokeless tobacco: Never   Vaping Use    Vaping status: Never Used   Substance Use Topics    Alcohol use: Yes     Comment: occassionally- few times a year    Drug use: No       Current Outpatient Medications:     lisinopril-hydrochlorothiazide  (PRINZIDE,ZESTORETIC) 20-12.5 MG per tablet, TAKE 1 TABLET BY MOUTH DAILY, Disp: 90 tablet, Rfl: 3    multivitamin with minerals (EYE VITAMINS & MINERALS PO), Take 1 tablet by mouth Daily., Disp: , Rfl:     sotalol (BETAPACE AF) 80 MG tablet tablet, TAKE 1 TABLET BY MOUTH TWICE  DAILY, Disp: 120 tablet, Rfl: 5    verapamil SR (CALAN-SR) 240 MG CR tablet, TAKE 1 TABLET BY MOUTH DAILY, Disp: 90 tablet, Rfl: 3    warfarin (COUMADIN) 6 MG tablet, TAKE 1 TABLET BY MOUTH DAILY AS  DIRECTED, Disp: 90 tablet, Rfl: 3  Allergies:  Patient has no known allergies.      Physical Exam  VITALS REVIEWED    General:      well developed, in no acute distress.    Head:      normocephalic and atraumatic.    Eyes:      PERRL/EOM intact, conjunctiva and sclera clear with out nystagmus.    Neck:      no masses, thyromegaly,  trachea central with normal respiratory effort and PMI displaced laterally  Lungs:      Clear to auscultation bilaterally  Heart:       Regular rate and rhythm  Msk:      no deformity or scoliosis noted of thoracic or lumbar spine.    Pulses:      pulses normal in all 4 extremities.    Extremities:       No lower extremity edema  Neurologic:      no focal deficits.   alert oriented x3  Skin:      intact without lesions or rashes.    Psych:      alert and cooperative; normal mood and affect; normal attention span and concentration.      Result Review :               Pertinent cardiac workup    Echo 7/25/2022 ejection fraction 55 to 60%.      Procedures        Assessment and Plan      Arin Singh is an 78-year-old female patient who has sick sinus syndrome, paroxysmal atrial fibrillation, has dual-chamber pacemaker.  She is having some episodes of A-fib, but she is on sotalol and for the most part she is in sinus rhythm.  She is 50% paced in the atrium but 100% in the V and no R waves are recorded, probably she is in complete heart block as well.  Her battery is at CORKY.  Patient does not have CHF symptoms and her  ejection fraction is normal.  We will proceed with dual-chamber pacemaker generator change out.    Diagnoses and all orders for this visit:    1. Sick sinus syndrome (Primary)    2. Paroxysmal atrial fibrillation    3. YESY on CPAP    4. Essential hypertension           No follow-ups on file.  Patient was given instructions and counseling regarding her condition or for health maintenance advice. Please see specific information pulled into the AVS if appropriate.       Electronically signed by Hakan Ríos MD, 06/26/24, 3:51 PM EDT.

## 2024-06-26 NOTE — H&P (VIEW-ONLY)
Progress note      Name: Arin Singh ADMIT: (Not on file)   : 1945  PCP: Albin Guillen MD    MRN: [id number] LOS: 0 days   AGE/SEX: 78 y.o. female  ROOM: Room/bed info not found     Chief Complaint   Patient presents with    Consult     NP-battery change        Subjective       History of present illness  Arin Singh is an 78-year-old female patient who has history of sick sinus syndrome, paroxysmal atrial fibrillation, has dual-chamber pacemaker [employer].  Patient is paced 100% of the time in the V, 50% in A.  Likely that she is in heart block also.  Her battery is at CORKY.    Past Medical History:   Diagnosis Date    Cataract     Surgery both eyes     Coagulation factor deficiency syndrome 2013    Coronary artery disease     Afib pacemaker 2016    Hyperlipidemia 2019    Hypertension 2019    Infectious viral hepatitis A     Obesity     Osteoarthritis of knee 10/07/2015    Other pulmonary embolism without acute cor pulmonale 2013    Paroxysmal atrial fibrillation 10/04/2017    Presence of cardiac pacemaker 2019    Sick sinus syndrome 2019    Sleep apnea     Cpap compliant    Vitamin B12 deficiency 2017    Vitamin D deficiency 2018     Past Surgical History:   Procedure Laterality Date    BREAST SURGERY  1963 biopsy    COLONOSCOPY      EYE SURGERY   cataracts    INSERT / REPLACE / REMOVE PACEMAKER      BS AV Sequential    JOINT REPLACEMENT  2015    Right knee    REPLACEMENT TOTAL KNEE Right      Family History   Problem Relation Age of Onset    Heart attack Mother      Social History     Tobacco Use    Smoking status: Never     Passive exposure: Never    Smokeless tobacco: Never   Vaping Use    Vaping status: Never Used   Substance Use Topics    Alcohol use: Yes     Comment: occassionally- few times a year    Drug use: No       Current Outpatient Medications:     lisinopril-hydrochlorothiazide  (PRINZIDE,ZESTORETIC) 20-12.5 MG per tablet, TAKE 1 TABLET BY MOUTH DAILY, Disp: 90 tablet, Rfl: 3    multivitamin with minerals (EYE VITAMINS & MINERALS PO), Take 1 tablet by mouth Daily., Disp: , Rfl:     sotalol (BETAPACE AF) 80 MG tablet tablet, TAKE 1 TABLET BY MOUTH TWICE  DAILY, Disp: 120 tablet, Rfl: 5    verapamil SR (CALAN-SR) 240 MG CR tablet, TAKE 1 TABLET BY MOUTH DAILY, Disp: 90 tablet, Rfl: 3    warfarin (COUMADIN) 6 MG tablet, TAKE 1 TABLET BY MOUTH DAILY AS  DIRECTED, Disp: 90 tablet, Rfl: 3  Allergies:  Patient has no known allergies.      Physical Exam  VITALS REVIEWED    General:      well developed, in no acute distress.    Head:      normocephalic and atraumatic.    Eyes:      PERRL/EOM intact, conjunctiva and sclera clear with out nystagmus.    Neck:      no masses, thyromegaly,  trachea central with normal respiratory effort and PMI displaced laterally  Lungs:      Clear to auscultation bilaterally  Heart:       Regular rate and rhythm  Msk:      no deformity or scoliosis noted of thoracic or lumbar spine.    Pulses:      pulses normal in all 4 extremities.    Extremities:       No lower extremity edema  Neurologic:      no focal deficits.   alert oriented x3  Skin:      intact without lesions or rashes.    Psych:      alert and cooperative; normal mood and affect; normal attention span and concentration.      Result Review :               Pertinent cardiac workup    Echo 7/25/2022 ejection fraction 55 to 60%.      Procedures        Assessment and Plan      Arin Singh is an 78-year-old female patient who has sick sinus syndrome, paroxysmal atrial fibrillation, has dual-chamber pacemaker.  She is having some episodes of A-fib, but she is on sotalol and for the most part she is in sinus rhythm.  She is 50% paced in the atrium but 100% in the V and no R waves are recorded, probably she is in complete heart block as well.  Her battery is at CORKY.  Patient does not have CHF symptoms and her  ejection fraction is normal.  We will proceed with dual-chamber pacemaker generator change out.    Diagnoses and all orders for this visit:    1. Sick sinus syndrome (Primary)    2. Paroxysmal atrial fibrillation    3. YESY on CPAP    4. Essential hypertension           No follow-ups on file.  Patient was given instructions and counseling regarding her condition or for health maintenance advice. Please see specific information pulled into the AVS if appropriate.       Electronically signed by Hakan Ríos MD, 06/26/24, 3:51 PM EDT.

## 2024-06-28 ENCOUNTER — PATIENT ROUNDING (BHMG ONLY) (OUTPATIENT)
Dept: CARDIOLOGY | Facility: CLINIC | Age: 79
End: 2024-06-28
Payer: MEDICARE

## 2024-07-12 ENCOUNTER — TELEPHONE (OUTPATIENT)
Dept: CARDIOLOGY | Facility: CLINIC | Age: 79
End: 2024-07-12
Payer: MEDICARE

## 2024-07-12 ENCOUNTER — LAB (OUTPATIENT)
Dept: LAB | Facility: HOSPITAL | Age: 79
End: 2024-07-12
Payer: MEDICARE

## 2024-07-12 DIAGNOSIS — I49.5 SICK SINUS SYNDROME: ICD-10-CM

## 2024-07-12 LAB
ALBUMIN SERPL-MCNC: 3.8 G/DL (ref 3.5–5.2)
ALBUMIN/GLOB SERPL: 1.1 G/DL
ALP SERPL-CCNC: 83 U/L (ref 39–117)
ALT SERPL W P-5'-P-CCNC: 13 U/L (ref 1–33)
ANION GAP SERPL CALCULATED.3IONS-SCNC: 11.4 MMOL/L (ref 5–15)
AST SERPL-CCNC: 21 U/L (ref 1–32)
BASOPHILS # BLD AUTO: 0.05 10*3/MM3 (ref 0–0.2)
BASOPHILS NFR BLD AUTO: 0.9 % (ref 0–1.5)
BILIRUB SERPL-MCNC: 0.5 MG/DL (ref 0–1.2)
BUN SERPL-MCNC: 21 MG/DL (ref 8–23)
BUN/CREAT SERPL: 20.4 (ref 7–25)
CALCIUM SPEC-SCNC: 9.7 MG/DL (ref 8.6–10.5)
CHLORIDE SERPL-SCNC: 106 MMOL/L (ref 98–107)
CO2 SERPL-SCNC: 23.6 MMOL/L (ref 22–29)
CREAT SERPL-MCNC: 1.03 MG/DL (ref 0.57–1)
DEPRECATED RDW RBC AUTO: 47.2 FL (ref 37–54)
EGFRCR SERPLBLD CKD-EPI 2021: 55.1 ML/MIN/1.73
EOSINOPHIL # BLD AUTO: 0.16 10*3/MM3 (ref 0–0.4)
EOSINOPHIL NFR BLD AUTO: 2.9 % (ref 0.3–6.2)
ERYTHROCYTE [DISTWIDTH] IN BLOOD BY AUTOMATED COUNT: 13.4 % (ref 12.3–15.4)
GLOBULIN UR ELPH-MCNC: 3.4 GM/DL
GLUCOSE SERPL-MCNC: 105 MG/DL (ref 65–99)
HCT VFR BLD AUTO: 43.3 % (ref 34–46.6)
HGB BLD-MCNC: 14.2 G/DL (ref 12–15.9)
IMM GRANULOCYTES # BLD AUTO: 0 10*3/MM3 (ref 0–0.05)
IMM GRANULOCYTES NFR BLD AUTO: 0 % (ref 0–0.5)
INR PPP: 2.9 (ref 0.93–1.1)
LYMPHOCYTES # BLD AUTO: 2.14 10*3/MM3 (ref 0.7–3.1)
LYMPHOCYTES NFR BLD AUTO: 38.4 % (ref 19.6–45.3)
MAGNESIUM SERPL-MCNC: 1.9 MG/DL (ref 1.6–2.4)
MCH RBC QN AUTO: 31.4 PG (ref 26.6–33)
MCHC RBC AUTO-ENTMCNC: 32.8 G/DL (ref 31.5–35.7)
MCV RBC AUTO: 95.8 FL (ref 79–97)
MONOCYTES # BLD AUTO: 0.57 10*3/MM3 (ref 0.1–0.9)
MONOCYTES NFR BLD AUTO: 10.2 % (ref 5–12)
NEUTROPHILS NFR BLD AUTO: 2.66 10*3/MM3 (ref 1.7–7)
NEUTROPHILS NFR BLD AUTO: 47.6 % (ref 42.7–76)
NRBC BLD AUTO-RTO: 0 /100 WBC (ref 0–0.2)
PLATELET # BLD AUTO: 182 10*3/MM3 (ref 140–450)
PMV BLD AUTO: 11.7 FL (ref 6–12)
POTASSIUM SERPL-SCNC: 4.1 MMOL/L (ref 3.5–5.2)
PROT SERPL-MCNC: 7.2 G/DL (ref 6–8.5)
PROTHROMBIN TIME: 29.2 SECONDS (ref 9.6–11.7)
RBC # BLD AUTO: 4.52 10*6/MM3 (ref 3.77–5.28)
SODIUM SERPL-SCNC: 141 MMOL/L (ref 136–145)
WBC NRBC COR # BLD AUTO: 5.58 10*3/MM3 (ref 3.4–10.8)

## 2024-07-12 PROCEDURE — 80053 COMPREHEN METABOLIC PANEL: CPT

## 2024-07-12 PROCEDURE — 36415 COLL VENOUS BLD VENIPUNCTURE: CPT

## 2024-07-12 PROCEDURE — 83735 ASSAY OF MAGNESIUM: CPT

## 2024-07-12 PROCEDURE — 85610 PROTHROMBIN TIME: CPT

## 2024-07-12 PROCEDURE — 85025 COMPLETE CBC W/AUTO DIFF WBC: CPT

## 2024-07-12 NOTE — TELEPHONE ENCOUNTER
This INR was done as part of pre-op labs for an upcoming generator change on Monday. We do not manage pts INR or Warfarin dosage. Please advise pt accordingly. Thanks. Kathy BENZ

## 2024-07-14 ENCOUNTER — ANESTHESIA EVENT (OUTPATIENT)
Dept: CARDIOLOGY | Facility: HOSPITAL | Age: 79
End: 2024-07-14
Payer: MEDICARE

## 2024-07-15 ENCOUNTER — ANESTHESIA (OUTPATIENT)
Dept: CARDIOLOGY | Facility: HOSPITAL | Age: 79
End: 2024-07-15
Payer: MEDICARE

## 2024-07-15 ENCOUNTER — HOSPITAL ENCOUNTER (OUTPATIENT)
Facility: HOSPITAL | Age: 79
Setting detail: HOSPITAL OUTPATIENT SURGERY
Discharge: HOME | End: 2024-07-15
Attending: INTERNAL MEDICINE | Admitting: INTERNAL MEDICINE
Payer: MEDICARE

## 2024-07-15 VITALS
BODY MASS INDEX: 47.09 KG/M2 | DIASTOLIC BLOOD PRESSURE: 69 MMHG | SYSTOLIC BLOOD PRESSURE: 121 MMHG | WEIGHT: 293 LBS | RESPIRATION RATE: 22 BRPM | TEMPERATURE: 97.9 F | HEART RATE: 70 BPM | HEIGHT: 66 IN | OXYGEN SATURATION: 94 %

## 2024-07-15 DIAGNOSIS — I49.5 SICK SINUS SYNDROME: ICD-10-CM

## 2024-07-15 LAB — MRSA DNA SPEC QL NAA+PROBE: NORMAL

## 2024-07-15 PROCEDURE — C1785 PMKR, DUAL, RATE-RESP: HCPCS | Performed by: INTERNAL MEDICINE

## 2024-07-15 PROCEDURE — 25010000002 PROPOFOL 10 MG/ML EMULSION: Performed by: NURSE ANESTHETIST, CERTIFIED REGISTERED

## 2024-07-15 PROCEDURE — 25010000002 CEFAZOLIN 3 G RECONSTITUTED SOLUTION 1 EACH VIAL: Performed by: INTERNAL MEDICINE

## 2024-07-15 PROCEDURE — 87641 MR-STAPH DNA AMP PROBE: CPT | Performed by: INTERNAL MEDICINE

## 2024-07-15 PROCEDURE — 25810000003 SODIUM CHLORIDE 0.9 % SOLUTION: Performed by: NURSE ANESTHETIST, CERTIFIED REGISTERED

## 2024-07-15 PROCEDURE — C1889 IMPLANT/INSERT DEVICE, NOC: HCPCS | Performed by: INTERNAL MEDICINE

## 2024-07-15 PROCEDURE — 33228 REMV&REPLC PM GEN DUAL LEAD: CPT | Performed by: INTERNAL MEDICINE

## 2024-07-15 DEVICE — PACEMAKER
Type: IMPLANTABLE DEVICE | Site: CHEST WALL | Status: FUNCTIONAL
Brand: ACCOLADE™ MRI EL DR

## 2024-07-15 DEVICE — ENV PM AIGISRX ANTIBAC RESORB 2.5X2.7IN MD: Type: IMPLANTABLE DEVICE | Site: CHEST WALL | Status: FUNCTIONAL

## 2024-07-15 RX ORDER — DROPERIDOL 2.5 MG/ML
0.62 INJECTION, SOLUTION INTRAMUSCULAR; INTRAVENOUS ONCE AS NEEDED
Status: DISCONTINUED | OUTPATIENT
Start: 2024-07-15 | End: 2024-07-15 | Stop reason: HOSPADM

## 2024-07-15 RX ORDER — LIDOCAINE HYDROCHLORIDE 20 MG/ML
INJECTION, SOLUTION EPIDURAL; INFILTRATION; INTRACAUDAL; PERINEURAL AS NEEDED
Status: DISCONTINUED | OUTPATIENT
Start: 2024-07-15 | End: 2024-07-15 | Stop reason: SURG

## 2024-07-15 RX ORDER — MEPERIDINE HYDROCHLORIDE 25 MG/ML
12.5 INJECTION INTRAMUSCULAR; INTRAVENOUS; SUBCUTANEOUS
Status: DISCONTINUED | OUTPATIENT
Start: 2024-07-15 | End: 2024-07-15 | Stop reason: HOSPADM

## 2024-07-15 RX ORDER — ACETAMINOPHEN 650 MG/1
650 SUPPOSITORY RECTAL EVERY 4 HOURS PRN
Status: DISCONTINUED | OUTPATIENT
Start: 2024-07-15 | End: 2024-07-15 | Stop reason: HOSPADM

## 2024-07-15 RX ORDER — FENTANYL CITRATE 50 UG/ML
25 INJECTION, SOLUTION INTRAMUSCULAR; INTRAVENOUS
Status: DISCONTINUED | OUTPATIENT
Start: 2024-07-15 | End: 2024-07-15 | Stop reason: HOSPADM

## 2024-07-15 RX ORDER — HYDRALAZINE HYDROCHLORIDE 20 MG/ML
5 INJECTION INTRAMUSCULAR; INTRAVENOUS
Status: DISCONTINUED | OUTPATIENT
Start: 2024-07-15 | End: 2024-07-15 | Stop reason: HOSPADM

## 2024-07-15 RX ORDER — ACETAMINOPHEN 325 MG/1
650 TABLET ORAL ONCE AS NEEDED
Status: DISCONTINUED | OUTPATIENT
Start: 2024-07-15 | End: 2024-07-15 | Stop reason: HOSPADM

## 2024-07-15 RX ORDER — PROMETHAZINE HYDROCHLORIDE 25 MG/1
25 TABLET ORAL ONCE AS NEEDED
Status: DISCONTINUED | OUTPATIENT
Start: 2024-07-15 | End: 2024-07-15 | Stop reason: HOSPADM

## 2024-07-15 RX ORDER — DIPHENHYDRAMINE HYDROCHLORIDE 50 MG/ML
12.5 INJECTION INTRAMUSCULAR; INTRAVENOUS
Status: DISCONTINUED | OUTPATIENT
Start: 2024-07-15 | End: 2024-07-15 | Stop reason: HOSPADM

## 2024-07-15 RX ORDER — NALOXONE HCL 0.4 MG/ML
0.4 VIAL (ML) INJECTION AS NEEDED
Status: DISCONTINUED | OUTPATIENT
Start: 2024-07-15 | End: 2024-07-15 | Stop reason: HOSPADM

## 2024-07-15 RX ORDER — CEPHALEXIN 500 MG/1
500 CAPSULE ORAL 2 TIMES DAILY
Qty: 10 CAPSULE | Refills: 0 | Status: SHIPPED | OUTPATIENT
Start: 2024-07-15 | End: 2024-07-20

## 2024-07-15 RX ORDER — SODIUM CHLORIDE 9 MG/ML
INJECTION, SOLUTION INTRAVENOUS CONTINUOUS PRN
Status: DISCONTINUED | OUTPATIENT
Start: 2024-07-15 | End: 2024-07-15 | Stop reason: SURG

## 2024-07-15 RX ORDER — LIDOCAINE HYDROCHLORIDE AND EPINEPHRINE BITARTRATE 20; .01 MG/ML; MG/ML
INJECTION, SOLUTION SUBCUTANEOUS
Status: DISCONTINUED | OUTPATIENT
Start: 2024-07-15 | End: 2024-07-15 | Stop reason: HOSPADM

## 2024-07-15 RX ORDER — DIPHENHYDRAMINE HYDROCHLORIDE 50 MG/ML
12.5 INJECTION INTRAMUSCULAR; INTRAVENOUS ONCE AS NEEDED
Status: DISCONTINUED | OUTPATIENT
Start: 2024-07-15 | End: 2024-07-15 | Stop reason: HOSPADM

## 2024-07-15 RX ORDER — PHENYLEPHRINE HCL IN 0.9% NACL 1 MG/10 ML
SYRINGE (ML) INTRAVENOUS AS NEEDED
Status: DISCONTINUED | OUTPATIENT
Start: 2024-07-15 | End: 2024-07-15 | Stop reason: SURG

## 2024-07-15 RX ORDER — HYDROCODONE BITARTRATE AND ACETAMINOPHEN 5; 325 MG/1; MG/1
1 TABLET ORAL ONCE AS NEEDED
Status: DISCONTINUED | OUTPATIENT
Start: 2024-07-15 | End: 2024-07-15 | Stop reason: HOSPADM

## 2024-07-15 RX ORDER — PROMETHAZINE HYDROCHLORIDE 25 MG/1
25 SUPPOSITORY RECTAL ONCE AS NEEDED
Status: DISCONTINUED | OUTPATIENT
Start: 2024-07-15 | End: 2024-07-15 | Stop reason: HOSPADM

## 2024-07-15 RX ORDER — ONDANSETRON 2 MG/ML
4 INJECTION INTRAMUSCULAR; INTRAVENOUS ONCE AS NEEDED
Status: DISCONTINUED | OUTPATIENT
Start: 2024-07-15 | End: 2024-07-15 | Stop reason: HOSPADM

## 2024-07-15 RX ORDER — LABETALOL HYDROCHLORIDE 5 MG/ML
5 INJECTION, SOLUTION INTRAVENOUS
Status: DISCONTINUED | OUTPATIENT
Start: 2024-07-15 | End: 2024-07-15 | Stop reason: HOSPADM

## 2024-07-15 RX ORDER — ALBUTEROL SULFATE 2.5 MG/3ML
2.5 SOLUTION RESPIRATORY (INHALATION) ONCE AS NEEDED
Status: DISCONTINUED | OUTPATIENT
Start: 2024-07-15 | End: 2024-07-15 | Stop reason: HOSPADM

## 2024-07-15 RX ADMIN — Medication 100 MCG: at 15:46

## 2024-07-15 RX ADMIN — SODIUM CHLORIDE: 9 INJECTION, SOLUTION INTRAVENOUS at 15:47

## 2024-07-15 RX ADMIN — LIDOCAINE HYDROCHLORIDE 60 MG: 20 INJECTION, SOLUTION EPIDURAL; INFILTRATION; INTRACAUDAL; PERINEURAL at 15:15

## 2024-07-15 RX ADMIN — SODIUM CHLORIDE 3000 MG: 900 INJECTION INTRAVENOUS at 15:00

## 2024-07-15 RX ADMIN — Medication 100 MCG: at 15:36

## 2024-07-15 RX ADMIN — PROPOFOL 125 MCG/KG/MIN: 10 INJECTION, EMULSION INTRAVENOUS at 15:15

## 2024-07-15 RX ADMIN — Medication 100 MCG: at 15:58

## 2024-07-15 RX ADMIN — Medication 100 MCG: at 15:31

## 2024-07-15 RX ADMIN — SODIUM CHLORIDE: 9 INJECTION, SOLUTION INTRAVENOUS at 15:05

## 2024-07-15 NOTE — ANESTHESIA POSTPROCEDURE EVALUATION
Patient: Arin Singh    Procedure Summary       Date: 07/15/24 Room / Location: [place] CATH LAB 3 /  [facility] INVASIVE LOCATION    Anesthesia Start: 1505 Anesthesia Stop: 1607    Procedure: PPM generator change - dual, [place] - REPS EMAILED Diagnosis:       Sick sinus syndrome      (complete heart block)    Providers: Hakan Ríos MD Provider: Volodymyr Garcia MD    Anesthesia Type: general ASA Status: 3            Anesthesia Type: general    Vitals  Vitals Value Taken Time   /69 07/15/24 1701   Temp     Pulse 70 07/15/24 1716   Resp 22 07/15/24 1616   SpO2 94 % 07/15/24 1716   Vitals shown include unfiled device data.        Post Anesthesia Care and Evaluation    Patient location during evaluation: PACU  Patient participation: complete - patient participated  Level of consciousness: awake  Pain scale: See nurse's notes for pain score.  Pain management: adequate    Airway patency: patent  Anesthetic complications: No anesthetic complications  PONV Status: none  Cardiovascular status: acceptable  Respiratory status: acceptable and spontaneous ventilation  Hydration status: acceptable    Comments: Patient seen and examined postoperatively; vital signs stable; SpO2 greater than or equal to 90%; cardiopulmonary status stable; nausea/vomiting adequately controlled; pain adequately controlled; no apparent anesthesia complications; patient discharged from anesthesia care when discharge criteria were met

## 2024-07-15 NOTE — NURSING NOTE
Discussed discharge instructions with patient and her daughter; they voice understanding; left upper chest area drsg is clean and dry; discharged patient home with family

## 2024-07-18 ENCOUNTER — OFFICE VISIT (OUTPATIENT)
Dept: FAMILY MEDICINE CLINIC | Facility: CLINIC | Age: 79
End: 2024-07-18
Payer: MEDICARE

## 2024-07-18 VITALS
RESPIRATION RATE: 19 BRPM | DIASTOLIC BLOOD PRESSURE: 82 MMHG | OXYGEN SATURATION: 95 % | HEART RATE: 88 BPM | SYSTOLIC BLOOD PRESSURE: 126 MMHG | BODY MASS INDEX: 47.09 KG/M2 | WEIGHT: 293 LBS | HEIGHT: 66 IN

## 2024-07-18 DIAGNOSIS — I49.5 SICK SINUS SYNDROME: Chronic | ICD-10-CM

## 2024-07-18 DIAGNOSIS — K44.9 HIATAL HERNIA: Primary | ICD-10-CM

## 2024-07-18 PROCEDURE — 99213 OFFICE O/P EST LOW 20 MIN: CPT | Performed by: FAMILY MEDICINE

## 2024-07-18 PROCEDURE — 1160F RVW MEDS BY RX/DR IN RCRD: CPT | Performed by: FAMILY MEDICINE

## 2024-07-18 PROCEDURE — 1159F MED LIST DOCD IN RCRD: CPT | Performed by: FAMILY MEDICINE

## 2024-07-18 PROCEDURE — 3074F SYST BP LT 130 MM HG: CPT | Performed by: FAMILY MEDICINE

## 2024-07-18 PROCEDURE — 3079F DIAST BP 80-89 MM HG: CPT | Performed by: FAMILY MEDICINE

## 2024-07-18 NOTE — PROGRESS NOTES
Chief Complaint   Patient presents with    Follow-up     Follow up from Urgent care     HPI  Arin Singh is a 78 y.o. female that presents for   Chief Complaint   Patient presents with    Follow-up     Follow up from Urgent care     Hiatal hernia: patient was evaluated for respiratory infection in late May 2024. CXR was obtained and revealed large hiatal hernia. She has concerns about this finding. Patient reports rare heartburn- less than monthly. Not maintained on any medication regularly at this time.    Review of Systems  Pertinent positives of ROS documented in [facility]    The following portions of the patient's history were reviewed and updated as appropriate: problem list, past medical history, past surgical history, allergies, current medication    Problem List Tab  Patient History Tab  Immunizations Tab  Medications Tab  Chart Review Tab  Care Everywhere Tab  Synopsis Tab    PE  Vitals:    07/18/24 1405   BP: 126/82   Pulse: 88   Resp: 19   SpO2: 95%     Body mass index is 47.49 kg/m².  General: Obese, NAD  Head: AT/NC  Eyes: EOMI, anicteric sclera  Resp: CTAB, SCR, BS equal  CV: RRR w/o m/r/g; 2+ pulses  GI: Soft, NT/ND, +BS  MSK: FROM, no deformity, no edema  Skin: Warm, dry, intact. Bruising to L upper chest w/ incision c/d/i  Neuro: Alert and oriented. No focal deficits  Psych: Appropriate mood and affect    Imaging  XR Chest 2 View    Result Date: 5/26/2024  Impression: 1.Cardiomegaly with large retrocardiac hiatal hernia. Electronically Signed: Sixto Mustafa MD  5/26/2024 4:24 PM EDT  Workstation ID: [id number]    Assessment & Plan   Arin Singh is a 78 y.o. female that presents for   Chief Complaint   Patient presents with    Follow-up     Follow up from Urgent care     Diagnoses and all orders for this visit:    1. Hiatal hernia (Primary)   - Counseled and Reassured. No intervention needed   - Plan for TUMS/Rolaids or Pepcid PRN    2. Sick sinus syndrome: s/p pacemaker generator change. Site c/d/i  w/ no complications apparent   - Monitor. Cont CARDS f/u     Return if symptoms worsen or fail to improve.

## 2024-07-25 ENCOUNTER — CLINICAL SUPPORT NO REQUIREMENTS (OUTPATIENT)
Dept: CARDIOLOGY | Facility: CLINIC | Age: 79
End: 2024-07-25
Payer: MEDICARE

## 2024-07-25 DIAGNOSIS — I49.5 SICK SINUS SYNDROME: Chronic | ICD-10-CM

## 2024-07-25 DIAGNOSIS — Z95.0 PRESENCE OF CARDIAC PACEMAKER: Primary | Chronic | ICD-10-CM

## 2024-07-25 NOTE — PROGRESS NOTES
Patient is here today s/p pacemaker replacement. Interrogation of device is normal. Leads stable. Removed steri strips with no issues. Incision is healed, well approximated, no redness or drainage noted. Patient has slight swelling and bruising that is resolving. Patient has a follow up scheduled with Dr Berger 9/10/24.

## 2024-08-14 ENCOUNTER — LAB (OUTPATIENT)
Dept: FAMILY MEDICINE CLINIC | Facility: CLINIC | Age: 79
End: 2024-08-14
Payer: MEDICARE

## 2024-08-14 DIAGNOSIS — I48.0 PAROXYSMAL ATRIAL FIBRILLATION: Primary | ICD-10-CM

## 2024-08-14 DIAGNOSIS — I48.0 PAROXYSMAL ATRIAL FIBRILLATION: ICD-10-CM

## 2024-08-14 LAB
INR PPP: 2.73 (ref 2–3)
PROTHROMBIN TIME: 27.6 SECONDS (ref 19.4–28.5)

## 2024-08-14 PROCEDURE — 85610 PROTHROMBIN TIME: CPT | Performed by: FAMILY MEDICINE

## 2024-08-14 PROCEDURE — 36415 COLL VENOUS BLD VENIPUNCTURE: CPT

## 2024-08-15 PROCEDURE — 93294 REM INTERROG EVL PM/LDLS PM: CPT | Performed by: NURSE PRACTITIONER

## 2024-08-15 PROCEDURE — 93296 REM INTERROG EVL PM/IDS: CPT | Performed by: NURSE PRACTITIONER

## 2024-09-19 ENCOUNTER — LAB (OUTPATIENT)
Dept: FAMILY MEDICINE CLINIC | Facility: CLINIC | Age: 79
End: 2024-09-19
Payer: MEDICARE

## 2024-09-19 DIAGNOSIS — I48.0 PAROXYSMAL ATRIAL FIBRILLATION: ICD-10-CM

## 2024-09-19 LAB
INR PPP: 4.48 (ref 2–3)
PROTHROMBIN TIME: 43.7 SECONDS (ref 19.4–28.5)

## 2024-09-19 PROCEDURE — 36415 COLL VENOUS BLD VENIPUNCTURE: CPT

## 2024-09-19 PROCEDURE — 85610 PROTHROMBIN TIME: CPT | Performed by: FAMILY MEDICINE

## 2024-10-04 ENCOUNTER — LAB (OUTPATIENT)
Dept: FAMILY MEDICINE CLINIC | Facility: CLINIC | Age: 79
End: 2024-10-04
Payer: MEDICARE

## 2024-10-04 ENCOUNTER — CLINICAL SUPPORT (OUTPATIENT)
Dept: FAMILY MEDICINE CLINIC | Facility: CLINIC | Age: 79
End: 2024-10-04
Payer: MEDICARE

## 2024-10-04 DIAGNOSIS — Z23 NEED FOR IMMUNIZATION AGAINST INFLUENZA: Primary | ICD-10-CM

## 2024-10-04 DIAGNOSIS — I48.0 PAROXYSMAL ATRIAL FIBRILLATION: ICD-10-CM

## 2024-10-04 LAB
INR PPP: 2.46 (ref 2–3)
PROTHROMBIN TIME: 25.1 SECONDS (ref 19.4–28.5)

## 2024-10-04 PROCEDURE — 90662 IIV NO PRSV INCREASED AG IM: CPT | Performed by: FAMILY MEDICINE

## 2024-10-04 PROCEDURE — 36415 COLL VENOUS BLD VENIPUNCTURE: CPT

## 2024-10-04 PROCEDURE — 85610 PROTHROMBIN TIME: CPT | Performed by: FAMILY MEDICINE

## 2024-10-04 PROCEDURE — G0008 ADMIN INFLUENZA VIRUS VAC: HCPCS | Performed by: FAMILY MEDICINE

## 2024-10-14 ENCOUNTER — CLINICAL SUPPORT NO REQUIREMENTS (OUTPATIENT)
Dept: CARDIOLOGY | Facility: CLINIC | Age: 79
End: 2024-10-14
Payer: MEDICARE

## 2024-10-14 ENCOUNTER — OFFICE VISIT (OUTPATIENT)
Dept: CARDIOLOGY | Facility: CLINIC | Age: 79
End: 2024-10-14
Payer: MEDICARE

## 2024-10-14 VITALS
HEIGHT: 66 IN | OXYGEN SATURATION: 97 % | WEIGHT: 287 LBS | DIASTOLIC BLOOD PRESSURE: 84 MMHG | BODY MASS INDEX: 46.12 KG/M2 | HEART RATE: 78 BPM | SYSTOLIC BLOOD PRESSURE: 129 MMHG | RESPIRATION RATE: 18 BRPM

## 2024-10-14 DIAGNOSIS — I49.5 SICK SINUS SYNDROME: Chronic | ICD-10-CM

## 2024-10-14 DIAGNOSIS — I10 ESSENTIAL HYPERTENSION: Primary | ICD-10-CM

## 2024-10-14 DIAGNOSIS — Z95.0 PRESENCE OF CARDIAC PACEMAKER: Chronic | ICD-10-CM

## 2024-10-14 DIAGNOSIS — I48.0 PAROXYSMAL ATRIAL FIBRILLATION: ICD-10-CM

## 2024-10-15 NOTE — PROGRESS NOTES
Cardiology Office Follow Up Visit      Primary Care Provider:  Albin Guillen MD    Reason for f/u:     Sick sinus syndrome  Dual-chamber pacemaker  Paroxysmal atrial fibrillation      Subjective     CC:    Denies chest pain or dyspnea    History of Present Illness       Arin Singh is a 79 y.o. female.  Patient is a very pleasant 79-year-old female who routinely follows with Dr. Berger.    She is known to have sick sinus syndrome with previous dual-chamber pacemaker implant.  She has paroxysmal atrial fibrillation and has been treated with sotalol and is anticoagulated with warfarin.    She has a history of thromboembolic disease with MTHFR gene mutation.    Most recent echocardiogram showed her ejection fraction to be 55% with no significant valvular flow abnormalities.    Patient denies any current or recent chest pain, dyspnea, PND, orthopnea, palpitations, near syncope, lower extremity edema or feelings of her heart racing.    In July 2024 the patient patient had generator replacement of her dual-chamber pacemaker.      ASSESSMENT/PLAN:        There are no diagnoses linked to this encounter.       MEDICAL DECISION MAKING:    Patient appears well compensated.  She is not having any symptoms suggestive of angina.  No recurrence of A-fib on device interrogation.  Her pacemaker function is stable.    Blood pressure is stable on current medical therapy.  EKG shows an AV paced rhythm.  I made no changes in her medication.  Will continue the current treatment.  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    Coronary artery disease     Afib pacemaker 06/2016    Hyperlipidemia 09/12/2019    Hypertension 09/12/2019    Infectious viral hepatitis A 1959    Obesity     Osteoarthritis of knee 10/07/2015    Other pulmonary embolism without acute cor  pulmonale 03/08/2013    Paroxysmal atrial fibrillation 10/04/2017    Presence of cardiac pacemaker 09/09/2019    Sick sinus syndrome 09/09/2019    Sleep apnea 2023    Cpap compliant    Vitamin B12 deficiency 11/02/2017    Vitamin D deficiency 09/25/2018       Past Surgical History:   Procedure Laterality Date    BREAST SURGERY  1963 biopsy    COLONOSCOPY      EYE SURGERY  2003 cataracts    INSERT / REPLACE / REMOVE PACEMAKER  2016    BS AV Sequential    JOINT REPLACEMENT  11/2015    Right knee    PACEMAKER REPLACEMENT N/A 7/15/2024    Procedure: PPM generator change - dual, [place] - REPS EMAILED;  Surgeon: Hakan Ríos MD;  Location: [facility] INVASIVE LOCATION;  Service: Cardiovascular;  Laterality: N/A;    REPLACEMENT TOTAL KNEE Right          Current Outpatient Medications:     lisinopril-hydrochlorothiazide (PRINZIDE,ZESTORETIC) 20-12.5 MG per tablet, TAKE 1 TABLET BY MOUTH DAILY, Disp: 90 tablet, Rfl: 3    multivitamin with minerals (EYE VITAMINS & MINERALS PO), Take 1 tablet by mouth Daily., Disp: , Rfl:     sotalol (BETAPACE AF) 80 MG tablet tablet, TAKE 1 TABLET BY MOUTH TWICE  DAILY, Disp: 120 tablet, Rfl: 5    verapamil SR (CALAN-SR) 240 MG CR tablet, TAKE 1 TABLET BY MOUTH DAILY, Disp: 90 tablet, Rfl: 3    warfarin (COUMADIN) 6 MG tablet, TAKE 1 TABLET BY MOUTH DAILY AS  DIRECTED, Disp: 90 tablet, Rfl: 3    Social History     Socioeconomic History    Marital status:    Tobacco Use    Smoking status: Never     Passive exposure: Never    Smokeless tobacco: Never   Vaping Use    Vaping status: Never Used   Substance and Sexual Activity    Alcohol use: Yes     Comment: occassionally- few times a year    Drug use: No    Sexual activity: Not Currently     Partners: Male     Birth control/protection: Partner of same sex       Family History   Problem Relation Age of Onset    Heart attack Mother        The following portions of the patient's history were reviewed and updated as appropriate:  "allergies, current medications, past family history, past medical history, past social history, past surgical history and problem list.    Review of Systems   All other systems reviewed and are negative.    Pertinent items are noted in HPI, all other systems reviewed and negative      /84 (BP Location: Right arm, Patient Position: Sitting, Cuff Size: Large Adult)   Pulse 78   Resp 18   Ht 167.6 cm (66\")   Wt 130 kg (287 lb)   SpO2 97%   BMI 46.32 kg/m² .    Objective     Vitals reviewed.   Constitutional:       General: Not in acute distress.     Appearance: Normal appearance. Well-developed.   Eyes:      Pupils: Pupils are equal, round, and reactive to light.   HENT:      Head: Normocephalic and atraumatic.   Neck:      Vascular: No JVD.   Pulmonary:      Effort: Pulmonary effort is normal.      Breath sounds: Normal breath sounds.   Cardiovascular:      Normal rate. Regular rhythm.   Pulses:     Intact distal pulses.   Edema:     Peripheral edema absent.   Abdominal:      General: There is no distension.      Palpations: Abdomen is soft.      Tenderness: There is no abdominal tenderness.   Musculoskeletal: Normal range of motion.      Cervical back: Normal range of motion and neck supple. Skin:     General: Skin is warm and dry.   Neurological:      Mental Status: Alert and oriented to person, place, and time.           EKG ordered and reviewed by me in office    ECG 12 Lead    Date/Time: 10/14/2024 4:05 PM  Performed by: Mayda Sun APRN    Authorized by: Mayda Sun APRN  Comparison: compared with previous ECG   Similar to previous ECG  Rhythm: paced  BPM: 78  Pacing: dual chamber pacing  Clinical impression: abnormal EKG              In Office Device Interrogation: Reviewed    DEVICE INTERROGATION:  IN OFFICE    DEVICE TYPE:   Dual-chamber pacemaker    :   [employer]    BATTERY:  Stable    TIME TO ELECTIVE REPLACEMENT INDICATORS:   12 years    CHARGE TIME:   Not " applicable        LEAD DATA:   LEADS Reprogrammed for testing purposes    Atrial:   1.6 mV, 605 ohms, 0.8 V@0.4 ms    Ventricular:     10.4 mV, 606 ohms, 1.0 V@0.4 ms    LV:      Pacemaker Dependent: Yes      Atrial pacing percentage: 100%    Ventricular pacing percentage: 100%      Arrhythmia Logbook Reviewed: No A-fib        Summary:    Stable Device Function    No significant arhythmia burden.     Battery status is stable.      NEXT IN OFFICE DEVICE CHECK DUE: 1 year    REMOTE DEVICE INTERROGATIONS: Ongoing

## 2024-11-13 ENCOUNTER — LAB (OUTPATIENT)
Dept: FAMILY MEDICINE CLINIC | Facility: CLINIC | Age: 79
End: 2024-11-13
Payer: MEDICARE

## 2024-11-13 DIAGNOSIS — I48.0 PAROXYSMAL ATRIAL FIBRILLATION: ICD-10-CM

## 2024-11-13 LAB
INR PPP: 3.2 (ref 2–3)
PROTHROMBIN TIME: 32.5 SECONDS (ref 19.4–28.5)

## 2024-11-13 PROCEDURE — 85610 PROTHROMBIN TIME: CPT | Performed by: FAMILY MEDICINE

## 2024-11-13 PROCEDURE — 36415 COLL VENOUS BLD VENIPUNCTURE: CPT

## 2024-12-02 ENCOUNTER — LAB (OUTPATIENT)
Dept: FAMILY MEDICINE CLINIC | Facility: CLINIC | Age: 79
End: 2024-12-02
Payer: MEDICARE

## 2024-12-02 DIAGNOSIS — I48.0 PAROXYSMAL ATRIAL FIBRILLATION: ICD-10-CM

## 2024-12-02 LAB
INR PPP: 2.99 (ref 2–3)
PROTHROMBIN TIME: 31.1 SECONDS (ref 19.4–28.5)

## 2024-12-02 PROCEDURE — 85610 PROTHROMBIN TIME: CPT | Performed by: FAMILY MEDICINE

## 2024-12-02 PROCEDURE — 36415 COLL VENOUS BLD VENIPUNCTURE: CPT

## 2024-12-03 ENCOUNTER — TELEPHONE (OUTPATIENT)
Dept: FAMILY MEDICINE CLINIC | Facility: CLINIC | Age: 79
End: 2024-12-03
Payer: MEDICARE

## 2024-12-03 NOTE — TELEPHONE ENCOUNTER
RELAY  Left detailed voicemail for Arin Singh as per verbal release. Advised Arin Singh to call the office with any additional questions.    ----- Message from Albin Guillen sent at 12/2/2024 11:29 PM EST -----  INR looks great at 2.99. No changes to regimen. Repeat INR in 4 weeks

## 2024-12-03 NOTE — PROGRESS NOTES
*changed to telephone note*  Left detailed voicemail for Arin Singh as per verbal release. Advised Arin Singh to call the office with any additional questions.

## 2025-01-13 ENCOUNTER — OFFICE VISIT (OUTPATIENT)
Dept: FAMILY MEDICINE CLINIC | Facility: CLINIC | Age: 80
End: 2025-01-13
Payer: MEDICARE

## 2025-01-13 ENCOUNTER — LAB (OUTPATIENT)
Dept: FAMILY MEDICINE CLINIC | Facility: CLINIC | Age: 80
End: 2025-01-13
Payer: MEDICARE

## 2025-01-13 VITALS
DIASTOLIC BLOOD PRESSURE: 74 MMHG | OXYGEN SATURATION: 97 % | WEIGHT: 288.4 LBS | HEART RATE: 71 BPM | RESPIRATION RATE: 16 BRPM | SYSTOLIC BLOOD PRESSURE: 118 MMHG | HEIGHT: 66 IN | BODY MASS INDEX: 46.35 KG/M2

## 2025-01-13 DIAGNOSIS — Z00.00 MEDICARE ANNUAL WELLNESS VISIT, SUBSEQUENT: Primary | ICD-10-CM

## 2025-01-13 DIAGNOSIS — I48.0 PAROXYSMAL ATRIAL FIBRILLATION: ICD-10-CM

## 2025-01-13 DIAGNOSIS — I10 ESSENTIAL HYPERTENSION: ICD-10-CM

## 2025-01-13 DIAGNOSIS — I49.5 SICK SINUS SYNDROME: Chronic | ICD-10-CM

## 2025-01-13 DIAGNOSIS — G47.33 OSA ON CPAP: ICD-10-CM

## 2025-01-13 DIAGNOSIS — M17.0 OSTEOARTHRITIS OF BOTH KNEES, UNSPECIFIED OSTEOARTHRITIS TYPE: ICD-10-CM

## 2025-01-13 DIAGNOSIS — M85.88 OSTEOPENIA OF OTHER SITE: ICD-10-CM

## 2025-01-13 DIAGNOSIS — E55.9 VITAMIN D DEFICIENCY, UNSPECIFIED: ICD-10-CM

## 2025-01-13 LAB
INR PPP: 2.41 (ref 2–3)
PROTHROMBIN TIME: 26.3 SECONDS (ref 19.4–28.5)

## 2025-01-13 PROCEDURE — 1126F AMNT PAIN NOTED NONE PRSNT: CPT | Performed by: FAMILY MEDICINE

## 2025-01-13 PROCEDURE — 36415 COLL VENOUS BLD VENIPUNCTURE: CPT

## 2025-01-13 PROCEDURE — 85610 PROTHROMBIN TIME: CPT | Performed by: FAMILY MEDICINE

## 2025-01-13 PROCEDURE — 85025 COMPLETE CBC W/AUTO DIFF WBC: CPT | Performed by: FAMILY MEDICINE

## 2025-01-13 PROCEDURE — 1160F RVW MEDS BY RX/DR IN RCRD: CPT | Performed by: FAMILY MEDICINE

## 2025-01-13 PROCEDURE — 84443 ASSAY THYROID STIM HORMONE: CPT | Performed by: FAMILY MEDICINE

## 2025-01-13 PROCEDURE — 82306 VITAMIN D 25 HYDROXY: CPT | Performed by: FAMILY MEDICINE

## 2025-01-13 PROCEDURE — 3078F DIAST BP <80 MM HG: CPT | Performed by: FAMILY MEDICINE

## 2025-01-13 PROCEDURE — 84439 ASSAY OF FREE THYROXINE: CPT | Performed by: FAMILY MEDICINE

## 2025-01-13 PROCEDURE — 80053 COMPREHEN METABOLIC PANEL: CPT | Performed by: FAMILY MEDICINE

## 2025-01-13 PROCEDURE — G0439 PPPS, SUBSEQ VISIT: HCPCS | Performed by: FAMILY MEDICINE

## 2025-01-13 PROCEDURE — 80061 LIPID PANEL: CPT | Performed by: FAMILY MEDICINE

## 2025-01-13 PROCEDURE — 83036 HEMOGLOBIN GLYCOSYLATED A1C: CPT | Performed by: FAMILY MEDICINE

## 2025-01-13 PROCEDURE — 1159F MED LIST DOCD IN RCRD: CPT | Performed by: FAMILY MEDICINE

## 2025-01-13 PROCEDURE — 82607 VITAMIN B-12: CPT | Performed by: FAMILY MEDICINE

## 2025-01-13 PROCEDURE — 3074F SYST BP LT 130 MM HG: CPT | Performed by: FAMILY MEDICINE

## 2025-01-13 RX ORDER — MULTIPLE VITAMINS W/ MINERALS TAB 9MG-400MCG
1 TAB ORAL DAILY
COMMUNITY

## 2025-01-13 RX ORDER — SOTALOL HYDROCHLORIDE 80 MG/1
80 TABLET ORAL 2 TIMES DAILY
Qty: 180 TABLET | Refills: 3 | Status: SHIPPED | OUTPATIENT
Start: 2025-01-13

## 2025-01-13 NOTE — ASSESSMENT & PLAN NOTE
- Cont home sotalol 80 BID, verapamil 240 daily  - Cont home warfarin 6mg daily  - Cont CARDS f/u- Ab

## 2025-01-13 NOTE — ASSESSMENT & PLAN NOTE
S/p R knee replacement. Not interested in additional work-up at this time  - Cont home Voltaren gel PRN

## 2025-01-13 NOTE — PROGRESS NOTES
Subjective   The ABCs of the Annual Wellness Visit  Medicare Wellness Visit    Arin Singh is a 79 y.o. patient who presents for a Medicare Wellness Visit.    The following portions of the patient's history were reviewed and   updated as appropriate: allergies, current medications, past family history, past medical history, past social history, past surgical history, and problem list.    Compared to one year ago, the patient's physical   health is the same.  Compared to one year ago, the patient's mental   health is the same.    Recent Hospitalizations:  She was not admitted to the hospital during the last year.     Current Medical Providers:  Patient Care Team:  Albin Guillen MD as PCP - General (Internal Medicine)  Hakan Ríos MD as Consulting Physician (Cardiology)    Outpatient Medications Prior to Visit   Medication Sig Dispense Refill    lisinopril-hydrochlorothiazide (PRINZIDE,ZESTORETIC) 20-12.5 MG per tablet TAKE 1 TABLET BY MOUTH DAILY 90 tablet 3    multivitamin with minerals (EYE VITAMINS & MINERALS PO) Take 1 tablet by mouth Daily.      multivitamin with minerals tablet tablet Take 1 tablet by mouth Daily.      sotalol (BETAPACE AF) 80 MG tablet tablet TAKE 1 TABLET BY MOUTH TWICE  DAILY 180 tablet 3    verapamil SR (CALAN-SR) 240 MG CR tablet TAKE 1 TABLET BY MOUTH DAILY 90 tablet 3    warfarin (COUMADIN) 6 MG tablet TAKE 1 TABLET BY MOUTH DAILY AS  DIRECTED 90 tablet 3     No facility-administered medications prior to visit.     No opioid medication identified on active medication list. I have reviewed chart for other potential  high risk medication/s and harmful drug interactions in the elderly.      Aspirin is not on active medication list.  Aspirin use is not indicated based on review of current medical condition/s. Risk of harm outweighs potential benefits.  .    Patient Active Problem List   Diagnosis    Sick sinus syndrome    Presence of cardiac pacemaker    Absolute anemia  "   Coagulation factor deficiency syndrome    Hyperlipidemia, mixed    Essential hypertension    Osteoarthritis of knee    Paroxysmal atrial fibrillation    Vitamin B12 deficiency    Vitamin D deficiency    Warfarin therapy started    Screening for breast cancer    Osteopenia    YESY on CPAP     Advance Care Planning Advance Directive is on file.  ACP discussion was held with the patient during this visit. Patient has an advance directive in EMR which is still valid.       Objective   Vitals:    01/13/25 1338   BP: 118/74   Pulse: 71   Resp: 16   SpO2: 97%   Weight: 131 kg (288 lb 6.4 oz)   Height: 167.6 cm (66\")   PainSc: 0-No pain     Estimated body mass index is 46.55 kg/m² as calculated from the following:    Height as of this encounter: 167.6 cm (66\").    Weight as of this encounter: 131 kg (288 lb 6.4 oz).  Weight down 10lbs from 1 year ago    Class 3 Severe Obesity (BMI >=40). Obesity-related health conditions include the following: obstructive sleep apnea, hypertension, dyslipidemias, and osteoarthritis. Obesity is improving with lifestyle modifications. BMI is is above average; BMI management plan is completed. We discussed portion control and increasing exercise.    Does the patient have evidence of cognitive impairment? No  Lab Results   Component Value Date    TRIG 89 01/13/2025    HDL 53 01/13/2025     (H) 01/13/2025    VLDL 16 01/13/2025    HGBA1C 5.70 (H) 01/13/2025                                                                                             Health  Risk Assessment    Smoking Status:  Social History     Tobacco Use   Smoking Status Never    Passive exposure: Never   Smokeless Tobacco Never     Alcohol Consumption:  Social History     Substance and Sexual Activity   Alcohol Use Yes    Comment: 2-3 per year     Fall Risk Screen  STEADI Fall Risk Assessment was completed, and patient is at LOW risk for falls.Assessment completed on:1/13/2025    Depression Screening   Little interest " or pleasure in doing things? Not at all   Feeling down, depressed, or hopeless? Not at all   PHQ-2 Total Score 0      Health Habits and Functional and Cognitive Screenin/11/2025     1:51 PM   Functional & Cognitive Status   Do you have difficulty preparing food and eating? No    Do you have difficulty bathing yourself, getting dressed or grooming yourself? No    Do you have difficulty using the toilet? No    Do you have difficulty moving around from place to place? No    Do you have trouble with steps or getting out of a bed or a chair? Yes    Current Diet Well Balanced Diet    Dental Exam Up to date    Eye Exam Up to date    Exercise (times per week) 3 times per week    Current Exercises Include Home Exercise Program (TV, Computer, Etc.);House Cleaning;Other;Walking    Do you need help using the phone?  No    Are you deaf or do you have serious difficulty hearing?  No    Do you need help to go to places out of walking distance? No    Do you need help shopping? No    Do you need help preparing meals?  No    Do you need help with housework?  No    Do you need help with laundry? No    Do you need help taking your medications? No    Do you need help managing money? No    Do you ever drive or ride in a car without wearing a seat belt? No    Have you felt unusual stress, anger or loneliness in the last month? No    Who do you live with? Alone    If you need help, do you have trouble finding someone available to you? No    Have you been bothered in the last four weeks by sexual problems? No    Do you have difficulty concentrating, remembering or making decisions? No        Patient-reported           Age-appropriate Screening Schedule:  Refer to the list below for future screening recommendations based on patient's age, sex and/or medical conditions. Orders for these recommended tests are listed in the plan section. The patient has been provided with a written plan.    Health Maintenance List  Health Maintenance    Topic Date Due    TDAP/TD VACCINES (1 - Tdap) Never done    ZOSTER VACCINE (2 of 3) 01/30/2015    RSV Vaccine - Adults (1 - 1-dose 75+ series) Never done    DXA SCAN  03/02/2024    COVID-19 Vaccine (4 - 2024-25 season) 12/31/2025 (Originally 9/1/2024)    ANNUAL WELLNESS VISIT  01/13/2026    LIPID PANEL  01/13/2026    BMI FOLLOWUP  01/13/2026    INFLUENZA VACCINE  Completed    Pneumococcal Vaccine 65+  Completed    HEMOGLOBIN A1C  Discontinued                                                                                                                                              CMS Preventative Services Quick Reference  Risk Factors Identified During Encounter  Immunizations Discussed/Encouraged: Tdap    The above risks/problems have been discussed with the patient.  Pertinent information has been shared with the patient in the After Visit Summary.  An After Visit Summary and PPPS were made available to the patient.    Preventative  Colonoscopy: 2014, potentially due 2024. Declined  Mammogram: 3/2022, declined further  Pap smear: aged out  DEXA: 3/2022- osteopenia. Taking Ca/VitD daily. Ordered today   Shingles: Zostavax 12/2014  Pneumonia: Prevnar 10/2015, Pneumovax 12/2020  Tdap: Recommended  Influenza: 10/2024  COVID: Completed 3 shots Pfizer (10/2021); counseled regarding booster    Follow Up:   Next Medicare Wellness visit to be scheduled in 1 year.    Additional E&M Note during same encounter follows:  Patient has additional, significant, and separately identifiable condition(s)/problem(s) that require work above and beyond the Medicare Wellness Visit     Chief Complaint  Medicare Wellness-subsequent    Subjective   HPI  Arin is also being seen today for additional medical problem/s.      PAF: denies palpitations. Asymptomatic w/ afib. S/p pacemaker. Also maintained on sotalol 80 BID, verapamil 240 daily. Warfarin regimen is 6mg daily. Follows w/ DC Berger.      SSS: s/p pacemaker in 6/2016 w/ battery  "change 7/2024. Does report some fatigue but still very active. Follows w/ CARDS- Ab/Corinne     HTN: 118/74 today. Maintained on Prinzide 20/12.5 daily and verapamil 240 daily. No LH/dizziness, CP. Reports SOB w/ activity/exertion     OA: s/p R knee replacement and L knee bothers her most. Not taking any medication as Voltaren gel did not seem to help. Never had steroid shot. Not interested in this or further surgery. No significant pain, just stiffness.      YESY: now maintained on CPAP. Doesn't like the idea of CPAP but does admit to sleeping better w/ CPAP. Overall, happy w/ current control and sleeps well    Objective   Vital Signs:  /74   Pulse 71   Resp 16   Ht 167.6 cm (66\")   Wt 131 kg (288 lb 6.4 oz)   SpO2 97%   BMI 46.55 kg/m²   Physical Exam  Constitutional:       General: She is not in acute distress.     Appearance: She is well-developed. She is obese.   HENT:      Head: Normocephalic and atraumatic.      Right Ear: Tympanic membrane and external ear normal. There is no impacted cerumen.      Left Ear: Tympanic membrane and external ear normal. There is no impacted cerumen.      Nose: Nose normal.      Mouth/Throat:      Mouth: Mucous membranes are moist.      Pharynx: No oropharyngeal exudate or posterior oropharyngeal erythema.   Eyes:      General: No scleral icterus.        Right eye: No discharge.         Left eye: No discharge.      Extraocular Movements: Extraocular movements intact.      Conjunctiva/sclera: Conjunctivae normal.      Pupils: Pupils are equal, round, and reactive to light.   Neck:      Thyroid: No thyromegaly.      Vascular: No carotid bruit.   Cardiovascular:      Rate and Rhythm: Normal rate and regular rhythm.      Heart sounds: Normal heart sounds.   Pulmonary:      Effort: Pulmonary effort is normal. No respiratory distress.      Breath sounds: Normal breath sounds. No wheezing or rales.   Abdominal:      General: Bowel sounds are normal. There is no " distension.      Palpations: Abdomen is soft.      Tenderness: There is no abdominal tenderness.   Musculoskeletal:         General: No deformity. Normal range of motion.      Cervical back: Normal range of motion and neck supple.   Lymphadenopathy:      Cervical: No cervical adenopathy.   Skin:     General: Skin is warm and dry.      Findings: No rash.   Neurological:      General: No focal deficit present.      Mental Status: She is alert and oriented to person, place, and time. Mental status is at baseline.      Cranial Nerves: No cranial nerve deficit.      Sensory: No sensory deficit.   Psychiatric:         Behavior: Behavior normal.         Thought Content: Thought content normal.             Assessment and Plan Additional age appropriate preventative wellness advice topics were discussed during today's preventative wellness exam(some topics already addressed during AWV portion of the note above):    Physical Activity: Advised cardiovascular activity 150 minutes per week as tolerated. (example brisk walk for 30 minutes, 5 days a week).     Medicare annual wellness visit, subsequent  -  Counseled regarding diet, exercise, weight loss, and preventative health maintenance items/immunizations below  Orders:    CBC & Differential    Comprehensive Metabolic Panel    Hemoglobin A1c    Lipid Panel    TSH    T4, Free    Vitamin D,25-Hydroxy    Vitamin B12    DEXA Bone Density Axial; Future    Paroxysmal atrial fibrillation  - Cont home sotalol 80 BID, verapamil 240 daily  - Cont home warfarin 6mg daily  - Cont CARDS f/u- Ab  Sick sinus syndrome  S/p pacemaker placement  - Cont CARDS f/u- Ab   Essential hypertension  118/74 today  - Cont home Prinzide 20/12.5 daily  Orders:    Comprehensive Metabolic Panel    Osteoarthritis of both knees, unspecified osteoarthritis type  S/p R knee replacement. Not interested in additional work-up at this time  - Cont home Voltaren gel PRN  YESY on CPAP  - Cont home CPAP  nightly  Vitamin D deficiency, unspecified  Orders:    Vitamin D,25-Hydroxy    Osteopenia of other site  Orders:    DEXA Bone Density Axial; Future            Follow Up   Return in about 1 year (around 1/13/2026) for Medicare Wellness.  Patient was given instructions and counseling regarding her condition or for health maintenance advice. Please see specific information pulled into the AVS if appropriate.

## 2025-01-14 DIAGNOSIS — N18.32 STAGE 3B CHRONIC KIDNEY DISEASE: Primary | ICD-10-CM

## 2025-01-14 LAB
25(OH)D3 SERPL-MCNC: 41.5 NG/ML (ref 30–100)
ALBUMIN SERPL-MCNC: 4 G/DL (ref 3.5–5.2)
ALBUMIN/GLOB SERPL: 1 G/DL
ALP SERPL-CCNC: 94 U/L (ref 39–117)
ALT SERPL W P-5'-P-CCNC: 12 U/L (ref 1–33)
ANION GAP SERPL CALCULATED.3IONS-SCNC: 13.6 MMOL/L (ref 5–15)
AST SERPL-CCNC: 26 U/L (ref 1–32)
BASOPHILS # BLD AUTO: 0.07 10*3/MM3 (ref 0–0.2)
BASOPHILS NFR BLD AUTO: 1 % (ref 0–1.5)
BILIRUB SERPL-MCNC: 0.7 MG/DL (ref 0–1.2)
BUN SERPL-MCNC: 20 MG/DL (ref 8–23)
BUN/CREAT SERPL: 14.5 (ref 7–25)
CALCIUM SPEC-SCNC: 9.9 MG/DL (ref 8.6–10.5)
CHLORIDE SERPL-SCNC: 102 MMOL/L (ref 98–107)
CHOLEST SERPL-MCNC: 213 MG/DL (ref 0–200)
CO2 SERPL-SCNC: 24.4 MMOL/L (ref 22–29)
CREAT SERPL-MCNC: 1.38 MG/DL (ref 0.57–1)
DEPRECATED RDW RBC AUTO: 41.5 FL (ref 37–54)
EGFRCR SERPLBLD CKD-EPI 2021: 38.5 ML/MIN/1.73
EOSINOPHIL # BLD AUTO: 0.13 10*3/MM3 (ref 0–0.4)
EOSINOPHIL NFR BLD AUTO: 1.8 % (ref 0.3–6.2)
ERYTHROCYTE [DISTWIDTH] IN BLOOD BY AUTOMATED COUNT: 12.1 % (ref 12.3–15.4)
GLOBULIN UR ELPH-MCNC: 4 GM/DL
GLUCOSE SERPL-MCNC: 107 MG/DL (ref 65–99)
HBA1C MFR BLD: 5.7 % (ref 4.8–5.6)
HCT VFR BLD AUTO: 47.6 % (ref 34–46.6)
HDLC SERPL-MCNC: 53 MG/DL (ref 40–60)
HGB BLD-MCNC: 15.7 G/DL (ref 12–15.9)
IMM GRANULOCYTES # BLD AUTO: 0.02 10*3/MM3 (ref 0–0.05)
IMM GRANULOCYTES NFR BLD AUTO: 0.3 % (ref 0–0.5)
LDLC SERPL CALC-MCNC: 144 MG/DL (ref 0–100)
LDLC/HDLC SERPL: 2.68 {RATIO}
LYMPHOCYTES # BLD AUTO: 2.14 10*3/MM3 (ref 0.7–3.1)
LYMPHOCYTES NFR BLD AUTO: 29.8 % (ref 19.6–45.3)
MCH RBC QN AUTO: 31.2 PG (ref 26.6–33)
MCHC RBC AUTO-ENTMCNC: 33 G/DL (ref 31.5–35.7)
MCV RBC AUTO: 94.4 FL (ref 79–97)
MONOCYTES # BLD AUTO: 0.63 10*3/MM3 (ref 0.1–0.9)
MONOCYTES NFR BLD AUTO: 8.8 % (ref 5–12)
NEUTROPHILS NFR BLD AUTO: 4.19 10*3/MM3 (ref 1.7–7)
NEUTROPHILS NFR BLD AUTO: 58.3 % (ref 42.7–76)
NRBC BLD AUTO-RTO: 0 /100 WBC (ref 0–0.2)
PLATELET # BLD AUTO: 224 10*3/MM3 (ref 140–450)
PMV BLD AUTO: 11.8 FL (ref 6–12)
POTASSIUM SERPL-SCNC: 4.5 MMOL/L (ref 3.5–5.2)
PROT SERPL-MCNC: 8 G/DL (ref 6–8.5)
RBC # BLD AUTO: 5.04 10*6/MM3 (ref 3.77–5.28)
SODIUM SERPL-SCNC: 140 MMOL/L (ref 136–145)
T4 FREE SERPL-MCNC: 1.49 NG/DL (ref 0.92–1.68)
TRIGL SERPL-MCNC: 89 MG/DL (ref 0–150)
TSH SERPL DL<=0.05 MIU/L-ACNC: 1.07 UIU/ML (ref 0.27–4.2)
VIT B12 BLD-MCNC: 900 PG/ML (ref 211–946)
VLDLC SERPL-MCNC: 16 MG/DL (ref 5–40)
WBC NRBC COR # BLD AUTO: 7.18 10*3/MM3 (ref 3.4–10.8)

## 2025-01-24 RX ORDER — WARFARIN SODIUM 6 MG/1
6 TABLET ORAL DAILY
Qty: 90 TABLET | Refills: 2 | Status: SHIPPED | OUTPATIENT
Start: 2025-01-24

## 2025-02-13 ENCOUNTER — LAB (OUTPATIENT)
Dept: FAMILY MEDICINE CLINIC | Facility: CLINIC | Age: 80
End: 2025-02-13
Payer: MEDICARE

## 2025-02-13 DIAGNOSIS — N18.32 STAGE 3B CHRONIC KIDNEY DISEASE: ICD-10-CM

## 2025-02-13 DIAGNOSIS — I48.0 PAROXYSMAL ATRIAL FIBRILLATION: ICD-10-CM

## 2025-02-13 LAB
ANION GAP SERPL CALCULATED.3IONS-SCNC: 9.4 MMOL/L (ref 5–15)
BUN SERPL-MCNC: 18 MG/DL (ref 8–23)
BUN/CREAT SERPL: 16.4 (ref 7–25)
CALCIUM SPEC-SCNC: 9.8 MG/DL (ref 8.6–10.5)
CHLORIDE SERPL-SCNC: 104 MMOL/L (ref 98–107)
CO2 SERPL-SCNC: 25.6 MMOL/L (ref 22–29)
CREAT SERPL-MCNC: 1.1 MG/DL (ref 0.57–1)
DEPRECATED RDW RBC AUTO: 40.1 FL (ref 37–54)
EGFRCR SERPLBLD CKD-EPI 2021: 50.6 ML/MIN/1.73
ERYTHROCYTE [DISTWIDTH] IN BLOOD BY AUTOMATED COUNT: 11.9 % (ref 12.3–15.4)
GLUCOSE SERPL-MCNC: 99 MG/DL (ref 65–99)
HCT VFR BLD AUTO: 43.4 % (ref 34–46.6)
HGB BLD-MCNC: 15.1 G/DL (ref 12–15.9)
INR PPP: 3.19 (ref 2–3)
MCH RBC QN AUTO: 32.1 PG (ref 26.6–33)
MCHC RBC AUTO-ENTMCNC: 34.8 G/DL (ref 31.5–35.7)
MCV RBC AUTO: 92.3 FL (ref 79–97)
PLATELET # BLD AUTO: 196 10*3/MM3 (ref 140–450)
PMV BLD AUTO: 12.5 FL (ref 6–12)
POTASSIUM SERPL-SCNC: 4.2 MMOL/L (ref 3.5–5.2)
PROTHROMBIN TIME: 33.1 SECONDS (ref 19.4–28.5)
RBC # BLD AUTO: 4.7 10*6/MM3 (ref 3.77–5.28)
SODIUM SERPL-SCNC: 139 MMOL/L (ref 136–145)
WBC NRBC COR # BLD AUTO: 5.79 10*3/MM3 (ref 3.4–10.8)

## 2025-02-13 PROCEDURE — 85610 PROTHROMBIN TIME: CPT | Performed by: FAMILY MEDICINE

## 2025-02-13 PROCEDURE — 80048 BASIC METABOLIC PNL TOTAL CA: CPT | Performed by: FAMILY MEDICINE

## 2025-02-13 PROCEDURE — 36415 COLL VENOUS BLD VENIPUNCTURE: CPT

## 2025-02-13 PROCEDURE — 85027 COMPLETE CBC AUTOMATED: CPT | Performed by: FAMILY MEDICINE

## 2025-03-17 RX ORDER — VERAPAMIL HYDROCHLORIDE 240 MG/1
240 TABLET, FILM COATED, EXTENDED RELEASE ORAL DAILY
Qty: 90 TABLET | Refills: 3 | Status: SHIPPED | OUTPATIENT
Start: 2025-03-17

## 2025-03-17 RX ORDER — LISINOPRIL AND HYDROCHLOROTHIAZIDE 12.5; 2 MG/1; MG/1
1 TABLET ORAL DAILY
Qty: 90 TABLET | Refills: 3 | Status: SHIPPED | OUTPATIENT
Start: 2025-03-17

## 2025-03-19 ENCOUNTER — LAB (OUTPATIENT)
Dept: FAMILY MEDICINE CLINIC | Facility: CLINIC | Age: 80
End: 2025-03-19
Payer: MEDICARE

## 2025-03-19 DIAGNOSIS — I48.0 PAROXYSMAL ATRIAL FIBRILLATION: ICD-10-CM

## 2025-03-19 LAB
INR PPP: 2.51 (ref 2–3)
PROTHROMBIN TIME: 27.3 SECONDS (ref 19.4–28.5)

## 2025-03-19 PROCEDURE — 85610 PROTHROMBIN TIME: CPT | Performed by: FAMILY MEDICINE

## 2025-03-19 PROCEDURE — 36415 COLL VENOUS BLD VENIPUNCTURE: CPT

## 2025-04-24 ENCOUNTER — LAB (OUTPATIENT)
Dept: FAMILY MEDICINE CLINIC | Facility: CLINIC | Age: 80
End: 2025-04-24
Payer: MEDICARE

## 2025-04-24 DIAGNOSIS — I48.0 PAROXYSMAL ATRIAL FIBRILLATION: ICD-10-CM

## 2025-04-24 LAB
INR PPP: 3.17 (ref 2–3)
PROTHROMBIN TIME: 32.9 SECONDS (ref 19.4–28.5)

## 2025-04-24 PROCEDURE — 85610 PROTHROMBIN TIME: CPT | Performed by: FAMILY MEDICINE

## 2025-04-24 PROCEDURE — 36415 COLL VENOUS BLD VENIPUNCTURE: CPT

## 2025-05-28 ENCOUNTER — LAB (OUTPATIENT)
Dept: FAMILY MEDICINE CLINIC | Facility: CLINIC | Age: 80
End: 2025-05-28
Payer: MEDICARE

## 2025-05-28 DIAGNOSIS — I48.0 PAROXYSMAL ATRIAL FIBRILLATION: ICD-10-CM

## 2025-05-28 LAB
INR PPP: 3.31 (ref 2–3)
PROTHROMBIN TIME: 34.2 SECONDS (ref 19.4–28.5)

## 2025-05-28 PROCEDURE — 36415 COLL VENOUS BLD VENIPUNCTURE: CPT

## 2025-05-28 PROCEDURE — 85610 PROTHROMBIN TIME: CPT | Performed by: FAMILY MEDICINE

## 2025-06-27 ENCOUNTER — LAB (OUTPATIENT)
Dept: FAMILY MEDICINE CLINIC | Facility: CLINIC | Age: 80
End: 2025-06-27
Payer: MEDICARE

## 2025-06-27 DIAGNOSIS — I48.0 PAROXYSMAL ATRIAL FIBRILLATION: ICD-10-CM

## 2025-06-27 LAB
INR PPP: 2.45 (ref 2–3)
PROTHROMBIN TIME: 26.8 SECONDS (ref 19.4–28.5)

## 2025-06-27 PROCEDURE — 36415 COLL VENOUS BLD VENIPUNCTURE: CPT

## 2025-06-27 PROCEDURE — 85610 PROTHROMBIN TIME: CPT | Performed by: FAMILY MEDICINE

## 2025-07-28 ENCOUNTER — LAB (OUTPATIENT)
Dept: FAMILY MEDICINE CLINIC | Facility: CLINIC | Age: 80
End: 2025-07-28
Payer: MEDICARE

## 2025-07-28 ENCOUNTER — RESULTS FOLLOW-UP (OUTPATIENT)
Dept: FAMILY MEDICINE CLINIC | Facility: CLINIC | Age: 80
End: 2025-07-28
Payer: MEDICARE

## 2025-07-28 DIAGNOSIS — I48.0 PAROXYSMAL ATRIAL FIBRILLATION: ICD-10-CM

## 2025-07-28 LAB
INR PPP: 2.57 (ref 2–3)
PROTHROMBIN TIME: 27.9 SECONDS (ref 19.4–28.5)

## 2025-07-28 PROCEDURE — 36415 COLL VENOUS BLD VENIPUNCTURE: CPT

## 2025-07-28 PROCEDURE — 85610 PROTHROMBIN TIME: CPT | Performed by: FAMILY MEDICINE

## 2025-08-25 LAB
MC_CV_MDC_IDC_RATE_1: 160
MC_CV_MDC_IDC_ZONE_ID: 1
MDC_IDC_MSMT_BATTERY_REMAINING_LONGEVITY: 138 MO
MDC_IDC_MSMT_BATTERY_REMAINING_PERCENTAGE: 100 %
MDC_IDC_MSMT_BATTERY_STATUS: NORMAL
MDC_IDC_MSMT_LEADCHNL_RA_DTM: NORMAL
MDC_IDC_MSMT_LEADCHNL_RA_IMPEDANCE_VALUE: 568
MDC_IDC_MSMT_LEADCHNL_RA_PACING_THRESHOLD_AMPLITUDE: 0.8
MDC_IDC_MSMT_LEADCHNL_RA_PACING_THRESHOLD_POLARITY: NORMAL
MDC_IDC_MSMT_LEADCHNL_RA_PACING_THRESHOLD_PULSEWIDTH: 0.4
MDC_IDC_MSMT_LEADCHNL_RV_DTM: NORMAL
MDC_IDC_MSMT_LEADCHNL_RV_IMPEDANCE_VALUE: 586
MDC_IDC_MSMT_LEADCHNL_RV_PACING_THRESHOLD_AMPLITUDE: 1
MDC_IDC_MSMT_LEADCHNL_RV_PACING_THRESHOLD_POLARITY: NORMAL
MDC_IDC_MSMT_LEADCHNL_RV_PACING_THRESHOLD_PULSEWIDTH: 0.4
MDC_IDC_PG_IMPLANT_DTM: NORMAL
MDC_IDC_PG_MFG: NORMAL
MDC_IDC_PG_MODEL: NORMAL
MDC_IDC_PG_SERIAL: NORMAL
MDC_IDC_PG_TYPE: NORMAL
MDC_IDC_SESS_DTM: NORMAL
MDC_IDC_SESS_TYPE: NORMAL
MDC_IDC_SET_BRADY_AT_MODE_SWITCH_RATE: 170
MDC_IDC_SET_BRADY_LOWRATE: 70
MDC_IDC_SET_BRADY_MAX_SENSOR_RATE: 120
MDC_IDC_SET_BRADY_MAX_TRACKING_RATE: 120
MDC_IDC_SET_BRADY_MODE: NORMAL
MDC_IDC_SET_BRADY_PAV_DELAY: 150
MDC_IDC_SET_BRADY_SAV_DELAY: 150
MDC_IDC_SET_LEADCHNL_RA_PACING_AMPLITUDE: 2.5
MDC_IDC_SET_LEADCHNL_RA_PACING_POLARITY: NORMAL
MDC_IDC_SET_LEADCHNL_RA_PACING_PULSEWIDTH: 0.4
MDC_IDC_SET_LEADCHNL_RA_SENSING_POLARITY: NORMAL
MDC_IDC_SET_LEADCHNL_RA_SENSING_SENSITIVITY: 0.25
MDC_IDC_SET_LEADCHNL_RV_PACING_AMPLITUDE: 2.5
MDC_IDC_SET_LEADCHNL_RV_PACING_POLARITY: NORMAL
MDC_IDC_SET_LEADCHNL_RV_PACING_PULSEWIDTH: 0.4
MDC_IDC_SET_LEADCHNL_RV_SENSING_POLARITY: NORMAL
MDC_IDC_SET_LEADCHNL_RV_SENSING_SENSITIVITY: 0.6
MDC_IDC_SET_ZONE_STATUS: NORMAL
MDC_IDC_SET_ZONE_TYPE: NORMAL
MDC_IDC_STAT_AT_BURDEN_PERCENT: 1
MDC_IDC_STAT_BRADY_RA_PERCENT_PACED: 98
MDC_IDC_STAT_BRADY_RV_PERCENT_PACED: 100

## 2025-08-29 PROCEDURE — 85610 PROTHROMBIN TIME: CPT | Performed by: FAMILY MEDICINE

## (undated) DEVICE — DRAPE,FEM ANGIO,2 WIN,STERILE: Brand: MEDLINE

## (undated) DEVICE — SHT AIR TRANSFR COMFRT GLIDE LAT 40X80IN

## (undated) DEVICE — 3M™ PATIENT PLATE, CORDED, SPLIT, LARGE, 40 PER CASE, 1179: Brand: 3M™

## (undated) DEVICE — DRAPE,INSTRUMENT,MAGNETIC,10X16: Brand: MEDLINE

## (undated) DEVICE — 3M™ STERI-STRIP™ REINFORCED ADHESIVE SKIN CLOSURES, R1547, 1/2 IN X 4 IN (12 MM X 100 MM), 6 STRIPS/ENVELOPE: Brand: 3M™ STERI-STRIP™

## (undated) DEVICE — 3M™ IOBAN™ 2 ANTIMICROBIAL INCISE DRAPE 6650EZ: Brand: IOBAN™ 2

## (undated) DEVICE — ADHS LIQ MASTISOL 2/3ML

## (undated) DEVICE — PACEMAKER CDS: Brand: MEDLINE INDUSTRIES, INC.

## (undated) DEVICE — PLASMABLADE PS200-040 4.0: Brand: PLASMABLADE™

## (undated) DEVICE — ELECTRD DEFIB M/FUNC PROPADZ RADIOL 2PK

## (undated) DEVICE — CABL BIPOL W/ALLGTR CLIP/SM 12FT

## (undated) DEVICE — UNDYED BRAIDED (POLYGLACTIN 910), SYNTHETIC ABSORBABLE SUTURE: Brand: COATED VICRYL

## (undated) DEVICE — ANTIBACTERIAL UNDYED BRAIDED (POLYGLACTIN 910), SYNTHETIC ABSORBABLE SUTURE: Brand: COATED VICRYL